# Patient Record
Sex: MALE | Race: WHITE | NOT HISPANIC OR LATINO | Employment: FULL TIME | URBAN - METROPOLITAN AREA
[De-identification: names, ages, dates, MRNs, and addresses within clinical notes are randomized per-mention and may not be internally consistent; named-entity substitution may affect disease eponyms.]

---

## 2017-09-01 ENCOUNTER — OFFICE VISIT (OUTPATIENT)
Dept: PODIATRY | Facility: CLINIC | Age: 52
End: 2017-09-01
Payer: COMMERCIAL

## 2017-09-01 VITALS — WEIGHT: 311.5 LBS | HEIGHT: 69 IN | BODY MASS INDEX: 46.14 KG/M2

## 2017-09-01 DIAGNOSIS — L03.031 PARONYCHIA OF TOE OF RIGHT FOOT: ICD-10-CM

## 2017-09-01 DIAGNOSIS — M79.674 TOE PAIN, RIGHT: Primary | ICD-10-CM

## 2017-09-01 DIAGNOSIS — L60.0 INGROWN NAIL: ICD-10-CM

## 2017-09-01 PROCEDURE — 99204 OFFICE O/P NEW MOD 45 MIN: CPT | Mod: 25,S$GLB,, | Performed by: PODIATRIST

## 2017-09-01 PROCEDURE — 87186 SC STD MICRODIL/AGAR DIL: CPT

## 2017-09-01 PROCEDURE — 99999 PR PBB SHADOW E&M-NEW PATIENT-LVL III: CPT | Mod: PBBFAC,,, | Performed by: PODIATRIST

## 2017-09-01 PROCEDURE — 3008F BODY MASS INDEX DOCD: CPT | Mod: S$GLB,,, | Performed by: PODIATRIST

## 2017-09-01 PROCEDURE — 87077 CULTURE AEROBIC IDENTIFY: CPT

## 2017-09-01 PROCEDURE — 11730 AVULSION NAIL PLATE SIMPLE 1: CPT | Mod: T5,S$GLB,, | Performed by: PODIATRIST

## 2017-09-01 PROCEDURE — 87070 CULTURE OTHR SPECIMN AEROBIC: CPT

## 2017-09-01 PROCEDURE — 87075 CULTR BACTERIA EXCEPT BLOOD: CPT

## 2017-09-01 RX ORDER — CLINDAMYCIN HYDROCHLORIDE 300 MG/1
300 CAPSULE ORAL EVERY 8 HOURS
Qty: 21 CAPSULE | Refills: 0 | Status: SHIPPED | OUTPATIENT
Start: 2017-09-01 | End: 2018-12-03

## 2017-09-01 NOTE — PATIENT INSTRUCTIONS
"POST NAIL PROCEDURE INSTRUCTIONS    1. Leave bandage intact for 12-24 hours. If the bandage sticks as you try to remove it, soak it in warm water until it lifts off.    2. Wash the toe with antibacterial soap and water separate from the body.    3. Dry foot completely after soaking, and apply a small amount of triple antibiotic ointment (neosporin works fine!) and a fabric or cloth bandaid ("plastic" bandaids tend to lift off with ointment use).  Wear open-toed shoes as needed for comfort.     4. Take Advil or Tylenol as needed for pain.     5. Your toe may drain for the next few days. Normal drainage is yellow-to-pink, and clear, much like the fluid in a blister. Watch for redness spreading up your toe into your foot, white thick drainage (pus), pain unrelieved by medication, or nausea/vomiting/fever/chills. These are signs of infection. Please call the clinic or visit your doctor.      "

## 2017-09-01 NOTE — PROGRESS NOTES
Subjective:      Patient ID: Carroll Roe is a 52 y.o. male.    Chief Complaint: Ingrown Toenail (Right big toe)  Patient presents to clinic with the chief complaint of Rt. Great toe pain for the past several weeks.  Relates having an ingrown toenail that he has been unable to treat on his own.  States that the nail is tender with wearing closed toe shoes.  Symptoms are alleviated with shoe removal.  Describes pain as throbbing and rates as an 8/10.  Relates having mild drainage from the toe.  Denies having N/V/F/C/D.  Denies any additional pedal complaints.       Past Medical History:   Diagnosis Date    Chicken pox     Hepatomegaly     Snoring        Past Surgical History:   Procedure Laterality Date    SHOULDER SURGERY      right    WRIST SURGERY      left x 3       Family History   Problem Relation Age of Onset    Cataracts Mother     Hyperlipidemia Mother     No Known Problems Father        Social History     Social History    Marital status:      Spouse name: N/A    Number of children: N/A    Years of education: N/A     Social History Main Topics    Smoking status: Former Smoker     Types: Cigarettes    Smokeless tobacco: Former User    Alcohol use Yes      Comment: rare occasions    Drug use: Unknown    Sexual activity: Not Asked     Other Topics Concern    None     Social History Narrative    None       Current Outpatient Prescriptions   Medication Sig Dispense Refill    allopurinol (ZYLOPRIM) 300 MG tablet Take 1 tablet (300 mg total) by mouth once daily. 90 tablet 2    atorvastatin (LIPITOR) 40 MG tablet ATORVASTATIN CALCIUM 40 MG TABS      carvedilol (COREG) 12.5 MG tablet Take 12.5 mg by mouth 2 (two) times daily.       hydrochlorothiazide (HYDRODIURIL) 25 MG tablet TAKE 1 TABLET EVERY MORNING 90 tablet 2    hydrochlorothiazide (HYDRODIURIL) 25 MG tablet HYDROCHLOROTHIAZIDE 25 MG TABS      ramipril (ALTACE) 5 MG capsule RAMIPRIL 5 MG CAPS      clindamycin (CLEOCIN) 300  MG capsule Take 1 capsule (300 mg total) by mouth every 8 (eight) hours. 21 capsule 0     No current facility-administered medications for this visit.        Review of patient's allergies indicates:   Allergen Reactions    Hydromorphone      Other reaction(s): nausea    Rosuvastatin      Other reaction(s): insomnia, insomniaPT TOOK LIPITOR IN PAST AND STATES CAN TAKE.         Review of Systems   Constitution: Negative for chills and fever.   Cardiovascular: Negative for claudication and leg swelling.   Skin: Positive for color change and nail changes.   Musculoskeletal: Negative for joint pain and joint swelling.   Neurological: Negative for numbness and paresthesias.   Psychiatric/Behavioral: Negative for altered mental status.           Objective:      Physical Exam   Constitutional: He is oriented to person, place, and time. He appears well-developed and well-nourished. No distress.   Cardiovascular:   Pulses:       Dorsalis pedis pulses are 2+ on the right side, and 2+ on the left side.        Posterior tibial pulses are 2+ on the right side, and 2+ on the left side.   CFT <3 seconds bilateral.  Pedal hair growth present bilateral.   No varicosities noted bilateral.  No bilateral lower extremity edema.   Musculoskeletal: He exhibits edema and tenderness.   Muscle strength 5/5 in all muscle groups bilateral.  No tenderness nor crepitation with ROM of foot/ankle joints bilateral.  Pain with palpation of the medial nail fold of the Rt. Hallux.  Bilateral rectus foot type.     Neurological: He is alert and oriented to person, place, and time. He has normal strength. No sensory deficit.   Light touch intact bilateral.     Skin: Skin is warm and dry. Capillary refill takes less than 2 seconds. No abrasion, no bruising, no burn, no ecchymosis, no laceration, no lesion, no petechiae and no rash noted. He is not diaphoretic. There is erythema. No pallor.   Pedal skin has normal turgor, temperature, and texture  bilateral.  Incurvation noted to the medial border of the Rt. Hallux nail plate.  Localized edema and erythema noted to the adjacent nail fold.  Remaining toenails x 9 appear normotrophic. Examination of the skin reveals no evidence of significant maceration, rashes, open lesions, suspicious appearing nevi or other concerning lesions.              Assessment:       Encounter Diagnoses   Name Primary?    Toe pain, right Yes    Ingrown nail     Paronychia of toe of right foot          Plan:       Carroll was seen today for ingrown toenail.    Diagnoses and all orders for this visit:    Toe pain, right    Ingrown nail  -     Aerobic culture (Specify Source)  -     CULTURE, ANAEROBE  -     clindamycin (CLEOCIN) 300 MG capsule; Take 1 capsule (300 mg total) by mouth every 8 (eight) hours.    Paronychia of toe of right foot  -     Aerobic culture (Specify Source)  -     CULTURE, ANAEROBE  -     clindamycin (CLEOCIN) 300 MG capsule; Take 1 capsule (300 mg total) by mouth every 8 (eight) hours.      I counseled the patient on his conditions, their implications and medical management.      Discussed, in depth, the risks, benefits, procedure, and follow up care associated with a partial avulsion of the medial border of the Rt. Great toenail. All questions were thoroughly answered. Consent was read, signed, and witnessed. See attached procedure note.      Wound cultures obtained.    Prescription written for clindamycin.    Given verbal and written wound care/dressing change instructions.      Rest, ice, and elevate the surgical extremity.       Fitted and dispensed a postoperative shoe to facilitate healing and to offload the surgical site.      Instructed to take tylenol or ibuprofen for postoperative pain.    RTC in 1 week for follow up.    Yaw Remy DPM

## 2017-09-02 NOTE — PROCEDURES
Nail Removal  Date/Time: 9/1/2017 6:09 PM  Performed by: SADIQ ZUNIGA  Authorized by: SADIQ ZUNIGA       Location:  Right foot  Location detail:  Right big toe  Anesthesia:  Digital block  Local anesthetic: lidocaine 1% without epinephrine  Anesthetic total (ml):  6  Preparation:  Skin prepped with alcohol and skin prepped with Betadine    Amount removed:  Partial  Side:  Ulnar  Wedge excision of skin of nail fold: No    Nail bed sutured?: No    Nail matrix removed:  None  Removed nail replaced and anchored: No    Dressing applied:  4x4, antibiotic ointment and dressing applied  Patient tolerance:  Patient tolerated the procedure well with no immediate complications

## 2017-09-05 LAB — BACTERIA SPEC AEROBE CULT: NORMAL

## 2017-09-07 ENCOUNTER — OFFICE VISIT (OUTPATIENT)
Dept: PODIATRY | Facility: CLINIC | Age: 52
End: 2017-09-07
Payer: COMMERCIAL

## 2017-09-07 VITALS — HEIGHT: 69 IN | WEIGHT: 311.5 LBS | BODY MASS INDEX: 46.14 KG/M2

## 2017-09-07 DIAGNOSIS — Z98.890 STATUS POST NAIL SURGERY: Primary | ICD-10-CM

## 2017-09-07 LAB — BACTERIA SPEC ANAEROBE CULT: NORMAL

## 2017-09-07 PROCEDURE — 99999 PR PBB SHADOW E&M-EST. PATIENT-LVL II: CPT | Mod: PBBFAC,,, | Performed by: PODIATRIST

## 2017-09-07 PROCEDURE — 99024 POSTOP FOLLOW-UP VISIT: CPT | Mod: S$GLB,,, | Performed by: PODIATRIST

## 2017-09-08 NOTE — PROGRESS NOTES
Subjective:      Patient ID: Carroll Roe is a 52 y.o. male.    Chief Complaint: Toe Pain (rt great toe )  Patient presents to clinic 1 week S/P partial avulsion of the medial border of the Rt. Hallux toenail.  Denies pain to the affected digit with today's exam.  Relates keeping the site covered with neosporin and a bandage daily.  Is able to ambulate pain free in casual shoe gear.  Notes having an adverse reaction with taking Clindamycin.  States that he began having spells of dizziness and malaise over the weekend.  States that symptoms were quite pronounced on Monday prompting him to discontinue taking the antibiotic.  States that symptoms resolved thereafter.  Denies having N/V/F/C/D.  Denies any additional pedal complaints.       Past Medical History:   Diagnosis Date    Chicken pox     Hepatomegaly     Snoring        Past Surgical History:   Procedure Laterality Date    SHOULDER SURGERY      right    WRIST SURGERY      left x 3       Family History   Problem Relation Age of Onset    Cataracts Mother     Hyperlipidemia Mother     No Known Problems Father        Social History     Social History    Marital status:      Spouse name: N/A    Number of children: N/A    Years of education: N/A     Social History Main Topics    Smoking status: Former Smoker     Types: Cigarettes    Smokeless tobacco: Former User    Alcohol use Yes      Comment: rare occasions    Drug use: Unknown    Sexual activity: Not Asked     Other Topics Concern    None     Social History Narrative    None       Current Outpatient Prescriptions   Medication Sig Dispense Refill    allopurinol (ZYLOPRIM) 300 MG tablet Take 1 tablet (300 mg total) by mouth once daily. 90 tablet 2    atorvastatin (LIPITOR) 40 MG tablet ATORVASTATIN CALCIUM 40 MG TABS      carvedilol (COREG) 12.5 MG tablet Take 12.5 mg by mouth 2 (two) times daily.       clindamycin (CLEOCIN) 300 MG capsule Take 1 capsule (300 mg total) by mouth every  8 (eight) hours. 21 capsule 0    hydrochlorothiazide (HYDRODIURIL) 25 MG tablet TAKE 1 TABLET EVERY MORNING 90 tablet 2    hydrochlorothiazide (HYDRODIURIL) 25 MG tablet HYDROCHLOROTHIAZIDE 25 MG TABS      ramipril (ALTACE) 5 MG capsule RAMIPRIL 5 MG CAPS       No current facility-administered medications for this visit.        Review of patient's allergies indicates:   Allergen Reactions    Hydromorphone      Other reaction(s): nausea    Rosuvastatin      Other reaction(s): insomnia, insomniaPT TOOK LIPITOR IN PAST AND STATES CAN TAKE.         Review of Systems   Constitution: Negative for chills and fever.   Cardiovascular: Negative for claudication and leg swelling.   Skin: Negative for color change and nail changes.   Musculoskeletal: Negative for joint pain and joint swelling.   Neurological: Negative for numbness and paresthesias.   Psychiatric/Behavioral: Negative for altered mental status.           Objective:      Physical Exam   Constitutional: He is oriented to person, place, and time. He appears well-developed and well-nourished. No distress.   Cardiovascular:   Pulses:       Dorsalis pedis pulses are 2+ on the right side, and 2+ on the left side.        Posterior tibial pulses are 2+ on the right side, and 2+ on the left side.   CFT <3 seconds bilateral.  Pedal hair growth present bilateral.   No varicosities noted bilateral.  No bilateral lower extremity edema.   Musculoskeletal: He exhibits no edema or tenderness.   Muscle strength 5/5 in all muscle groups bilateral.  No tenderness nor crepitation with ROM of foot/ankle joints bilateral.  No pain with palpation of the medial nail fold of the Rt. Hallux.  Bilateral rectus foot type.     Neurological: He is alert and oriented to person, place, and time. He has normal strength. No sensory deficit.   Light touch intact bilateral.     Skin: Skin is warm and dry. Capillary refill takes less than 2 seconds. No abrasion, no bruising, no burn, no  ecchymosis, no laceration, no lesion, no petechiae and no rash noted. He is not diaphoretic. No erythema. No pallor.   Pedal skin has normal turgor, temperature, and texture bilateral.  Straight edge noted to the medial border of the Rt. Hallux nail plate.  Resolution of previous edema and erythema of the adjacent nail fold.  Complete epithelialization of the exposed medial nail fold.  Remaining toenails x 9 appear normotrophic. Examination of the skin reveals no evidence of significant maceration, rashes, open lesions, suspicious appearing nevi or other concerning lesions.              Assessment:       Encounter Diagnosis   Name Primary?    Status post nail surgery Yes         Plan:       Carroll was seen today for toe pain.    Diagnoses and all orders for this visit:    Status post nail surgery      I counseled the patient on his conditions, their implications and medical management.      Overall, satisfactory postoperative course noted.    Advised to discontinue application of neosporin and a bandage, as the toe is completely healed on exam.    May resume normal bathing routine.      May resume normal physical activity and is able to wear casual shoe gear to tolerance.    Updated patient's allergy list, as he had an adverse reaction to Clindamycin.    RTC prn.    Yaw Remy DPM

## 2019-02-22 ENCOUNTER — OFFICE VISIT (OUTPATIENT)
Dept: OPHTHALMOLOGY | Facility: CLINIC | Age: 54
End: 2019-02-22
Payer: COMMERCIAL

## 2019-02-22 DIAGNOSIS — H43.11 VITREOUS HEMORRHAGE OF RIGHT EYE: Primary | ICD-10-CM

## 2019-02-22 DIAGNOSIS — Z98.890 HX OF LASIK: ICD-10-CM

## 2019-02-22 PROCEDURE — 76512 B-SCAN ULTRASOUND - OD - RIGHT EYE: ICD-10-PCS | Mod: RT,S$GLB,, | Performed by: OPHTHALMOLOGY

## 2019-02-22 PROCEDURE — 99999 PR PBB SHADOW E&M-EST. PATIENT-LVL III: CPT | Mod: PBBFAC,,, | Performed by: OPHTHALMOLOGY

## 2019-02-22 PROCEDURE — 92004 COMPRE OPH EXAM NEW PT 1/>: CPT | Mod: S$GLB,,, | Performed by: OPHTHALMOLOGY

## 2019-02-22 PROCEDURE — 76512 OPH US DX B-SCAN: CPT | Mod: RT,S$GLB,, | Performed by: OPHTHALMOLOGY

## 2019-02-22 PROCEDURE — 99999 PR PBB SHADOW E&M-EST. PATIENT-LVL III: ICD-10-PCS | Mod: PBBFAC,,, | Performed by: OPHTHALMOLOGY

## 2019-02-22 PROCEDURE — 92004 PR EYE EXAM, NEW PATIENT,COMPREHESV: ICD-10-PCS | Mod: S$GLB,,, | Performed by: OPHTHALMOLOGY

## 2019-02-22 NOTE — PROGRESS NOTES
HPI     [place] [place] 8am a black line then it   swirled like a musical note states now it looks like he is looking through   a lava lamp no flashes. He thinks he saw some flashes and floaters a few   days ago. When he moves his head from side to side he states its like   color moving on water. Denies eye pain or head trauma. States grand mother   with glaucoma. Using Zaditor OU PRN     S/P LASIK 10 years ago.    Agree with above. Saw a black line off to right side along with a flash   the other day. This am it flipped over and started swirling, then he   couldn't see.     Last edited by Vee Pineda MD on 2/22/2019  2:10 PM.   (History)        ROS     Positive for: Cardiovascular (HTN - controlled with meds per pt), Eyes   (LASIK OU)    Negative for: Endocrine (denies DM), Heme/Lymph (denies ASA)    Last edited by Vee Pineda MD on 2/22/2019  2:00 PM.   (History)        Assessment /Plan     For exam results, see Encounter Report.    Vitreous hemorrhage of right eye  -     B-Scan Ultrasound - OD - Right Eye; Future      Difficult view, I was unable to identify a tear or detachment. Not on blood thinners. Discussed with retina fellow. Will have him maintain elevated HOB, f/u with retina Monday am. If worsens before then, pt instructed to go straight to [facility] ED.

## 2019-02-25 ENCOUNTER — INITIAL CONSULT (OUTPATIENT)
Dept: OPHTHALMOLOGY | Facility: CLINIC | Age: 54
End: 2019-02-25
Payer: COMMERCIAL

## 2019-02-25 VITALS — SYSTOLIC BLOOD PRESSURE: 135 MMHG | DIASTOLIC BLOOD PRESSURE: 84 MMHG | HEART RATE: 64 BPM

## 2019-02-25 DIAGNOSIS — H33.311 RETINAL TEAR, RIGHT: ICD-10-CM

## 2019-02-25 DIAGNOSIS — H43.11 VITREOUS HEMORRHAGE OF RIGHT EYE: Primary | ICD-10-CM

## 2019-02-25 DIAGNOSIS — H25.13 NS (NUCLEAR SCLEROSIS), BILATERAL: ICD-10-CM

## 2019-02-25 PROCEDURE — 92014 PR EYE EXAM, EST PATIENT,COMPREHESV: ICD-10-PCS | Mod: 57,S$GLB,, | Performed by: OPHTHALMOLOGY

## 2019-02-25 PROCEDURE — 67145 PROPH RTA DTCHMNT PC: CPT | Mod: RT,S$GLB,, | Performed by: OPHTHALMOLOGY

## 2019-02-25 PROCEDURE — 99999 PR PBB SHADOW E&M-EST. PATIENT-LVL III: CPT | Mod: PBBFAC,,, | Performed by: OPHTHALMOLOGY

## 2019-02-25 PROCEDURE — 67145 PR PROPHYLAXIS, RETINAL DETACH, PHOTOCOAGULATION: ICD-10-PCS | Mod: RT,S$GLB,, | Performed by: OPHTHALMOLOGY

## 2019-02-25 PROCEDURE — 92014 COMPRE OPH EXAM EST PT 1/>: CPT | Mod: 57,S$GLB,, | Performed by: OPHTHALMOLOGY

## 2019-02-25 PROCEDURE — 99999 PR PBB SHADOW E&M-EST. PATIENT-LVL III: ICD-10-PCS | Mod: PBBFAC,,, | Performed by: OPHTHALMOLOGY

## 2019-02-25 NOTE — PROGRESS NOTES
HPI     Vit heme consult per Dr. Lua   DLS-02/22/2019 Dr. Pineda     [place] [address] [place] 8am a black line then it swirled like a musical & looks like he is looking through a lava lamp no flashes. He thinks he saw some flashes and floaters a few days before. When he moves his head from side to side he states its like color moving on water. Denies eye pain or head trauma.     Black swirl is still in OD today looks like silt in dirty water   Headaches frequently since started     Zaditor OU PRN     S/P LASIK 10 years ago.    Last edited by Paulina Granados on 2/25/2019  8:45 AM. (History)      OCT - OD - poor view  OS - No ME      A/P    1. Hemorrhagic PVD OD  2. Small Flap tear at 1:30 OD    Laser retinopexy OD today    HOB elevated at night    RD precautions    3. Early NS OU      1 month NS      Risks, benefits, and alternatives to treatment discussed in detail with the patient.  The patient voiced understanding and wished to proceed with the procedure    Laser Procedure Note  Dx: Retinal Tear OD  Laser: Retinopexy OD  Argon  Spot: 200  Power: 150-180  Dur: 0.1s  #:   222  Complications: None  F/U as above

## 2019-02-25 NOTE — LETTER
February 25, 2019      Vee Pineda MD  [address] [place] [address]   [address]  [facility] [address]           [facility]  [address]  [place] [address]  Phone: [phone]  Fax: [phone]          Patient: Carroll Roe   MR Number: [id number]   YOB: 1965   Date of Visit: 2/25/2019       Dear Dr. Vee Pineda:    Thank you for referring Carroll Roe to me for evaluation. Attached you will find relevant portions of my assessment and plan of care.    If you have questions, please do not hesitate to call me. I look forward to following Carroll Roe along with you.    Sincerely,    LESLYE Pickard MD    Enclosure  CC:  No Recipients    If you would like to receive this communication electronically, please contact [email or web address] or [phone] to request more information on [employer] Link access.    For providers and/or their staff who would like to refer a patient to [facility], please contact us through our one-stop-shop provider referral line, [facility] [place], at [phone].    If you feel you have received this communication in error or would no longer like to receive these types of communications, please e-mail [email or web address]

## 2019-02-27 ENCOUNTER — CLINICAL SUPPORT (OUTPATIENT)
Dept: OTHER | Facility: CLINIC | Age: 54
End: 2019-02-27
Payer: COMMERCIAL

## 2019-02-27 DIAGNOSIS — Z00.8 ENCOUNTER FOR OTHER GENERAL EXAMINATION: ICD-10-CM

## 2019-02-27 PROCEDURE — 80061 PR  LIPID PANEL: ICD-10-PCS | Mod: QW,S$GLB,, | Performed by: INTERNAL MEDICINE

## 2019-02-27 PROCEDURE — 82947 ASSAY GLUCOSE BLOOD QUANT: CPT | Mod: QW,S$GLB,, | Performed by: INTERNAL MEDICINE

## 2019-02-27 PROCEDURE — 80061 LIPID PANEL: CPT | Mod: QW,S$GLB,, | Performed by: INTERNAL MEDICINE

## 2019-02-27 PROCEDURE — 99401 PR PREVENT COUNSEL,INDIV,15 MIN: ICD-10-PCS | Mod: S$GLB,,, | Performed by: INTERNAL MEDICINE

## 2019-02-27 PROCEDURE — 82947 PR  ASSAY QUANTITATIVE,BLOOD GLUCOSE: ICD-10-PCS | Mod: QW,S$GLB,, | Performed by: INTERNAL MEDICINE

## 2019-02-27 PROCEDURE — 99401 PREV MED CNSL INDIV APPRX 15: CPT | Mod: S$GLB,,, | Performed by: INTERNAL MEDICINE

## 2019-02-28 VITALS — HEIGHT: 69 IN | BODY MASS INDEX: 47.94 KG/M2

## 2019-02-28 LAB
GLUCOSE SERPL-MCNC: 147 MG/DL (ref 70–110)
HDLC SERPL-MCNC: 30 MG/DL
POC CHOLESTEROL, LDL (DOCK): 61 MG/DL
POC CHOLESTEROL, TOTAL: 138 MG/DL
TRIGL SERPL-MCNC: 231 MG/DL

## 2019-03-14 PROBLEM — E66.01 MORBID OBESITY WITH BMI OF 45.0-49.9, ADULT: Status: ACTIVE | Noted: 2019-03-14

## 2019-03-14 PROBLEM — I10 ESSENTIAL HYPERTENSION: Status: ACTIVE | Noted: 2019-03-14

## 2019-03-14 PROBLEM — E78.5 HYPERLIPIDEMIA: Status: ACTIVE | Noted: 2019-03-14

## 2019-03-14 PROBLEM — R07.9 CHEST PAIN: Status: ACTIVE | Noted: 2019-03-14

## 2019-04-01 ENCOUNTER — OFFICE VISIT (OUTPATIENT)
Dept: OPHTHALMOLOGY | Facility: CLINIC | Age: 54
End: 2019-04-01
Payer: COMMERCIAL

## 2019-04-01 DIAGNOSIS — H33.311 RETINAL TEAR, RIGHT: ICD-10-CM

## 2019-04-01 DIAGNOSIS — H43.11 VITREOUS HEMORRHAGE OF RIGHT EYE: Primary | ICD-10-CM

## 2019-04-01 PROCEDURE — 99999 PR PBB SHADOW E&M-EST. PATIENT-LVL III: ICD-10-PCS | Mod: PBBFAC,,, | Performed by: OPHTHALMOLOGY

## 2019-04-01 PROCEDURE — 99024 PR POST-OP FOLLOW-UP VISIT: ICD-10-PCS | Mod: S$GLB,,, | Performed by: OPHTHALMOLOGY

## 2019-04-01 PROCEDURE — 99999 PR PBB SHADOW E&M-EST. PATIENT-LVL III: CPT | Mod: PBBFAC,,, | Performed by: OPHTHALMOLOGY

## 2019-04-01 PROCEDURE — 99024 POSTOP FOLLOW-UP VISIT: CPT | Mod: S$GLB,,, | Performed by: OPHTHALMOLOGY

## 2019-04-01 NOTE — PROGRESS NOTES
HPI     Eye Problem      Additional comments: 5 week check              Comments     DLS 2/25/19- vision OD is back. He is still seeing some flashes and eye is tearing a lot       Prior OCT - OD - poor view  OS - No ME      A/P    1. Hemorrhagic PVD OD  2. Small Flap tear at 1:30 OD  S/p laser retinopexy 2/25/19    Doing well, still some central heme    Good laser, flat      RD precautions    3. Early NS OU      6-7 weeks  NS Dilate OD

## 2019-04-23 ENCOUNTER — TELEPHONE (OUTPATIENT)
Dept: OPHTHALMOLOGY | Facility: CLINIC | Age: 54
End: 2019-04-23

## 2019-04-23 ENCOUNTER — OFFICE VISIT (OUTPATIENT)
Dept: OPHTHALMOLOGY | Facility: CLINIC | Age: 54
End: 2019-04-23
Payer: COMMERCIAL

## 2019-04-23 VITALS — HEART RATE: 59 BPM | SYSTOLIC BLOOD PRESSURE: 116 MMHG | DIASTOLIC BLOOD PRESSURE: 77 MMHG

## 2019-04-23 DIAGNOSIS — H43.11 VITREOUS HEMORRHAGE OF RIGHT EYE: Primary | ICD-10-CM

## 2019-04-23 DIAGNOSIS — H33.011 RETINAL DETACHMENT OF RIGHT EYE WITH SINGLE BREAK: Primary | ICD-10-CM

## 2019-04-23 DIAGNOSIS — H33.011 RETINAL DETACHMENT OF RIGHT EYE WITH SINGLE BREAK: ICD-10-CM

## 2019-04-23 PROCEDURE — 92226 PR SPECIAL EYE EXAM, SUBSEQUENT: ICD-10-PCS | Mod: RT,S$GLB,, | Performed by: OPHTHALMOLOGY

## 2019-04-23 PROCEDURE — 99999 PR PBB SHADOW E&M-EST. PATIENT-LVL III: CPT | Mod: PBBFAC,,, | Performed by: OPHTHALMOLOGY

## 2019-04-23 PROCEDURE — 92014 COMPRE OPH EXAM EST PT 1/>: CPT | Mod: S$GLB,,, | Performed by: OPHTHALMOLOGY

## 2019-04-23 PROCEDURE — 92014 PR EYE EXAM, EST PATIENT,COMPREHESV: ICD-10-PCS | Mod: S$GLB,,, | Performed by: OPHTHALMOLOGY

## 2019-04-23 PROCEDURE — 99999 PR PBB SHADOW E&M-EST. PATIENT-LVL III: ICD-10-PCS | Mod: PBBFAC,,, | Performed by: OPHTHALMOLOGY

## 2019-04-23 PROCEDURE — 92226 PR SPECIAL EYE EXAM, SUBSEQUENT: CPT | Mod: RT,S$GLB,, | Performed by: OPHTHALMOLOGY

## 2019-04-23 RX ORDER — ALLOPURINOL 300 MG/1
300 TABLET ORAL DAILY
COMMUNITY
End: 2021-04-22 | Stop reason: SDUPTHER

## 2019-04-23 NOTE — PROGRESS NOTES
HPI     DLS 04/01/19 by Dr. ALBAN Pickard MD    55 YO M here today with c/o seeing a black mass in the right eye temporal side x 1 wk and now it's a grey filter that has a rainbow colors and decrease in vision. The images appears as though something is melting. stj   sd    +blurred vision  +light flashes/floaters  -headaches      HPI     Eye Problem      Additional comments: 5 week check              Comments     DLS 2/25/19- vision OD is back. He is still seeing some flashes and eye is tearing a lot       Prior OCT - OD - poor view  OS - No ME      A/P    1. Hemorrhagic PVD OD  2. Small Flap tear at 1:30 OD  S/p laser retinopexy 2/25/19    3. RRD OD  Macula involving x 4-5 days    Plan 25g PPV/EL/AFx/SF6 OD for RRD OD    Local MAC  LOC 35 min    Risks, benefits, and alternatives to treatment discussed in detail with the patient.  The patient voiced understanding and wished to proceed with the procedure      4.  Early NS OU        To OR

## 2019-04-24 ENCOUNTER — HOSPITAL ENCOUNTER (OUTPATIENT)
Facility: HOSPITAL | Age: 54
Discharge: HOME | End: 2019-04-24
Attending: OPHTHALMOLOGY | Admitting: OPHTHALMOLOGY
Payer: COMMERCIAL

## 2019-04-24 ENCOUNTER — ANESTHESIA (OUTPATIENT)
Dept: SURGERY | Facility: HOSPITAL | Age: 54
End: 2019-04-24
Payer: COMMERCIAL

## 2019-04-24 ENCOUNTER — ANESTHESIA EVENT (OUTPATIENT)
Dept: SURGERY | Facility: HOSPITAL | Age: 54
End: 2019-04-24
Payer: COMMERCIAL

## 2019-04-24 VITALS
HEART RATE: 63 BPM | HEIGHT: 69 IN | SYSTOLIC BLOOD PRESSURE: 143 MMHG | WEIGHT: 310 LBS | TEMPERATURE: 98 F | DIASTOLIC BLOOD PRESSURE: 67 MMHG | BODY MASS INDEX: 45.91 KG/M2 | RESPIRATION RATE: 20 BRPM | OXYGEN SATURATION: 97 %

## 2019-04-24 DIAGNOSIS — H33.311 RETINAL TEAR, RIGHT: ICD-10-CM

## 2019-04-24 DIAGNOSIS — H33.001 MACULA-OFF RHEGMATOGENOUS RETINAL DETACHMENT, RIGHT: Primary | ICD-10-CM

## 2019-04-24 LAB — POCT GLUCOSE: 136 MG/DL (ref 70–110)

## 2019-04-24 PROCEDURE — 82962 GLUCOSE BLOOD TEST: CPT | Performed by: OPHTHALMOLOGY

## 2019-04-24 PROCEDURE — D9220A PRA ANESTHESIA: Mod: CRNA,,, | Performed by: NURSE ANESTHETIST, CERTIFIED REGISTERED

## 2019-04-24 PROCEDURE — 63600175 PHARM REV CODE 636 W HCPCS: Performed by: NURSE ANESTHETIST, CERTIFIED REGISTERED

## 2019-04-24 PROCEDURE — S0020 INJECTION, BUPIVICAINE HYDRO: HCPCS | Performed by: OPHTHALMOLOGY

## 2019-04-24 PROCEDURE — 36000707: Performed by: OPHTHALMOLOGY

## 2019-04-24 PROCEDURE — 71000015 HC POSTOP RECOV 1ST HR: Performed by: OPHTHALMOLOGY

## 2019-04-24 PROCEDURE — 37000008 HC ANESTHESIA 1ST 15 MINUTES: Performed by: OPHTHALMOLOGY

## 2019-04-24 PROCEDURE — 36000706: Performed by: OPHTHALMOLOGY

## 2019-04-24 PROCEDURE — 25000003 PHARM REV CODE 250: Performed by: OPHTHALMOLOGY

## 2019-04-24 PROCEDURE — 63600175 PHARM REV CODE 636 W HCPCS: Performed by: OPHTHALMOLOGY

## 2019-04-24 PROCEDURE — D9220A PRA ANESTHESIA: ICD-10-PCS | Mod: ANES,,, | Performed by: ANESTHESIOLOGY

## 2019-04-24 PROCEDURE — 37000009 HC ANESTHESIA EA ADD 15 MINS: Performed by: OPHTHALMOLOGY

## 2019-04-24 PROCEDURE — 67108 REPAIR DETACHED RETINA: CPT | Mod: 79,RT,, | Performed by: OPHTHALMOLOGY

## 2019-04-24 PROCEDURE — 71000044 HC DOSC ROUTINE RECOVERY FIRST HOUR: Performed by: OPHTHALMOLOGY

## 2019-04-24 PROCEDURE — 67108 PR REPAIR DETACH RETINA,W VITRECTOMY: ICD-10-PCS | Mod: 79,RT,, | Performed by: OPHTHALMOLOGY

## 2019-04-24 PROCEDURE — D9220A PRA ANESTHESIA: Mod: ANES,,, | Performed by: ANESTHESIOLOGY

## 2019-04-24 PROCEDURE — D9220A PRA ANESTHESIA: ICD-10-PCS | Mod: CRNA,,, | Performed by: NURSE ANESTHETIST, CERTIFIED REGISTERED

## 2019-04-24 RX ORDER — ONDANSETRON 8 MG/1
8 TABLET, ORALLY DISINTEGRATING ORAL EVERY 8 HOURS PRN
Status: DISCONTINUED | OUTPATIENT
Start: 2019-04-24 | End: 2019-04-24 | Stop reason: HOSPADM

## 2019-04-24 RX ORDER — EPINEPHRINE CONVENIENCE KIT 1 MG/ML(1)
KIT INTRAMUSCULAR; SUBCUTANEOUS
Status: DISCONTINUED | OUTPATIENT
Start: 2019-04-24 | End: 2019-04-24 | Stop reason: HOSPADM

## 2019-04-24 RX ORDER — TETRACAINE HYDROCHLORIDE 5 MG/ML
1 SOLUTION OPHTHALMIC
Status: DISCONTINUED | OUTPATIENT
Start: 2019-04-24 | End: 2019-04-24 | Stop reason: HOSPADM

## 2019-04-24 RX ORDER — PHENYLEPHRINE HYDROCHLORIDE 25 MG/ML
1 SOLUTION/ DROPS OPHTHALMIC
Status: DISCONTINUED | OUTPATIENT
Start: 2019-04-24 | End: 2019-04-24 | Stop reason: HOSPADM

## 2019-04-24 RX ORDER — BUPIVACAINE HYDROCHLORIDE 7.5 MG/ML
INJECTION, SOLUTION EPIDURAL; RETROBULBAR
Status: DISCONTINUED | OUTPATIENT
Start: 2019-04-24 | End: 2019-04-24 | Stop reason: HOSPADM

## 2019-04-24 RX ORDER — OXYCODONE AND ACETAMINOPHEN 5; 325 MG/1; MG/1
1 TABLET ORAL EVERY 6 HOURS PRN
Qty: 12 TABLET | Refills: 0 | Status: SHIPPED | OUTPATIENT
Start: 2019-04-24 | End: 2019-08-12

## 2019-04-24 RX ORDER — ACETAMINOPHEN 325 MG/1
650 TABLET ORAL EVERY 4 HOURS PRN
Status: DISCONTINUED | OUTPATIENT
Start: 2019-04-24 | End: 2019-04-24 | Stop reason: HOSPADM

## 2019-04-24 RX ORDER — LIDOCAINE HCL/PF 100 MG/5ML
SYRINGE (ML) INTRAVENOUS
Status: DISCONTINUED | OUTPATIENT
Start: 2019-04-24 | End: 2019-04-24

## 2019-04-24 RX ORDER — HYDROCODONE BITARTRATE AND ACETAMINOPHEN 5; 325 MG/1; MG/1
1 TABLET ORAL EVERY 4 HOURS PRN
Status: DISCONTINUED | OUTPATIENT
Start: 2019-04-24 | End: 2019-04-24 | Stop reason: HOSPADM

## 2019-04-24 RX ORDER — MIDAZOLAM HYDROCHLORIDE 1 MG/ML
INJECTION, SOLUTION INTRAMUSCULAR; INTRAVENOUS
Status: DISCONTINUED | OUTPATIENT
Start: 2019-04-24 | End: 2019-04-24

## 2019-04-24 RX ORDER — MOXIFLOXACIN 5 MG/ML
1 SOLUTION/ DROPS OPHTHALMIC
Status: DISCONTINUED | OUTPATIENT
Start: 2019-04-24 | End: 2019-04-24 | Stop reason: HOSPADM

## 2019-04-24 RX ORDER — NEOMYCIN SULFATE, POLYMYXIN B SULFATE, AND DEXAMETHASONE 3.5; 10000; 1 MG/G; [USP'U]/G; MG/G
OINTMENT OPHTHALMIC
Status: DISCONTINUED
Start: 2019-04-24 | End: 2019-04-24 | Stop reason: HOSPADM

## 2019-04-24 RX ORDER — PROPOFOL 10 MG/ML
VIAL (ML) INTRAVENOUS
Status: DISCONTINUED | OUTPATIENT
Start: 2019-04-24 | End: 2019-04-24

## 2019-04-24 RX ORDER — VANCOMYCIN HYDROCHLORIDE 500 MG/10ML
INJECTION, POWDER, LYOPHILIZED, FOR SOLUTION INTRAVENOUS
Status: DISCONTINUED | OUTPATIENT
Start: 2019-04-24 | End: 2019-04-24 | Stop reason: HOSPADM

## 2019-04-24 RX ORDER — NEOMYCIN SULFATE, POLYMYXIN B SULFATE, AND DEXAMETHASONE 3.5; 10000; 1 MG/G; [USP'U]/G; MG/G
OINTMENT OPHTHALMIC
Status: DISCONTINUED | OUTPATIENT
Start: 2019-04-24 | End: 2019-04-24 | Stop reason: HOSPADM

## 2019-04-24 RX ORDER — CYCLOPENTOLATE HYDROCHLORIDE 10 MG/ML
1 SOLUTION/ DROPS OPHTHALMIC
Status: DISCONTINUED | OUTPATIENT
Start: 2019-04-24 | End: 2019-04-24 | Stop reason: HOSPADM

## 2019-04-24 RX ORDER — LIDOCAINE HYDROCHLORIDE 10 MG/ML
1 INJECTION, SOLUTION EPIDURAL; INFILTRATION; INTRACAUDAL; PERINEURAL ONCE
Status: COMPLETED | OUTPATIENT
Start: 2019-04-24 | End: 2019-04-24

## 2019-04-24 RX ORDER — DEXAMETHASONE SODIUM PHOSPHATE 4 MG/ML
INJECTION, SOLUTION INTRA-ARTICULAR; INTRALESIONAL; INTRAMUSCULAR; INTRAVENOUS; SOFT TISSUE
Status: DISCONTINUED | OUTPATIENT
Start: 2019-04-24 | End: 2019-04-24 | Stop reason: HOSPADM

## 2019-04-24 RX ORDER — BUPIVACAINE HYDROCHLORIDE 7.5 MG/ML
INJECTION, SOLUTION EPIDURAL; RETROBULBAR
Status: DISCONTINUED
Start: 2019-04-24 | End: 2019-04-24 | Stop reason: HOSPADM

## 2019-04-24 RX ORDER — PREDNISOLONE ACETATE 10 MG/ML
1 SUSPENSION/ DROPS OPHTHALMIC
Status: DISCONTINUED | OUTPATIENT
Start: 2019-04-24 | End: 2019-04-24 | Stop reason: HOSPADM

## 2019-04-24 RX ORDER — ONDANSETRON 4 MG/1
4 TABLET, FILM COATED ORAL EVERY 8 HOURS PRN
Qty: 12 TABLET | Refills: 0 | Status: SHIPPED | OUTPATIENT
Start: 2019-04-24 | End: 2019-08-12

## 2019-04-24 RX ORDER — LIDOCAINE HYDROCHLORIDE 20 MG/ML
INJECTION, SOLUTION EPIDURAL; INFILTRATION; INTRACAUDAL; PERINEURAL
Status: DISCONTINUED
Start: 2019-04-24 | End: 2019-04-24 | Stop reason: HOSPADM

## 2019-04-24 RX ORDER — VANCOMYCIN HYDROCHLORIDE 500 MG/10ML
INJECTION, POWDER, LYOPHILIZED, FOR SOLUTION INTRAVENOUS
Status: DISCONTINUED
Start: 2019-04-24 | End: 2019-04-24 | Stop reason: HOSPADM

## 2019-04-24 RX ORDER — HYDROCODONE BITARTRATE AND ACETAMINOPHEN 5; 325 MG/1; MG/1
TABLET ORAL
Status: DISCONTINUED
Start: 2019-04-24 | End: 2019-04-24 | Stop reason: HOSPADM

## 2019-04-24 RX ORDER — TROPICAMIDE 10 MG/ML
1 SOLUTION/ DROPS OPHTHALMIC
Status: DISCONTINUED | OUTPATIENT
Start: 2019-04-24 | End: 2019-04-24 | Stop reason: HOSPADM

## 2019-04-24 RX ORDER — DEXAMETHASONE SODIUM PHOSPHATE 4 MG/ML
INJECTION, SOLUTION INTRA-ARTICULAR; INTRALESIONAL; INTRAMUSCULAR; INTRAVENOUS; SOFT TISSUE
Status: DISCONTINUED
Start: 2019-04-24 | End: 2019-04-24 | Stop reason: HOSPADM

## 2019-04-24 RX ORDER — FENTANYL CITRATE 50 UG/ML
INJECTION, SOLUTION INTRAMUSCULAR; INTRAVENOUS
Status: DISCONTINUED | OUTPATIENT
Start: 2019-04-24 | End: 2019-04-24

## 2019-04-24 RX ORDER — SODIUM CHLORIDE 0.9 % (FLUSH) 0.9 %
10 SYRINGE (ML) INJECTION
Status: DISCONTINUED | OUTPATIENT
Start: 2019-04-24 | End: 2019-04-24 | Stop reason: HOSPADM

## 2019-04-24 RX ORDER — EPINEPHRINE 1 MG/ML
INJECTION, SOLUTION INTRACARDIAC; INTRAMUSCULAR; INTRAVENOUS; SUBCUTANEOUS
Status: DISCONTINUED
Start: 2019-04-24 | End: 2019-04-24 | Stop reason: HOSPADM

## 2019-04-24 RX ORDER — SODIUM CHLORIDE 9 MG/ML
INJECTION, SOLUTION INTRAVENOUS CONTINUOUS
Status: DISCONTINUED | OUTPATIENT
Start: 2019-04-24 | End: 2019-04-24 | Stop reason: HOSPADM

## 2019-04-24 RX ORDER — LIDOCAINE HYDROCHLORIDE 20 MG/ML
INJECTION, SOLUTION EPIDURAL; INFILTRATION; INTRACAUDAL; PERINEURAL
Status: DISCONTINUED | OUTPATIENT
Start: 2019-04-24 | End: 2019-04-24 | Stop reason: HOSPADM

## 2019-04-24 RX ADMIN — PREDNISOLONE ACETATE 1 DROP: 10 SUSPENSION/ DROPS OPHTHALMIC at 09:04

## 2019-04-24 RX ADMIN — MOXIFLOXACIN 1 DROP: 5 SOLUTION/ DROPS OPHTHALMIC at 08:04

## 2019-04-24 RX ADMIN — TROPICAMIDE 1 DROP: 10 SOLUTION/ DROPS OPHTHALMIC at 08:04

## 2019-04-24 RX ADMIN — CYCLOPENTOLATE HYDROCHLORIDE 1 DROP: 10 SOLUTION/ DROPS OPHTHALMIC at 09:04

## 2019-04-24 RX ADMIN — TROPICAMIDE 1 DROP: 10 SOLUTION/ DROPS OPHTHALMIC at 09:04

## 2019-04-24 RX ADMIN — PREDNISOLONE ACETATE 1 DROP: 10 SUSPENSION/ DROPS OPHTHALMIC at 08:04

## 2019-04-24 RX ADMIN — HOMATROPINE HYDROBROMIDE 1 DROP: 50 SOLUTION OPHTHALMIC at 08:04

## 2019-04-24 RX ADMIN — PHENYLEPHRINE HYDROCHLORIDE 1 DROP: 25 SOLUTION/ DROPS OPHTHALMIC at 09:04

## 2019-04-24 RX ADMIN — PROPOFOL 60 MG: 10 INJECTION, EMULSION INTRAVENOUS at 10:04

## 2019-04-24 RX ADMIN — CYCLOPENTOLATE HYDROCHLORIDE 1 DROP: 10 SOLUTION/ DROPS OPHTHALMIC at 08:04

## 2019-04-24 RX ADMIN — TETRACAINE HYDROCHLORIDE 1 DROP: 5 SOLUTION OPHTHALMIC at 08:04

## 2019-04-24 RX ADMIN — HOMATROPINE HYDROBROMIDE 1 DROP: 50 SOLUTION OPHTHALMIC at 09:04

## 2019-04-24 RX ADMIN — PHENYLEPHRINE HYDROCHLORIDE 1 DROP: 25 SOLUTION/ DROPS OPHTHALMIC at 08:04

## 2019-04-24 RX ADMIN — LIDOCAINE HYDROCHLORIDE 50 MG: 20 INJECTION, SOLUTION INTRAVENOUS at 10:04

## 2019-04-24 RX ADMIN — MOXIFLOXACIN 1 DROP: 5 SOLUTION/ DROPS OPHTHALMIC at 09:04

## 2019-04-24 RX ADMIN — MIDAZOLAM HYDROCHLORIDE 1 MG: 1 INJECTION, SOLUTION INTRAMUSCULAR; INTRAVENOUS at 10:04

## 2019-04-24 RX ADMIN — FENTANYL CITRATE 50 MCG: 50 INJECTION, SOLUTION INTRAMUSCULAR; INTRAVENOUS at 10:04

## 2019-04-24 RX ADMIN — LIDOCAINE HYDROCHLORIDE 10 MG: 10 INJECTION, SOLUTION EPIDURAL; INFILTRATION; INTRACAUDAL; PERINEURAL at 09:04

## 2019-04-24 RX ADMIN — SODIUM CHLORIDE: 0.9 INJECTION, SOLUTION INTRAVENOUS at 09:04

## 2019-04-24 RX ADMIN — HYDROCODONE BITARTRATE AND ACETAMINOPHEN 1 TABLET: 5; 325 TABLET ORAL at 11:04

## 2019-04-24 NOTE — ANESTHESIA POSTPROCEDURE EVALUATION
Anesthesia Post Evaluation    Patient: Carroll Roe    Procedure(s) Performed: Procedure(s) (LRB):  VITRECTOMY, PARS PLANA APPROACH (Right)    Final Anesthesia Type: MAC  Patient location during evaluation: PACU  Patient participation: Yes- Able to Participate  Level of consciousness: awake and alert  Post-procedure vital signs: reviewed and stable  Pain management: adequate  Airway patency: patent  PONV status at discharge: No PONV  Anesthetic complications: no      Cardiovascular status: hemodynamically stable  Respiratory status: room air, spontaneous ventilation and unassisted  Hydration status: euvolemic  Follow-up not needed.          Vitals Value Taken Time   /60 4/24/2019 11:38 AM   Temp 36.9 °C (98.5 °F) 4/24/2019  9:35 AM   Pulse 57 4/24/2019 11:38 AM   Resp 20 4/24/2019 11:23 AM   SpO2 97 % 4/24/2019 11:38 AM   Vitals shown include unvalidated device data.      No case tracking events are documented in the log.      Pain/Jayden Score: Pain Rating Prior to Med Admin: 5 (4/24/2019 11:28 AM)  Jayden Score: 10 (4/24/2019 11:38 AM)

## 2019-04-24 NOTE — PROGRESS NOTES
Dr. Pickard at bedside explaining results of procedure to patient and spouse. All questions answered.

## 2019-04-24 NOTE — OP NOTE
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    PROCEDURE PERFORMED:  A 25-gauge pars plana vitrectomy with air-fluid exchange,   endolaser, injection of SF6 gas 20% to the right eye.    ENDOLASER PARAMETERS:  Number of spots 670, power 150 to 200 milliwatts,   duration 0.1 seconds.    ATTENDING SURGEON:  LESLYE Pickard M.D.    ASSISTANT SURGEON:  Fellow, Kobe Gimenez.    ANESTHESIA:  Local monitored anesthesia care with a retrobulbar injection of 4.0   mL mixture of 0.75% Marcaine and 2% Xylocaine.    ESTIMATED BLOOD LOSS:  Minimal.    COMPLICATIONS:  None.    DISPOSITION:  Stable to Recovery Room.    INDICATIONS FOR SURGERY:  This is a 54-year-old male who had laser retinopexy to   his right eye two months prior to surgery.  The patient did okay until about   three days prior to presentation where he had a progressive blurriness and   expanding blind spot in his vision.  The patient was found to have a near total   retinal detachment with the tear that was prior lasered had expanded and wrapped   through the prior laser retinopexy.  Decision was made to take the patient back   to surgery to repair the retinal detachment, prevent further vision loss and   hopefully improve some vision.  Risks, benefits, and alternatives of surgery   were discussed in details with risks including loss of vision, loss of eye,   recurrent retinal detachment, infection, hemorrhage, cataract formation, lens   dislocation, glaucoma, hypotony, ptosis and diplopia.  The patient voiced   understanding and wished to proceed with the procedure.    DESCRIPTION OF PROCEDURE:  After proper informed consent was obtained, the   patient was brought back to the operating suite at [facility] where   MAC anesthesia was induced.  Retrobulbar injection was provided as above without    complication.  The patient was prepped and draped in normal sterile fashion for   ophthalmic surgery.  Lid speculum was placed in the right eye.  Standard 3-port   25-gauge pars plana vitrectomy was set up with the infusion cannula inserted 4   mm posterior to the limbus.  The infusion cannula was turned on only and   observed to be free and clear of all underlying retinal tissue.  Supranasal and   supratemporal trocars were also placed 4 mm posterior to the limbus.  The   vitrector and light pipe were introduced in the vitreous cavity and a core   vitrectomy was performed.  Posterior hyaloid was already detachment, was    out to the level of vitreous base.  Scleral depression was used 360   degrees to help with removal of the cortical vitreous, especially around the   primary retinal break with horseshoe flap was trimmed to a new round hole.    Inferotemporally, there was a small patch of lattice with a few small holes.    The vitreous was removed from these areas as well.  The patch of lattice and the   superonasal break were both marked with diathermy and a posterior retinotomy   was created superonasally.  An air-fluid exchange was performed.  The retina   flattened nicely following this maneuver.  Endolaser was applied in a barrier   fashion around all retinal breaks and the lattice patch.  Supratemporal trocars   were removed and it was leaking after gentle massage, so a 7-0 Vicryl suture was   placed transconjunctivally, allowing the wound closed, a 20% SF6 gas mixture   was created, approximately 40 mL were cycled through the eye and the superonasal   and infusion trocars were removed and not leaking after gentle massage.  The   eye was normal pressure via palpation.  Subconjunctival injections of vancomycin   and Decadron were given to the patient.  The drapes were removed from the   patient.  He was washed free of Betadine prep solution.  Maxitrol ointment was   placed in the right eye.  The eye  was patch shielded.  MAC anesthesia was   reversed.  He was brought to the Recovery Room in stable condition, tolerating   the procedure well and Dr. Pickard was present for the entire case.      DAM/IN  dd: 04/24/2019 11:27:09 (CDT)  td: 04/24/2019 14:39:28 (CDT)  Doc ID   #[id number]  Job ID #[id number]    CC:

## 2019-04-24 NOTE — BRIEF OP NOTE
Pre-Op Dx: RRD OD    Post Op Dx: same    Procedure Performed: 25g PPV/AFx/EL/SF6 20% OD  EL #670, P 150-200mW, D 0.1s    Attending Surgeon: Melly    Assistant Surgeon: Ammy    Anesthesia: Local/Mac, retrobulbar injection of 4.0cc mixture 0.75%Marcaine, 2% Xylocaine    Estimated blood loss: Minimal    Complication: None    Specimen: None    Disposition: Stable to recovery    Findings/Outcome: near total RD with break SN and lattice patch inferotemporal    Date of Discharge: 4/24/19    Discharge Disposition: stable to recovery then home    F/U: tomorrow

## 2019-04-24 NOTE — H&P
Pre-Operative History & Physical  Ophthalmology      SUBJECTIVE:     History of Present Illness:  Patient is a 54 y.o. male presents with Retinal detachment of right eye with single break [H33.011].    MEDICATIONS:   No medications prior to admission.       ALLERGIES:   Review of patient's allergies indicates:   Allergen Reactions    Clindamycin Other (See Comments)    Hydromorphone      Other reaction(s): nausea    Rosuvastatin      Other reaction(s): insomnia, insomniaPT TOOK LIPITOR IN PAST AND STATES CAN TAKE.       PAST MEDICAL HISTORY:   Past Medical History:   Diagnosis Date    Arthritis     Cataract     Chicken pox     Gout     Hepatomegaly     Hyperlipidemia     Hypertension     Retinal detachment     Snoring      PAST SURGICAL HISTORY:   Past Surgical History:   Procedure Laterality Date    SHOULDER SURGERY      right    WRIST SURGERY      left x 3     PAST FAMILY HISTORY:   Family History   Problem Relation Age of Onset    Cataracts Mother     Hyperlipidemia Mother     No Known Problems Father      SOCIAL HISTORY:   Social History     Tobacco Use    Smoking status: Former Smoker     Types: Cigarettes    Smokeless tobacco: Former User   Substance Use Topics    Alcohol use: Yes     Comment: rare occasions    Drug use: Not on file        MENTAL STATUS: Alert    REVIEW OF SYSTEMS: Negative    OBJECTIVE:     Vital Signs (Most Recent)       Physical Exam:  General: NAD  HEENT: Atraumatic  Lungs: Adequate respirations, LCTAB  Heart: RRR, No murmur  Abdomen: Soft NT    ASSESSMENT/PLAN:     Patient is a 54 y.o. male with Retinal detachment of right eye with single break [H33.011].     - Plan for surgical correction Plan 25g PPV/EL/AFx/SF6 OD for RRD OD     Local MAC  LOC 35 min        - Risks/benefits/alternatives of the procedure including, but not limited to scarring, bleeding, infection, loss or decreased vision, and/or need for possible repeat surgery discussed with the patient and  family.   - Informed consent obtained prior to surgery and the patient/family voiced good understanding.    Kobe Gimenez  4/24/2019  5:45 AM

## 2019-04-24 NOTE — TRANSFER OF CARE
"Anesthesia Transfer of Care Note    Patient: Carroll Roe    Procedure(s) Performed: Procedure(s) (LRB):  VITRECTOMY, PARS PLANA APPROACH (Right)    Patient location: [facility]    Anesthesia Type: MAC    Transport from OR: Transported from OR on room air with adequate spontaneous ventilation    Post pain: adequate analgesia    Post assessment: no apparent anesthetic complications and tolerated procedure well    Post vital signs: stable    Level of consciousness: awake, alert and oriented    Nausea/Vomiting: no nausea/vomiting    Complications: none    Transfer of care protocol was followed      Last vitals:   Visit Vitals  /74   Pulse (!) 58   Temp 36.9 °C (98.5 °F) (Oral)   Resp 20   Ht 5' 9" (1.753 m)   Wt (!) 140.6 kg (310 lb)   SpO2 95%   BMI 45.78 kg/m²     "

## 2019-04-24 NOTE — ANESTHESIA PREPROCEDURE EVALUATION
04/24/2019  Carroll Roe is a 54 y.o., male     Pre-operative evaluation for Procedure(s) (LRB):  VITRECTOMY, PARS PLANA APPROACH (Right)    Carroll Roe is a 54 y.o. male     LDA:     Prev airway:     Drips:     Patient Active Problem List   Diagnosis    Vitreous hemorrhage of right eye    Retinal tear, right    NS (nuclear sclerosis), bilateral    Chest pain    Essential hypertension    Hyperlipidemia    Morbid obesity with BMI of 45.0-49.9, adult    Retinal detachment of right eye with single break       Review of patient's allergies indicates:   Allergen Reactions    Clindamycin Other (See Comments)    Hydromorphone      Other reaction(s): nausea    Rosuvastatin      Other reaction(s): insomnia, insomniaPT TOOK LIPITOR IN PAST AND STATES CAN TAKE.        No current facility-administered medications on file prior to encounter.      Current Outpatient Medications on File Prior to Encounter   Medication Sig Dispense Refill    allopurinol (ZYLOPRIM) 300 MG tablet Take 300 mg by mouth once daily.      aspirin 81 MG Chew Take 1 tablet (81 mg total) by mouth once daily.  0    atorvastatin (LIPITOR) 40 MG tablet ATORVASTATIN CALCIUM 40 MG TABS      augmented betamethasone (DIPROLENE) 0.05 % lotion APPLY 2 TO 3 DROPS TO AFFECTED EAR AS DIRECTED  0    carvedilol (COREG) 12.5 MG tablet Take 12.5 mg by mouth 2 (two) times daily.       hydrochlorothiazide (HYDRODIURIL) 25 MG tablet TAKE 1 TABLET EVERY MORNING 90 tablet 2    ramipril (ALTACE) 5 MG capsule RAMIPRIL 5 MG CAPS         Past Surgical History:   Procedure Laterality Date    SHOULDER SURGERY      right    WRIST SURGERY      left x 3       Social History     Socioeconomic History    Marital status:      Spouse name: Not on file    Number of children: Not on file    Years of education: Not on file    Highest education level:  Not on file   Occupational History    Not on file   Social Needs    Financial resource strain: Not on file    Food insecurity:     Worry: Not on file     Inability: Not on file    Transportation needs:     Medical: Not on file     Non-medical: Not on file   Tobacco Use    Smoking status: Former Smoker     Types: Cigarettes    Smokeless tobacco: Former User   Substance and Sexual Activity    Alcohol use: Yes     Comment: rare occasions    Drug use: Not on file    Sexual activity: Not on file   Lifestyle    Physical activity:     Days per week: Not on file     Minutes per session: Not on file    Stress: Not on file   Relationships    Social connections:     Talks on phone: Not on file     Gets together: Not on file     Attends [employer] service: Not on file     Active member of club or organization: Not on file     Attends meetings of clubs or organizations: Not on file     Relationship status: Not on file   Other Topics Concern    Not on file   Social History Narrative    Not on file         Vital Signs Range (Last 24H):  Pulse:  [59]   BP: (116)/(77)       CBC: No results for input(s): WBC, RBC, HGB, HCT, PLT, MCV, MCH, MCHC in the last 72 hours.    CMP: No results for input(s): NA, K, CL, CO2, BUN, CREATININE, GLU, MG, PHOS, CALCIUM, ALBUMIN, PROT, ALKPHOS, ALT, AST, BILITOT in the last 72 hours.    INR  No results for input(s): PT, INR, PROTIME, APTT in the last 72 hours.        Diagnostic Studies:      EKD Echo:      .    Anesthesia Evaluation         Review of Systems      Physical Exam  General:  Well nourished    Airway/Jaw/Neck:  Airway Findings: Mouth Opening: Normal Tongue: Normal  General Airway Assessment: Adult  Improves to II with phonation.  TM Distance: Normal, at least 6 cm            Mental Status:  Mental Status Findings:  Cooperative, Alert and Oriented         Anesthesia Plan  Type of Anesthesia, risks & benefits discussed:  Anesthesia Type:  general  Patient's Preference:    Intra-op Monitoring Plan: standard ASA monitors  Intra-op Monitoring Plan Comments:   Post Op Pain Control Plan: multimodal analgesia  Post Op Pain Control Plan Comments:   Induction:   IV  Beta Blocker:  Patient is not currently on a Beta-Blocker (No further documentation required).       Informed Consent: Patient understands risks and agrees with Anesthesia plan.  Questions answered. Anesthesia consent signed with patient.  ASA Score: 3     Day of Surgery Review of History & Physical:    H&P update referred to the surgeon.         Ready For Surgery From Anesthesia Perspective.

## 2019-04-24 NOTE — PLAN OF CARE
Problem: Adult Inpatient Plan of Care  Goal: Plan of Care Review  Outcome: Outcome(s) achieved Date Met: 04/24/19    Discharge instructions and prescription given to patient and spouse. Verbalized understanding. Patient stable, tolerating fluids. No complaints at this time. Patient adequate for discharge.

## 2019-04-25 ENCOUNTER — OFFICE VISIT (OUTPATIENT)
Dept: OPHTHALMOLOGY | Facility: CLINIC | Age: 54
End: 2019-04-25
Payer: COMMERCIAL

## 2019-04-25 VITALS — HEART RATE: 61 BPM | SYSTOLIC BLOOD PRESSURE: 110 MMHG | DIASTOLIC BLOOD PRESSURE: 63 MMHG

## 2019-04-25 DIAGNOSIS — H33.001 MACULA-OFF RHEGMATOGENOUS RETINAL DETACHMENT, RIGHT: Primary | ICD-10-CM

## 2019-04-25 LAB — POCT GLUCOSE: 106 MG/DL (ref 70–110)

## 2019-04-25 PROCEDURE — 99024 POSTOP FOLLOW-UP VISIT: CPT | Mod: S$GLB,,, | Performed by: OPHTHALMOLOGY

## 2019-04-25 PROCEDURE — 99024 PR POST-OP FOLLOW-UP VISIT: ICD-10-PCS | Mod: S$GLB,,, | Performed by: OPHTHALMOLOGY

## 2019-04-25 PROCEDURE — 99999 PR PBB SHADOW E&M-EST. PATIENT-LVL III: CPT | Mod: PBBFAC,,, | Performed by: OPHTHALMOLOGY

## 2019-04-25 PROCEDURE — 99999 PR PBB SHADOW E&M-EST. PATIENT-LVL III: ICD-10-PCS | Mod: PBBFAC,,, | Performed by: OPHTHALMOLOGY

## 2019-04-25 NOTE — PROGRESS NOTES
HPI     1 day post op check PPV OD    Last edited by Rita Gusman on 4/25/2019  8:23 AM. (History)          HPI     DLS 04/01/19 by Dr. ALBAN Pickard MD    55 YO M here today with c/o seeing a black mass in the right eye temporal side x 1 wk and now it's a grey filter that has a rainbow colors and decrease in vision. The images appears as though something is melting. stj   sd    +blurred vision  +light flashes/floaters  -headaches      HPI     Eye Problem      Additional comments: 5 week check              Comments     DLS 2/25/19- vision OD is back. He is still seeing some flashes and eye is tearing a lot       Prior OCT - OD - poor view  OS - No ME      A/P    1. Hemorrhagic PVD OD  2. Small Flap tear at 1:30 OD  S/p laser retinopexy 2/25/19    3. RRD OD  Macula involving x 4-5 days    s/p 25g PPV/EL/AFx/SF6 OD for RRD OD 4/24/19    Doing well, good IOP  Start gtts QID    Right side sleep      4.  Early NS OU

## 2019-04-29 ENCOUNTER — OFFICE VISIT (OUTPATIENT)
Dept: OPHTHALMOLOGY | Facility: CLINIC | Age: 54
End: 2019-04-29
Payer: COMMERCIAL

## 2019-04-29 DIAGNOSIS — H33.001 MACULA-OFF RHEGMATOGENOUS RETINAL DETACHMENT, RIGHT: Primary | ICD-10-CM

## 2019-04-29 DIAGNOSIS — H25.13 NS (NUCLEAR SCLEROSIS), BILATERAL: ICD-10-CM

## 2019-04-29 DIAGNOSIS — H33.011 RETINAL DETACHMENT OF RIGHT EYE WITH SINGLE BREAK: ICD-10-CM

## 2019-04-29 PROCEDURE — 99024 POSTOP FOLLOW-UP VISIT: CPT | Mod: S$GLB,,, | Performed by: OPHTHALMOLOGY

## 2019-04-29 PROCEDURE — 99999 PR PBB SHADOW E&M-EST. PATIENT-LVL III: ICD-10-PCS | Mod: PBBFAC,,, | Performed by: OPHTHALMOLOGY

## 2019-04-29 PROCEDURE — 99024 PR POST-OP FOLLOW-UP VISIT: ICD-10-PCS | Mod: S$GLB,,, | Performed by: OPHTHALMOLOGY

## 2019-04-29 PROCEDURE — 99999 PR PBB SHADOW E&M-EST. PATIENT-LVL III: CPT | Mod: PBBFAC,,, | Performed by: OPHTHALMOLOGY

## 2019-04-29 NOTE — PROGRESS NOTES
HPI     Post-op Evaluation      Additional comments: 4 day po chk              Comments     Vigamox QID OD  HA QID OD  PF QID OD  Maxitrol PAUL QID OD      Prior OCT - OD - poor view  OS - No ME      A/P    1. Hemorrhagic PVD OD  2. Small Flap tear at 1:30 OD  S/p laser retinopexy 2/25/19    3. RRD OD  Macula involving x 4-5 days    s/p 25g PPV/EL/AFx/SF6 OD for RRD OD 4/24/19    Doing well, Mild IOP elevation , will back off on steroids    HA BID until bubble gone  Vig QID x 3 days  PF 3/2/1/0    Right side sleep      4.  Early NS OU      3 weeks

## 2019-04-30 ENCOUNTER — PATIENT MESSAGE (OUTPATIENT)
Dept: OPHTHALMOLOGY | Facility: CLINIC | Age: 54
End: 2019-04-30

## 2019-05-01 ENCOUNTER — PATIENT MESSAGE (OUTPATIENT)
Dept: OPHTHALMOLOGY | Facility: CLINIC | Age: 54
End: 2019-05-01

## 2019-05-06 ENCOUNTER — TELEPHONE (OUTPATIENT)
Dept: OPHTHALMOLOGY | Facility: CLINIC | Age: 54
End: 2019-05-06

## 2019-05-06 ENCOUNTER — PATIENT MESSAGE (OUTPATIENT)
Dept: OPHTHALMOLOGY | Facility: CLINIC | Age: 54
End: 2019-05-06

## 2019-06-10 ENCOUNTER — TELEPHONE (OUTPATIENT)
Dept: OPHTHALMOLOGY | Facility: CLINIC | Age: 54
End: 2019-06-10

## 2019-06-10 ENCOUNTER — OFFICE VISIT (OUTPATIENT)
Dept: OPHTHALMOLOGY | Facility: CLINIC | Age: 54
End: 2019-06-10
Payer: COMMERCIAL

## 2019-06-10 DIAGNOSIS — H33.001 MACULA-OFF RHEGMATOGENOUS RETINAL DETACHMENT, RIGHT: Primary | ICD-10-CM

## 2019-06-10 DIAGNOSIS — H33.001 RHEGMATOGENOUS RETINAL DETACHMENT OF RIGHT EYE: Primary | ICD-10-CM

## 2019-06-10 DIAGNOSIS — H35.21 PROLIFERATIVE VITREORETINOPATHY, RIGHT: ICD-10-CM

## 2019-06-10 PROCEDURE — 99999 PR PBB SHADOW E&M-EST. PATIENT-LVL III: ICD-10-PCS | Mod: PBBFAC,,, | Performed by: OPHTHALMOLOGY

## 2019-06-10 PROCEDURE — 99024 POSTOP FOLLOW-UP VISIT: CPT | Mod: S$GLB,,, | Performed by: OPHTHALMOLOGY

## 2019-06-10 PROCEDURE — 99999 PR PBB SHADOW E&M-EST. PATIENT-LVL III: CPT | Mod: PBBFAC,,, | Performed by: OPHTHALMOLOGY

## 2019-06-10 PROCEDURE — 99024 PR POST-OP FOLLOW-UP VISIT: ICD-10-PCS | Mod: S$GLB,,, | Performed by: OPHTHALMOLOGY

## 2019-06-10 NOTE — PROGRESS NOTES
HPI     Follow-up      Additional comments: mac off rd od              Comments     dls 4-29-19 dr ba        Prior OCT - OD - poor view  OS - No ME      A/P    1. Hemorrhagic PVD OD  2. Small Flap tear at 1:30 OD  S/p laser retinopexy 2/25/19    3. RRD OD  Macula involving x 4-5 days    s/p 25g PPV/EL/AFx/SF6 OD for RRD OD 4/24/19    IOP improved after steroid taper    6/10/19 - recurrent inferior retinal detachment with low grade PVR     Plan repeat 25g repeat PPV/membrane stripping/EL/C3F8 OD (possible oil) for RRD with PVR OD    Local MAC  LOC 50 min    Risks, benefits, and alternatives to treatment discussed in detail with the patient.  The patient voiced understanding and wished to proceed with the procedure      4.  Early NS OU      To OR wednesday

## 2019-06-11 ENCOUNTER — TELEPHONE (OUTPATIENT)
Dept: OPHTHALMOLOGY | Facility: CLINIC | Age: 54
End: 2019-06-11

## 2019-06-11 ENCOUNTER — PATIENT MESSAGE (OUTPATIENT)
Dept: SURGERY | Facility: HOSPITAL | Age: 54
End: 2019-06-11

## 2019-06-11 NOTE — H&P
Pre-Operative History & Physical  Ophthalmology      SUBJECTIVE:     History of Present Illness:  Patient is a 54 y.o. 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    MEDICATIONS:   No medications prior to admission.       ALLERGIES:   Review of patient's allergies indicates:   Allergen Reactions    Clindamycin Other (See Comments)     Dizziness      Hydromorphone      Other reaction(s): nausea    Rosuvastatin      Other reaction(s): insomnia, insomniaPT TOOK LIPITOR IN PAST AND STATES CAN TAKE.       PAST MEDICAL HISTORY:   Past Medical History:   Diagnosis Date    Arthritis     Cataract     Chicken pox     Gout     Hepatomegaly     Hyperlipidemia     Hypertension     Retinal detachment     Snoring      PAST SURGICAL HISTORY:   Past Surgical History:   Procedure Laterality Date    EYE SURGERY      SHOULDER SURGERY      right    VITRECTOMY, PARS PLANA APPROACH Right 4/24/2019    [facility]    WRIST SURGERY      left x 3     PAST FAMILY HISTORY:   Family History   Problem Relation Age of Onset    Cataracts Mother     Hyperlipidemia Mother     No Known Problems Father      SOCIAL HISTORY:   Social History     Tobacco Use    Smoking status: Former Smoker     Types: Cigarettes    Smokeless tobacco: Former User   Substance Use Topics    Alcohol use: Yes     Comment: rare occasions    Drug use: Never        MENTAL STATUS: Alert    REVIEW OF SYSTEMS: Negative    OBJECTIVE:     Vital Signs (Most Recent)       Physical Exam:  General: NAD  HEENT: Atraumatic  Lungs: Adequate respirations, LCTAB  Heart: RRR, No murmur  Abdomen: Soft NT    ASSESSMENT/PLAN:     Patient is a 54 y.o. 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     - Plan for surgical correction Plan repeat 25g repeat PPV/membrane stripping/EL/C3F8 OD (possible oil) for RRD with PVR OD     Local MAC  LOC 50 min   - Risks/benefits/alternatives  of the procedure including, but not limited to scarring, bleeding, infection, loss or decreased vision, and/or need for possible repeat surgery discussed with the patient and family.   - Informed consent obtained prior to surgery and the patient/family voiced good understanding.    Kobe Gimenez  6/11/2019  5:40 PM

## 2019-06-12 ENCOUNTER — HOSPITAL ENCOUNTER (OUTPATIENT)
Facility: HOSPITAL | Age: 54
Discharge: HOME | End: 2019-06-12
Attending: OPHTHALMOLOGY | Admitting: OPHTHALMOLOGY
Payer: COMMERCIAL

## 2019-06-12 ENCOUNTER — ANESTHESIA (OUTPATIENT)
Dept: SURGERY | Facility: HOSPITAL | Age: 54
End: 2019-06-12
Payer: COMMERCIAL

## 2019-06-12 ENCOUNTER — ANESTHESIA EVENT (OUTPATIENT)
Dept: SURGERY | Facility: HOSPITAL | Age: 54
End: 2019-06-12
Payer: COMMERCIAL

## 2019-06-12 VITALS
HEART RATE: 57 BPM | TEMPERATURE: 98 F | BODY MASS INDEX: 45.18 KG/M2 | OXYGEN SATURATION: 95 % | SYSTOLIC BLOOD PRESSURE: 164 MMHG | DIASTOLIC BLOOD PRESSURE: 72 MMHG | RESPIRATION RATE: 18 BRPM | HEIGHT: 69 IN | WEIGHT: 305 LBS

## 2019-06-12 DIAGNOSIS — H35.21 PROLIFERATIVE VITREORETINOPATHY, RIGHT: ICD-10-CM

## 2019-06-12 DIAGNOSIS — H33.011 RETINAL DETACHMENT OF RIGHT EYE WITH SINGLE BREAK: ICD-10-CM

## 2019-06-12 DIAGNOSIS — H33.001 MACULA-OFF RHEGMATOGENOUS RETINAL DETACHMENT, RIGHT: Primary | ICD-10-CM

## 2019-06-12 DIAGNOSIS — H33.21 RETINAL DETACHMENT, RIGHT: ICD-10-CM

## 2019-06-12 PROCEDURE — D9220A PRA ANESTHESIA: Mod: CRNA,,, | Performed by: NURSE ANESTHETIST, CERTIFIED REGISTERED

## 2019-06-12 PROCEDURE — 36000706: Performed by: OPHTHALMOLOGY

## 2019-06-12 PROCEDURE — 37000009 HC ANESTHESIA EA ADD 15 MINS: Performed by: OPHTHALMOLOGY

## 2019-06-12 PROCEDURE — 27201423 OPTIME MED/SURG SUP & DEVICES STERILE SUPPLY: Performed by: OPHTHALMOLOGY

## 2019-06-12 PROCEDURE — 63600175 PHARM REV CODE 636 W HCPCS: Performed by: OPHTHALMOLOGY

## 2019-06-12 PROCEDURE — 37000008 HC ANESTHESIA 1ST 15 MINUTES: Performed by: OPHTHALMOLOGY

## 2019-06-12 PROCEDURE — D9220A PRA ANESTHESIA: Mod: ANES,,, | Performed by: ANESTHESIOLOGY

## 2019-06-12 PROCEDURE — D9220A PRA ANESTHESIA: ICD-10-PCS | Mod: CRNA,,, | Performed by: NURSE ANESTHETIST, CERTIFIED REGISTERED

## 2019-06-12 PROCEDURE — 25000003 PHARM REV CODE 250: Performed by: OPHTHALMOLOGY

## 2019-06-12 PROCEDURE — D9220A PRA ANESTHESIA: ICD-10-PCS | Mod: ANES,,, | Performed by: ANESTHESIOLOGY

## 2019-06-12 PROCEDURE — 71000015 HC POSTOP RECOV 1ST HR: Performed by: OPHTHALMOLOGY

## 2019-06-12 PROCEDURE — 36000707: Performed by: OPHTHALMOLOGY

## 2019-06-12 PROCEDURE — 67113 REPAIR RETINAL DETACH CPLX: CPT | Mod: 79,RT,, | Performed by: OPHTHALMOLOGY

## 2019-06-12 PROCEDURE — 63600175 PHARM REV CODE 636 W HCPCS: Performed by: NURSE ANESTHETIST, CERTIFIED REGISTERED

## 2019-06-12 PROCEDURE — S0020 INJECTION, BUPIVICAINE HYDRO: HCPCS | Performed by: OPHTHALMOLOGY

## 2019-06-12 PROCEDURE — 67113 PR REPAIR COMPLEX RETINA DETACH VITRECTOMY & MEMB PEEL: ICD-10-PCS | Mod: 79,RT,, | Performed by: OPHTHALMOLOGY

## 2019-06-12 RX ORDER — DEXAMETHASONE SODIUM PHOSPHATE 4 MG/ML
INJECTION, SOLUTION INTRA-ARTICULAR; INTRALESIONAL; INTRAMUSCULAR; INTRAVENOUS; SOFT TISSUE
Status: DISCONTINUED
Start: 2019-06-12 | End: 2019-06-12 | Stop reason: HOSPADM

## 2019-06-12 RX ORDER — SODIUM CHLORIDE 0.9 % (FLUSH) 0.9 %
3 SYRINGE (ML) INJECTION
Status: DISCONTINUED | OUTPATIENT
Start: 2019-06-12 | End: 2019-06-12 | Stop reason: HOSPADM

## 2019-06-12 RX ORDER — ONDANSETRON 4 MG/1
4 TABLET, FILM COATED ORAL EVERY 8 HOURS PRN
Qty: 12 TABLET | Refills: 0 | Status: SHIPPED | OUTPATIENT
Start: 2019-06-12 | End: 2019-08-12

## 2019-06-12 RX ORDER — FENTANYL CITRATE 50 UG/ML
50 INJECTION, SOLUTION INTRAMUSCULAR; INTRAVENOUS EVERY 5 MIN PRN
Status: DISCONTINUED | OUTPATIENT
Start: 2019-06-12 | End: 2019-06-12 | Stop reason: HOSPADM

## 2019-06-12 RX ORDER — VANCOMYCIN HYDROCHLORIDE 500 MG/10ML
INJECTION, POWDER, LYOPHILIZED, FOR SOLUTION INTRAVENOUS
Status: DISCONTINUED
Start: 2019-06-12 | End: 2019-06-12 | Stop reason: HOSPADM

## 2019-06-12 RX ORDER — TETRACAINE HYDROCHLORIDE 5 MG/ML
1 SOLUTION OPHTHALMIC
Status: DISCONTINUED | OUTPATIENT
Start: 2019-06-12 | End: 2019-06-12 | Stop reason: HOSPADM

## 2019-06-12 RX ORDER — NEOMYCIN SULFATE, POLYMYXIN B SULFATE, AND DEXAMETHASONE 3.5; 10000; 1 MG/G; [USP'U]/G; MG/G
OINTMENT OPHTHALMIC
Status: DISCONTINUED | OUTPATIENT
Start: 2019-06-12 | End: 2019-06-12 | Stop reason: HOSPADM

## 2019-06-12 RX ORDER — OXYCODONE AND ACETAMINOPHEN 5; 325 MG/1; MG/1
1 TABLET ORAL EVERY 6 HOURS PRN
Qty: 12 TABLET | Refills: 0 | Status: SHIPPED | OUTPATIENT
Start: 2019-06-12 | End: 2019-08-12

## 2019-06-12 RX ORDER — TROPICAMIDE 10 MG/ML
1 SOLUTION/ DROPS OPHTHALMIC
Status: DISCONTINUED | OUTPATIENT
Start: 2019-06-12 | End: 2019-06-12 | Stop reason: HOSPADM

## 2019-06-12 RX ORDER — PREDNISOLONE ACETATE 10 MG/ML
1 SUSPENSION/ DROPS OPHTHALMIC
Status: DISCONTINUED | OUTPATIENT
Start: 2019-06-12 | End: 2019-06-12 | Stop reason: HOSPADM

## 2019-06-12 RX ORDER — DEXAMETHASONE SODIUM PHOSPHATE 4 MG/ML
INJECTION, SOLUTION INTRA-ARTICULAR; INTRALESIONAL; INTRAMUSCULAR; INTRAVENOUS; SOFT TISSUE
Status: DISCONTINUED | OUTPATIENT
Start: 2019-06-12 | End: 2019-06-12 | Stop reason: HOSPADM

## 2019-06-12 RX ORDER — MOXIFLOXACIN 5 MG/ML
1 SOLUTION/ DROPS OPHTHALMIC
Status: DISCONTINUED | OUTPATIENT
Start: 2019-06-12 | End: 2019-06-12 | Stop reason: HOSPADM

## 2019-06-12 RX ORDER — INDOCYANINE GREEN AND WATER 25 MG
KIT INJECTION
Status: DISCONTINUED
Start: 2019-06-12 | End: 2019-06-12 | Stop reason: HOSPADM

## 2019-06-12 RX ORDER — ACETAMINOPHEN 325 MG/1
650 TABLET ORAL EVERY 4 HOURS PRN
Status: DISCONTINUED | OUTPATIENT
Start: 2019-06-12 | End: 2019-06-12 | Stop reason: HOSPADM

## 2019-06-12 RX ORDER — LIDOCAINE HYDROCHLORIDE 20 MG/ML
INJECTION, SOLUTION EPIDURAL; INFILTRATION; INTRACAUDAL; PERINEURAL
Status: DISCONTINUED | OUTPATIENT
Start: 2019-06-12 | End: 2019-06-12 | Stop reason: HOSPADM

## 2019-06-12 RX ORDER — ONDANSETRON 8 MG/1
8 TABLET, ORALLY DISINTEGRATING ORAL EVERY 8 HOURS PRN
Status: DISCONTINUED | OUTPATIENT
Start: 2019-06-12 | End: 2019-06-12 | Stop reason: HOSPADM

## 2019-06-12 RX ORDER — LIDOCAINE HYDROCHLORIDE 20 MG/ML
INJECTION, SOLUTION EPIDURAL; INFILTRATION; INTRACAUDAL; PERINEURAL
Status: DISCONTINUED
Start: 2019-06-12 | End: 2019-06-12 | Stop reason: HOSPADM

## 2019-06-12 RX ORDER — VANCOMYCIN HYDROCHLORIDE 500 MG/10ML
INJECTION, POWDER, LYOPHILIZED, FOR SOLUTION INTRAVENOUS
Status: DISCONTINUED | OUTPATIENT
Start: 2019-06-12 | End: 2019-06-12 | Stop reason: HOSPADM

## 2019-06-12 RX ORDER — CYCLOPENTOLATE HYDROCHLORIDE 10 MG/ML
1 SOLUTION/ DROPS OPHTHALMIC
Status: DISCONTINUED | OUTPATIENT
Start: 2019-06-12 | End: 2019-06-12 | Stop reason: HOSPADM

## 2019-06-12 RX ORDER — HYDROCODONE BITARTRATE AND ACETAMINOPHEN 5; 325 MG/1; MG/1
1 TABLET ORAL EVERY 4 HOURS PRN
Status: DISCONTINUED | OUTPATIENT
Start: 2019-06-12 | End: 2019-06-12 | Stop reason: HOSPADM

## 2019-06-12 RX ORDER — PHENYLEPHRINE HYDROCHLORIDE 25 MG/ML
1 SOLUTION/ DROPS OPHTHALMIC
Status: DISCONTINUED | OUTPATIENT
Start: 2019-06-12 | End: 2019-06-12 | Stop reason: HOSPADM

## 2019-06-12 RX ORDER — FENTANYL CITRATE 50 UG/ML
INJECTION, SOLUTION INTRAMUSCULAR; INTRAVENOUS
Status: DISCONTINUED | OUTPATIENT
Start: 2019-06-12 | End: 2019-06-12

## 2019-06-12 RX ORDER — NEOMYCIN SULFATE, POLYMYXIN B SULFATE, AND DEXAMETHASONE 3.5; 10000; 1 MG/G; [USP'U]/G; MG/G
OINTMENT OPHTHALMIC
Status: DISCONTINUED
Start: 2019-06-12 | End: 2019-06-12 | Stop reason: HOSPADM

## 2019-06-12 RX ORDER — BUPIVACAINE HYDROCHLORIDE 7.5 MG/ML
INJECTION, SOLUTION EPIDURAL; RETROBULBAR
Status: DISCONTINUED
Start: 2019-06-12 | End: 2019-06-12 | Stop reason: HOSPADM

## 2019-06-12 RX ORDER — PROPOFOL 10 MG/ML
VIAL (ML) INTRAVENOUS
Status: DISCONTINUED | OUTPATIENT
Start: 2019-06-12 | End: 2019-06-12

## 2019-06-12 RX ORDER — EPINEPHRINE 1 MG/ML
INJECTION, SOLUTION INTRACARDIAC; INTRAMUSCULAR; INTRAVENOUS; SUBCUTANEOUS
Status: DISCONTINUED
Start: 2019-06-12 | End: 2019-06-12 | Stop reason: HOSPADM

## 2019-06-12 RX ORDER — SODIUM CHLORIDE 9 MG/ML
INJECTION, SOLUTION INTRAVENOUS CONTINUOUS
Status: DISCONTINUED | OUTPATIENT
Start: 2019-06-12 | End: 2019-06-12 | Stop reason: HOSPADM

## 2019-06-12 RX ORDER — BUPIVACAINE HYDROCHLORIDE 7.5 MG/ML
INJECTION, SOLUTION EPIDURAL; RETROBULBAR
Status: DISCONTINUED | OUTPATIENT
Start: 2019-06-12 | End: 2019-06-12 | Stop reason: HOSPADM

## 2019-06-12 RX ORDER — LIDOCAINE HCL/PF 100 MG/5ML
SYRINGE (ML) INTRAVENOUS
Status: DISCONTINUED | OUTPATIENT
Start: 2019-06-12 | End: 2019-06-12

## 2019-06-12 RX ADMIN — LIDOCAINE HYDROCHLORIDE 100 MG: 20 INJECTION, SOLUTION INTRAVENOUS at 10:06

## 2019-06-12 RX ADMIN — FENTANYL CITRATE 50 MCG: 50 INJECTION, SOLUTION INTRAMUSCULAR; INTRAVENOUS at 10:06

## 2019-06-12 RX ADMIN — HOMATROPINE HYDROBROMIDE 1 DROP: 50 SOLUTION OPHTHALMIC at 09:06

## 2019-06-12 RX ADMIN — TROPICAMIDE 1 DROP: 10 SOLUTION/ DROPS OPHTHALMIC at 09:06

## 2019-06-12 RX ADMIN — TETRACAINE HYDROCHLORIDE 1 DROP: 5 SOLUTION OPHTHALMIC at 09:06

## 2019-06-12 RX ADMIN — CYCLOPENTOLATE HYDROCHLORIDE 1 DROP: 10 SOLUTION/ DROPS OPHTHALMIC at 09:06

## 2019-06-12 RX ADMIN — SODIUM CHLORIDE 1000 ML: 0.9 INJECTION, SOLUTION INTRAVENOUS at 09:06

## 2019-06-12 RX ADMIN — PREDNISOLONE ACETATE 1 DROP: 10 SUSPENSION OPHTHALMIC at 09:06

## 2019-06-12 RX ADMIN — HYDROCODONE BITARTRATE AND ACETAMINOPHEN 1 TABLET: 5; 325 TABLET ORAL at 10:06

## 2019-06-12 RX ADMIN — PHENYLEPHRINE HYDROCHLORIDE 1 DROP: 2.5 SOLUTION/ DROPS OPHTHALMIC at 09:06

## 2019-06-12 RX ADMIN — MOXIFLOXACIN HYDROCHLORIDE 1 DROP: 5 SOLUTION/ DROPS OPHTHALMIC at 09:06

## 2019-06-12 RX ADMIN — PROPOFOL 65 MG: 10 INJECTION, EMULSION INTRAVENOUS at 10:06

## 2019-06-12 NOTE — BRIEF OP NOTE
Pre-Op Dx: Recurrent RRD with PVR OD    Post Op Dx: same     Procedure Performed: 25g PPV/membrane stripping/AFx/EL/C3F8 14% OD  EL #943, P 150-180mW, D 0.1s    Attending Surgeon: Melly    Assistant Surgeon: Ammy    Anesthesia: Local/Mac, retrobulbar injection of 4.0cc mixture 0.75%Marcaine, 2% Xylocaine    Estimated blood loss: Minimal    Complication: None    Specimen: None    Disposition: Stable to recovery    Findings/Outcome: Inferior RRD with PVR adjacent to lattice with small break, PVR strands easy removed.  Flattened nicely under air.      Date of Discharge: 6/12/19    Discharge Disposition: stable to recovery then home    F/U: tomorrow

## 2019-06-12 NOTE — OP NOTE
DATE OF PROCEDURE:  06/12/2019.    PREOPERATIVE DIAGNOSIS:  Recurrent rhegmatogenous retinal detachment to the   right eye.    POSTOPERATIVE DIAGNOSIS:  Recurrent rhegmatogenous retinal detachment to the   right eye.    PROCEDURE PERFORMED:  A 25-gauge pars plana vitrectomy with membrane stripping,   air-fluid exchange, and endolaser injection of C3F8 gas 14% to the right eye.    ENDOLASER PARAMETERS:  Number of spots 943, power 150-180 milliwatts, duration   0.1 seconds.    ATTENDING SURGEON:  LESLYE Pickard M.D.    ASSISTANT SURGEON:  Fellow, Saul Gimenez.    ANESTHESIA:  Local monitored anesthesia care with a retrobulbar injection of 4.0   mL mixture of 0.75% Marcaine and 2% Xylocaine.    ESTIMATED BLOOD LOSS:  Minimal.    COMPLICATIONS:  None.    DISPOSITION:  Stable to recovery.    INDICATIONS FOR SURGERY:  This is a 54-year-old male who had prior retinal   detachment repair for a superotemporal macula involving detachment.  The patient   did okay until his followup two days prior to surgery, was found to have a   recurrent inferior detachment with some small PVR stranding and adjacent lattice   with a small break at the 6 o'clock position.  Decision was made to take the   patient to surgery to repair the retinal detachment, prevent further vision loss   and hopefully restore some of the peripheral vision he had lost.  Risks,   benefits, and alternate of surgery were discussed in detail.  The risks   including loss of vision, loss of eye, recurrent retinal detachment, infection,   hemorrhage, cataract formation, lens dislocation, glaucoma, hypotony, ptosis and   diplopia.  The patient voiced understanding and wished to proceed with the   procedure.    DESCRIPTION OF PROCEDURE:  After proper informed consent was obtained, the   patient was brought back to the Operative Suite at [facility] where   MAC anesthesia was induced.  Retrobulbar injection was provided as above without   complication.   The patient was prepped and draped in sterile fashion for   ophthalmic surgery and lid speculum was placed in the right eye.  A standard   3-port 25-gauge pars plana vitrectomy was set up with the infusion cannula   inserted 4 mm posterior to the limbus.  The infusion cannula was turned on only   after observed to be free and clear of all underlying retinal tissue.    Supranasal and supratemporal trocars were also placed 4 mm posterior to the   limbus.  The vitrector and light pipe were introduced in the vitreous cavity and   inspection revealed inferior detachment up to the inferotemporal arcade with   some PVR stranding inferiorly.  This was aspirated off this lattice patch at 6   o'clock position with the vitrector and the retina became a little bit more   mobile following this maneuver, but there was a clear vitreoretinal tuft with a   small break at the nasal aspect of the lattice.  No other identifiable retinal   breaks were found.  The edges of the lattice and the retinal break were marked   with diathermy.  An inferonasal posterior retinotomy was created.  An air-fluid   exchange was performed.  The retina flattened nicely following this maneuver.    Endolaser was applied in a barrier fashion around the posterior retinotomy, the   lattice with a small break as well as in a more thorough anterior barricade   fashion anterior to the vortex veins at the inferior hemisphere.  Supranasal   trocar was removed and not leaking after gentle massage.  A 14% C3F8 gas mixture   was created.  Approximately 40 mL were cycled through the eye.  The   supratemporal infusion trocars were removed and not leaking after gentle   massage.  The eye was normal pressure via palpation.  Subconjunctival injection   of vancomycin and Decadron were given to the patient.  The drapes were removed   from the patient.  He was washed free of Betadine prep solution.  Maxitrol   ointment was placed in the right eye that was patch shielded.   MAC anesthesia   was reversed.  He was brought to the Recovery Room in stable condition,   tolerating the procedure well.  Dr. Pickard was present for the entire case.      KATIE  dd: 06/12/2019 11:00:41 (CDT)  td: 06/12/2019 11:50:26 (CDT)  Doc ID   #[id number]  Job ID #[id number]    CC:

## 2019-06-12 NOTE — PLAN OF CARE
Patient and spouse state they are ready to be discharged. Instructions, prescription and eye bag given to patient and family. Both verbalize understanding. Patient tolerating po liquids with no difficulty. Patient states pain is at a tolerable level for them. Anesthesia consent and surgical consent in chart upon patient's discharge from [facility].

## 2019-06-12 NOTE — INTERVAL H&P NOTE
H&P completed on 6/11/19 has been reviewed, the patient has been examined and:  I concur with the findings and no changes have occurred since H&P was written.    Active Hospital Problems    Diagnosis  POA    Retinal detachment, right [H33.21]  Yes    Proliferative vitreoretinopathy, right [H35.21]  Yes    Macula-off rhegmatogenous retinal detachment, right [H33.001]  Yes      Resolved Hospital Problems   No resolved problems to display.

## 2019-06-12 NOTE — DISCHARGE INSTRUCTIONS
Post Op Instructions:  Patient should Maintain Eye shield & Dressing until seen tomorrow in eye clinic  Tylenol as needed for general discomfort  Use Prescription for pain medication if pain is severe  Use Prescription for Nausea (Zofran) if nausea or vomiting  No excessive exercise   No Bending, Lifting or Straining  Call MD if significant pain or nausea / vomiting uncontrolled by medications  Call MD if temperature in excess of 101' F  Alternate left and right side down postioning for 3-4 days  Return to eye clinic for Post Op Examination tomorrow Morning.  Bring Medicine bag to tomorrow's appointment.      Having a Vitrectomy    A vitrectomy is a type of eye surgery to treat problems with the retina and vitreous. The vitreous is a gel-like substance that fills the middle portion of your eye. During the surgery, your surgeon removes the vitreous and replaces it with another solution.  What to tell your health care provider  Before your surgery, tell your health care provider:  · What medicines you take. This includes over-the-counter medicines such as ibuprofen. It also includes vitamins, herbs, and other supplements. You may need to stop taking some medicines before the procedure, such as blood thinners and aspirin.  · If you smoke. You may need to stop before your surgery. Smoking can delay healing. Talk with your healthcare provider if you need help to stop smoking.  · If youve had recent changes in your health. This includes an infection or fever.  · If you are sensitive or allergic to anything. This includes medicines, latex, tape, and anesthetic medicines.  · If you are pregnant. Also tell your healthcare provider if you think you may be pregnant.  Getting ready for your surgery  Make sure to:  · Ask a family member or friend to take you home from the hospital  · Make plans for some help at home while you recover  · Follow all other instructions from your health care provider  · Read the consent form and  ask questions if something is not clear  · Not eat or drink after midnight before your surgery  Tests before your surgery  Before your surgery, your doctor may look at your retina. Special tools are used to shine a light in your eye and look at your retina. You may need to have your eyes dilated for this eye exam. You also may need to have an ultrasound of your eye. This helps your doctor view the retina. An ultrasound uses sound waves to create images on a computer screen.  On the day of your surgery  Talk with your doctor about what to expect during your surgery. The details of the surgery may differ somewhat. A doctor specially trained in eye surgery (ophthalmologist) will do your operation. In general, you can expect the following:  · You may have general anesthesia. This will cause you to sleep through the surgery. Or you may be awake during the surgery. You will receive a medicine to help you relax. You also may be given anesthetic eye drops and injections. This is to make sure you dont feel anything.  · Your surgeon will make an incision in the outer layer of your eye.  · He or she will make a small cut in the white part of the eye (sclera).  · Your doctor will remove the vitreous and any scar tissue or other material.  · Your doctor will do other repairs to your eye as needed. For example, he or she might use a laser to fix a tear in your retina. In some cases, your doctor may inject a gas bubble into your eye. This is to help keep the retina in place.  · Your doctor will replace the vitreous with another type of fluid. It may be replaced with silicone oil or saline.  · Your doctor will close your incisions with stitches.  · Your doctor will put an antibiotic ointment on your eye to help prevent infection.  · Your eye will be covered with a patch.  After your surgery  Youll likely be able to go home the same day. Have someone drive you home.  Recovering at home  Follow all of your doctors instructions  about eye care. Your eye may be a little sore after the procedure. You can take over-the-counter pain medicine as approved by your healthcare provider. You may need to use eye drops with antibiotics. This is to help prevent infection. You may need to wear an eye patch for a day or so.  If you had a gas bubble placed in your eye during your vitrectomy, you will need to follow specific instructions about positioning. You will also need to not travel on an airplane for a period of time after the surgery. Ask your doctor when it will be safe for you to fly again.  Follow-up care  You will need close follow-up with your doctor to see how well the surgery worked. You may have an appointment the day after the surgery. You may need follow-up surgery in the future to remove the replacement fluid from your eye.  Your vision may not be completely normal after your vitrectomy, especially if you had permanent damage to your retina. Ask your doctor how much improvement you can expect.     When to call your health care provider  Call your health care provider right away if you have any of the below:  · Vision that gets worse  · Pain or swelling around your eye that gets worse   Date Last Reviewed: 6/16/2015  © 0618-7683 [email or web address]. [address] [place] [address]. All rights reserved. This information is not intended as a substitute for professional medical care. Always follow your healthcare professional's instructions.      Recovery After Procedural Sedation (Adult)  You have been given medicine by vein to make you sleep during your surgery. This may have included both a pain medicine and sleeping medicine. Most of the effects have worn off. But you may still have some drowsiness for the next 6 to 8 hours.  Home care  Follow these guidelines when you get home:  · For the next 8 hours, you should be watched by a responsible adult. This person should make sure your condition is not getting worse.  · Don't  drink any alcohol for the next 24 hours.  · Don't drive, operate dangerous machinery, or make important business or personal decisions during the next 24 hours.  Note: Your healthcare provider may tell you not to take any medicine by mouth for pain or sleep in the next 4 hours. These medicines may react with the medicines you were given in the hospital. This could cause a much stronger response than usual.  Follow-up care  Follow up with your healthcare provider if you are not alert and back to your usual level of activity within 12 hours.  When to seek medical advice  Call your healthcare provider right away if any of these occur:  · Drowsiness gets worse  · Weakness or dizziness gets worse  · Repeated vomiting  · You can't be awakened   Date Last Reviewed: 10/18/2016  © 3288-5333 [employer]. [address] [place] [address], [place] [address]. All rights reserved. This information is not intended as a substitute for professional medical care. Always follow your healthcare professional's instructions.    PATIENT INSTRUCTIONS  POST-ANESTHESIA    IMMEDIATELY FOLLOWING SURGERY:  Do not drive or operate machinery for the first twenty four hours after surgery.  Do not make any important decisions for twenty four hours after surgery or while taking narcotic pain medications or sedatives.  If you develop intractable nausea and vomiting or a severe headache please notify your doctor immediately.    FOLLOW-UP:  Please make an appointment with your surgeon as instructed. You do not need to follow up with anesthesia unless specifically instructed to do so.    WOUND CARE INSTRUCTIONS (if applicable):  Keep a dry clean dressing on the anesthesia/puncture wound site if there is drainage.  Once the wound has quit draining you may leave it open to air.  Generally you should leave the bandage intact for twenty four hours unless there is drainage.  If the epidural site drains for more than 36-48 hours please call the anesthesia  department.    QUESTIONS?:  Please feel free to call your physician or the hospital  if you have any questions, and they will be happy to assist you.       [facility] Anesthesia Department  [address] [facility]  [phone]

## 2019-06-12 NOTE — ANESTHESIA PREPROCEDURE EVALUATION
06/12/2019  Carroll Roe is a 54 y.o., male.    Anesthesia Evaluation    I have reviewed the Patient Summary Reports.    I have reviewed the Nursing Notes.   I have reviewed the Medications.     Review of Systems  Anesthesia Hx:  No problems with previous Anesthesia  History of prior surgery of interest to airway management or planning: Denies Family Hx of Anesthesia complications.   Denies Personal Hx of Anesthesia complications.   Cardiovascular:   Hypertension Denies MI.   ECG has been reviewed.    Pulmonary:   Denies COPD. Sleep Apnea    Renal/:  Renal/ Normal     Hepatic/GI:  Hepatic/GI Normal    Musculoskeletal:  Musculoskeletal Normal    Neurological:  Neurology Normal  Denies CVA. Denies Seizures.        Physical Exam  General:  Morbid Obesity    Airway/Jaw/Neck:  Airway Findings: Mouth Opening: Normal Tongue: Normal  General Airway Assessment: Adult  Mallampati: II  Improves to II with phonation.  TM Distance: Normal, at least 6 cm  Jaw/Neck Findings:  Micrognathia: Negative Neck ROM: Normal ROM      Dental:  Dental Findings: In tact   Chest/Lungs:  Chest/Lungs Findings: Clear to auscultation, Normal Respiratory Rate     Heart/Vascular:  Heart Findings: Rate: Normal  Rhythm: Regular Rhythm  Sounds: Normal  Heart murmur: negative    Abdomen:  Abdomen Findings:  Normal, Nontender, Soft       Mental Status:  Mental Status Findings:  Cooperative, Alert and Oriented         Anesthesia Plan  Type of Anesthesia, risks & benefits discussed:  Anesthesia Type:  MAC, general  Patient's Preference:   Intra-op Monitoring Plan:   Intra-op Monitoring Plan Comments:   Post Op Pain Control Plan: multimodal analgesia, IV/PO Opioids PRN and per primary service following discharge from PACU  Post Op Pain Control Plan Comments:   Induction:   IV  Beta Blocker:  Patient is on a Beta-Blocker and has received one  dose within the past 24 hours (No further documentation required).       Informed Consent: Patient understands risks and agrees with Anesthesia plan.  Questions answered. Anesthesia consent signed with patient.  ASA Score: 3     Day of Surgery Review of History & Physical:    H&P update referred to the surgeon.         Ready For Surgery From Anesthesia Perspective.

## 2019-06-12 NOTE — TRANSFER OF CARE
"Anesthesia Transfer of Care Note    Patient: Carroll Roe    Procedure(s) Performed: Procedure(s) (LRB):  25 g PPV MEMBRANE STRIPPING / EL / C3f8 OD poss oil for RRD with PVR OD (Right)    Patient location: PACU    Anesthesia Type: MAC    Transport from OR: Transported from OR on room air with adequate spontaneous ventilation    Post pain: adequate analgesia    Post assessment: no apparent anesthetic complications and tolerated procedure well    Post vital signs: stable    Level of consciousness: awake, alert and oriented    Nausea/Vomiting: no nausea/vomiting    Complications: none    Transfer of care protocol was followed      Last vitals:   Visit Vitals  /85 (BP Location: Left arm, Patient Position: Lying)   Pulse 62   Temp 36.7 °C (98.1 °F) (Oral)   Resp 18   Ht 5' 9" (1.753 m)   Wt (!) 138.3 kg (305 lb)   SpO2 98%   BMI 45.04 kg/m²     "

## 2019-06-12 NOTE — ANESTHESIA RELEASE NOTE
"Anesthesia Release from PACU Note    Patient: Carroll Roe    Procedure(s) Performed: Procedure(s) (LRB):  25 g PPV MEMBRANE STRIPPING / EL / C3f8 OD poss oil for RRD with PVR OD (Right)    Anesthesia type: MAC    Post pain: Adequate analgesia    Post assessment: no apparent anesthetic complications, tolerated procedure well and no evidence of recall    Last Vitals:   Visit Vitals  BP (!) 164/72   Pulse (!) 57   Temp 36.5 °C (97.7 °F) (Skin)   Resp 18   Ht 5' 9" (1.753 m)   Wt (!) 138.3 kg (305 lb)   SpO2 95%   BMI 45.04 kg/m²       Post vital signs: stable    Level of consciousness: awake, alert  and oriented    Nausea/Vomiting: no nausea/no vomiting    Complications: none    Airway Patency: patent    Respiratory: unassisted    Cardiovascular: stable and blood pressure at baseline    Hydration: euvolemic  "

## 2019-06-12 NOTE — ANESTHESIA POSTPROCEDURE EVALUATION
Anesthesia Post Evaluation    Patient: Carroll Roe    Procedure(s) Performed: Procedure(s) (LRB):  25 g PPV MEMBRANE STRIPPING / EL / C3f8 OD poss oil for RRD with PVR OD (Right)    Final Anesthesia Type: MAC  Patient location during evaluation: PACU  Patient participation: Yes- Able to Participate  Level of consciousness: awake and alert  Post-procedure vital signs: reviewed and stable  Pain management: adequate  Airway patency: patent  PONV status at discharge: No PONV  Anesthetic complications: no      Cardiovascular status: stable  Respiratory status: unassisted and spontaneous ventilation  Hydration status: euvolemic  Follow-up not needed.          Vitals Value Taken Time   /72 6/12/2019 11:03 AM   Temp 36.5 °C (97.7 °F) 6/12/2019 10:51 AM   Pulse 60 6/12/2019 11:15 AM   Resp 18 6/12/2019 11:00 AM   SpO2 96 % 6/12/2019 11:15 AM   Vitals shown include unvalidated device data.      No case tracking events are documented in the log.      Pain/Jayden Score: Pain Rating Prior to Med Admin: 8 (6/12/2019 10:55 AM)  Jayden Score: 9 (6/12/2019 10:51 AM)

## 2019-06-13 ENCOUNTER — OFFICE VISIT (OUTPATIENT)
Dept: OPHTHALMOLOGY | Facility: CLINIC | Age: 54
End: 2019-06-13
Payer: COMMERCIAL

## 2019-06-13 VITALS — SYSTOLIC BLOOD PRESSURE: 120 MMHG | DIASTOLIC BLOOD PRESSURE: 75 MMHG | HEART RATE: 63 BPM

## 2019-06-13 DIAGNOSIS — H33.001 MACULA-OFF RHEGMATOGENOUS RETINAL DETACHMENT, RIGHT: Primary | ICD-10-CM

## 2019-06-13 DIAGNOSIS — H35.21 PROLIFERATIVE VITREORETINOPATHY, RIGHT: ICD-10-CM

## 2019-06-13 PROCEDURE — 99024 PR POST-OP FOLLOW-UP VISIT: ICD-10-PCS | Mod: S$GLB,,, | Performed by: OPHTHALMOLOGY

## 2019-06-13 PROCEDURE — 99999 PR PBB SHADOW E&M-EST. PATIENT-LVL III: ICD-10-PCS | Mod: PBBFAC,,, | Performed by: OPHTHALMOLOGY

## 2019-06-13 PROCEDURE — 99024 POSTOP FOLLOW-UP VISIT: CPT | Mod: S$GLB,,, | Performed by: OPHTHALMOLOGY

## 2019-06-13 PROCEDURE — 99999 PR PBB SHADOW E&M-EST. PATIENT-LVL III: CPT | Mod: PBBFAC,,, | Performed by: OPHTHALMOLOGY

## 2019-06-13 RX ORDER — METFORMIN HYDROCHLORIDE 500 MG/1
TABLET, EXTENDED RELEASE ORAL 2 TIMES DAILY WITH MEALS
Refills: 3 | COMMUNITY
Start: 2019-05-24 | End: 2019-12-18

## 2019-06-13 NOTE — ANESTHESIA POSTPROCEDURE EVALUATION
Anesthesia Post Evaluation    Patient: Carroll Roe    Procedure(s) Performed: Procedure(s) (LRB):  25 g PPV MEMBRANE STRIPPING / EL / C3f8 OD poss oil for RRD with PVR OD (Right)    Final Anesthesia Type: general  Patient location during evaluation: PACU  Patient participation: Yes- Able to Participate  Level of consciousness: awake and alert  Post-procedure vital signs: reviewed and stable  Pain management: adequate  Airway patency: patent  PONV status at discharge: No PONV  Anesthetic complications: no      Cardiovascular status: stable  Respiratory status: unassisted and spontaneous ventilation  Hydration status: euvolemic  Follow-up not needed.          Vitals Value Taken Time   /73 6/12/2019 11:18 AM   Temp 36.5 °C (97.7 °F) 6/12/2019 10:51 AM   Pulse 57 6/12/2019 11:21 AM   Resp 18 6/12/2019 11:00 AM   SpO2 96 % 6/12/2019 11:21 AM   Vitals shown include unvalidated device data.      No case tracking events are documented in the log.      Pain/Jayden Score: Pain Rating Prior to Med Admin: 8 (6/12/2019 10:55 AM)  Jayden Score: 9 (6/12/2019 10:51 AM)

## 2019-06-13 NOTE — PROGRESS NOTES
HPI     Follow-up      Additional comments: mac off rd od              Comments     dls 4-29-19 dr ba        Prior OCT - OD - poor view  OS - No ME      A/P    1. Hemorrhagic PVD OD  2. Small Flap tear at 1:30 OD  S/p laser retinopexy 2/25/19    3. RRD OD  Macula involving x 4-5 days    s/p 25g PPV/EL/AFx/SF6 OD for RRD OD 4/24/19    IOP improved after steroid taper    6/10/19 - recurrent inferior retinal detachment with low grade PVR     s/p repeat 25g repeat PPV/membrane stripping/EL/C3F8 OD (possible oil) for RRD with PVR OD 6/12/19    Doing well, good IOP  Start gtts QID, ointment/shield QHS    Side position x 3-4 days      4.  Early NS OU      1 week

## 2019-06-18 ENCOUNTER — OFFICE VISIT (OUTPATIENT)
Dept: OPHTHALMOLOGY | Facility: CLINIC | Age: 54
End: 2019-06-18
Payer: COMMERCIAL

## 2019-06-18 DIAGNOSIS — H33.001 MACULA-OFF RHEGMATOGENOUS RETINAL DETACHMENT, RIGHT: Primary | ICD-10-CM

## 2019-06-18 PROCEDURE — 99024 PR POST-OP FOLLOW-UP VISIT: ICD-10-PCS | Mod: S$GLB,,, | Performed by: OPHTHALMOLOGY

## 2019-06-18 PROCEDURE — 99999 PR PBB SHADOW E&M-EST. PATIENT-LVL III: ICD-10-PCS | Mod: PBBFAC,,, | Performed by: OPHTHALMOLOGY

## 2019-06-18 PROCEDURE — 99999 PR PBB SHADOW E&M-EST. PATIENT-LVL III: CPT | Mod: PBBFAC,,, | Performed by: OPHTHALMOLOGY

## 2019-06-18 PROCEDURE — 99024 POSTOP FOLLOW-UP VISIT: CPT | Mod: S$GLB,,, | Performed by: OPHTHALMOLOGY

## 2019-06-18 NOTE — PROGRESS NOTES
HPI     5 day post /op,,,, PPV//MEM STRIPPING//    Pt sts no improvement bubble still in eye can see a little at top of bubble.   Denies pain just headaches     EYE MEDS    PF qid OD  Vigamox   Qid OD  HA  Qid OD  Neomycin ojsseline qhs OD    Last edited by Paulina Granados on 6/18/2019  9:32 AM. (History)            Prior OCT - OD - poor view  OS - No ME      A/P    1. Hemorrhagic PVD OD  2. Small Flap tear at 1:30 OD  S/p laser retinopexy 2/25/19    3. RRD OD  Macula involving x 4-5 days    s/p 25g PPV/EL/AFx/SF6 OD for RRD OD 4/24/19    IOP improved after steroid taper    6/10/19 - recurrent inferior retinal detachment with low grade PVR     s/p repeat 25g repeat PPV/membrane stripping/EL/C3F8 ODfor RRD with PVR OD 6/12/19    Doing well, IOP elevated - Combigan administered today - no order available in epic - continue BID at home  Continue gtts QID until Thursday  Then HA/PF BID  Add Combigan BID      4.  Early NS OU      3-4 weeks

## 2019-06-19 ENCOUNTER — PATIENT MESSAGE (OUTPATIENT)
Dept: OPHTHALMOLOGY | Facility: CLINIC | Age: 54
End: 2019-06-19

## 2019-06-20 ENCOUNTER — PATIENT MESSAGE (OUTPATIENT)
Dept: OPHTHALMOLOGY | Facility: CLINIC | Age: 54
End: 2019-06-20

## 2019-07-18 ENCOUNTER — OFFICE VISIT (OUTPATIENT)
Dept: OPHTHALMOLOGY | Facility: CLINIC | Age: 54
End: 2019-07-18
Payer: COMMERCIAL

## 2019-07-18 DIAGNOSIS — H33.001 MACULA-OFF RHEGMATOGENOUS RETINAL DETACHMENT, RIGHT: Primary | ICD-10-CM

## 2019-07-18 DIAGNOSIS — H35.21 PROLIFERATIVE VITREORETINOPATHY, RIGHT: ICD-10-CM

## 2019-07-18 PROCEDURE — 99024 PR POST-OP FOLLOW-UP VISIT: ICD-10-PCS | Mod: S$GLB,,, | Performed by: OPHTHALMOLOGY

## 2019-07-18 PROCEDURE — 99999 PR PBB SHADOW E&M-EST. PATIENT-LVL III: ICD-10-PCS | Mod: PBBFAC,,, | Performed by: OPHTHALMOLOGY

## 2019-07-18 PROCEDURE — 99999 PR PBB SHADOW E&M-EST. PATIENT-LVL III: CPT | Mod: PBBFAC,,, | Performed by: OPHTHALMOLOGY

## 2019-07-18 PROCEDURE — 99024 POSTOP FOLLOW-UP VISIT: CPT | Mod: S$GLB,,, | Performed by: OPHTHALMOLOGY

## 2019-07-18 NOTE — PROGRESS NOTES
"HPI     Post-op Evaluation      Additional comments: 4 wk po chk              Comments     Patient states he still sees the "bubble" in his OD va.     PPV//MEM STRIPPING//    EYE MEDS    PF BID OD  HA  BID OD  Combigan BID OD           Prior OCT - OD - poor view  OS - No ME      A/P    1. Hemorrhagic PVD OD  2. Small Flap tear at 1:30 OD  S/p laser retinopexy 2/25/19    3. RRD OD  Macula involving x 4-5 days    s/p 25g PPV/EL/AFx/SF6 OD for RRD OD 4/24/19    IOP improved after steroid taper    6/10/19 - recurrent inferior retinal detachment with low grade PVR     s/p repeat 25g repeat PPV/membrane stripping/EL/C3F8 ODfor RRD with PVR OD 6/12/19    Doing well, IOP improved on combigan  Then HA/PF/combigan Q day  Until out    4.  Early NS OS  Posterior lens changes OD      4 weeks OCT  "

## 2019-08-12 ENCOUNTER — PROCEDURE VISIT (OUTPATIENT)
Dept: OPHTHALMOLOGY | Facility: CLINIC | Age: 54
End: 2019-08-12
Payer: COMMERCIAL

## 2019-08-12 DIAGNOSIS — H33.001 MACULA-OFF RHEGMATOGENOUS RETINAL DETACHMENT, RIGHT: Primary | ICD-10-CM

## 2019-08-12 PROCEDURE — 99024 POSTOP FOLLOW-UP VISIT: CPT | Mod: S$GLB,,, | Performed by: OPHTHALMOLOGY

## 2019-08-12 PROCEDURE — 92134 POSTERIOR SEGMENT OCT RETINA (OCULAR COHERENCE TOMOGRAPHY)-BOTH EYES: ICD-10-PCS | Mod: S$GLB,,, | Performed by: OPHTHALMOLOGY

## 2019-08-12 PROCEDURE — 99024 PR POST-OP FOLLOW-UP VISIT: ICD-10-PCS | Mod: S$GLB,,, | Performed by: OPHTHALMOLOGY

## 2019-08-12 PROCEDURE — 92134 CPTRZ OPH DX IMG PST SGM RTA: CPT | Mod: S$GLB,,, | Performed by: OPHTHALMOLOGY

## 2019-08-12 NOTE — PROGRESS NOTES
"HPI     4 wk OCT     Pt sts no problems other than tearing in OD Constantly. Still seeing bubble in OD not taking any gtts will be going on a plane to   travel in 5 weeks sept 18th Denies pain just red and tearing (Allergy eyes)     (-)Flashes (+)Floaters  (+)Photophobia OU   (+)Glare headlights look like stars       HPI     Post-op Evaluation      Additional comments: 4 wk po chk              Comments     Patient states he still sees the "bubble" in his OD va.     PPV//MEM STRIPPING//    EYE MEDS    PF BID OD  HA  BID OD  Combigan BID OD            OCT - OD - trace SRF  OS - No ME      A/P    1. Hemorrhagic PVD OD  2. Small Flap tear at 1:30 OD  S/p laser retinopexy 2/25/19    3. RRD OD  Macula involving x 4-5 days    s/p 25g PPV/EL/AFx/SF6 OD for RRD OD 4/24/19    IOP improved after steroid taper    6/10/19 - recurrent inferior retinal detachment with low grade PVR     s/p repeat 25g repeat PPV/membrane stripping/EL/C3F8 ODfor RRD with PVR OD 6/12/19    Doing well, IOP better now off gtts    4.  Early NS OS  Posterior lens changes OD  PH to 20/60      2 months OCT/MRx  "

## 2019-10-14 ENCOUNTER — OFFICE VISIT (OUTPATIENT)
Dept: OPHTHALMOLOGY | Facility: CLINIC | Age: 54
End: 2019-10-14
Payer: COMMERCIAL

## 2019-10-14 DIAGNOSIS — H33.001 MACULA-OFF RHEGMATOGENOUS RETINAL DETACHMENT, RIGHT: Primary | ICD-10-CM

## 2019-10-14 DIAGNOSIS — H35.21 PROLIFERATIVE VITREORETINOPATHY, RIGHT: ICD-10-CM

## 2019-10-14 PROCEDURE — 99999 PR PBB SHADOW E&M-EST. PATIENT-LVL III: ICD-10-PCS | Mod: PBBFAC,,, | Performed by: OPHTHALMOLOGY

## 2019-10-14 PROCEDURE — 99999 PR PBB SHADOW E&M-EST. PATIENT-LVL III: CPT | Mod: PBBFAC,,, | Performed by: OPHTHALMOLOGY

## 2019-10-14 PROCEDURE — 92134 CPTRZ OPH DX IMG PST SGM RTA: CPT | Mod: S$GLB,,, | Performed by: OPHTHALMOLOGY

## 2019-10-14 PROCEDURE — 92226 PR SPECIAL EYE EXAM, SUBSEQUENT: ICD-10-PCS | Mod: RT,S$GLB,, | Performed by: OPHTHALMOLOGY

## 2019-10-14 PROCEDURE — 92014 PR EYE EXAM, EST PATIENT,COMPREHESV: ICD-10-PCS | Mod: S$GLB,,, | Performed by: OPHTHALMOLOGY

## 2019-10-14 PROCEDURE — 92226 PR SPECIAL EYE EXAM, SUBSEQUENT: CPT | Mod: RT,S$GLB,, | Performed by: OPHTHALMOLOGY

## 2019-10-14 PROCEDURE — 92134 POSTERIOR SEGMENT OCT RETINA (OCULAR COHERENCE TOMOGRAPHY)-BOTH EYES: ICD-10-PCS | Mod: S$GLB,,, | Performed by: OPHTHALMOLOGY

## 2019-10-14 PROCEDURE — 92014 COMPRE OPH EXAM EST PT 1/>: CPT | Mod: S$GLB,,, | Performed by: OPHTHALMOLOGY

## 2019-10-14 NOTE — PROGRESS NOTES
HPI     DLS: 08/12/2019 Dr. Pickard         OCT - OD - trace SRF - loculated, improving  OS - No ME      A/P    1. Hemorrhagic PVD OD  2. Small Flap tear at 1:30 OD  S/p laser retinopexy 2/25/19    3. RRD OD  Macula involving x 4-5 days    s/p 25g PPV/EL/AFx/SF6 OD for RRD OD 4/24/19    IOP improved after steroid taper    6/10/19 - recurrent inferior retinal detachment with low grade PVR     s/p repeat 25g repeat PPV/membrane stripping/EL/C3F8 ODfor RRD with PVR OD 6/12/19    Doing well, IOP better now off gtts    4.  Early NS OS  Posterior lens changes OD increasing  Ok for CE      4 months OCT

## 2019-11-08 ENCOUNTER — OFFICE VISIT (OUTPATIENT)
Dept: OPHTHALMOLOGY | Facility: CLINIC | Age: 54
End: 2019-11-08
Payer: COMMERCIAL

## 2019-11-08 DIAGNOSIS — H35.21 PROLIFERATIVE VITREORETINOPATHY, RIGHT: ICD-10-CM

## 2019-11-08 DIAGNOSIS — H33.001 MACULA-OFF RHEGMATOGENOUS RETINAL DETACHMENT, RIGHT: ICD-10-CM

## 2019-11-08 DIAGNOSIS — H25.11 AGE-RELATED NUCLEAR CATARACT OF RIGHT EYE: Primary | ICD-10-CM

## 2019-11-08 DIAGNOSIS — Z98.890 HX OF LASIK: ICD-10-CM

## 2019-11-08 PROCEDURE — 92015 PR REFRACTION: ICD-10-PCS | Mod: S$GLB,,, | Performed by: OPHTHALMOLOGY

## 2019-11-08 PROCEDURE — 99999 PR PBB SHADOW E&M-EST. PATIENT-LVL III: CPT | Mod: PBBFAC,,, | Performed by: OPHTHALMOLOGY

## 2019-11-08 PROCEDURE — 92014 PR EYE EXAM, EST PATIENT,COMPREHESV: ICD-10-PCS | Mod: S$GLB,,, | Performed by: OPHTHALMOLOGY

## 2019-11-08 PROCEDURE — 92015 DETERMINE REFRACTIVE STATE: CPT | Mod: S$GLB,,, | Performed by: OPHTHALMOLOGY

## 2019-11-08 PROCEDURE — 99999 PR PBB SHADOW E&M-EST. PATIENT-LVL III: ICD-10-PCS | Mod: PBBFAC,,, | Performed by: OPHTHALMOLOGY

## 2019-11-08 PROCEDURE — 92014 COMPRE OPH EXAM EST PT 1/>: CPT | Mod: S$GLB,,, | Performed by: OPHTHALMOLOGY

## 2019-11-08 NOTE — PROGRESS NOTES
HPI     Blurred Vision      Additional comments: Dr Ana conroy for pt to be checked for Cat OD//              Comments     Dr Ana conroy for pt to be checked for Cat OD//  No flashes or floaters   noted since ret tear OD//          Last edited by Estela Alvarado on 11/8/2019  2:47 PM. (History)        ROS     Negative for: Constitutional, Gastrointestinal, Neurological, Skin,   Genitourinary, Musculoskeletal, HENT, Endocrine, Cardiovascular, Eyes,   Respiratory, Psychiatric, Allergic/Imm, Heme/Lymph    Last edited by Brett Lopez Jr., MD on 11/8/2019  3:54 PM. (History)        Assessment /Plan     For exam results, see Encounter Report.    Age-related nuclear cataract of right eye    Macula-off rhegmatogenous retinal detachment, right    Proliferative vitreoretinopathy, right    Hx of LASIK    -schedule cataract surgery Right  eye  -R&B benefits discussed with patient  -Risks of worse vision, loss of vision, infection, bleeding, re-surgery,and may need to have surgery done by a different physician was explained to the patient  -patient was also counseled on premium lens options, laser cataract, and astigmatic correction  -patient was also counseled that they may still need glasses after surgery to help improve their vision, especially the need for reading glasses for reading after surgery  -patient understood the risks involved with cataract surgery and wanted to move forward with surgery

## 2019-11-08 NOTE — PATIENT INSTRUCTIONS
Small-Incision Cataract Surgery: Implanting the New Lens    Once your old lens has been removed, your doctor slips the new lens (IOL or intraocular lens) in through the incision. The IOL is then placed in the capsule that held your old lens. With the new lens in place, your doctor is ready to close the incision. Some incisions may be closed with stitches. Others are self-sealing. That means they will stay closed on their own without stitches. The IOL does much the same thing as your old lens did before it became cloudy. It focuses light, letting you see sharp images and vivid colors. The IOL normally lasts a lifetime.  How small is an IOL?     Date Last Reviewed: 6/14/2015  © 2745-7451 [employer]. [address] [place] [address], [place] [address]. All rights reserved. This information is not intended as a substitute for professional medical care. Always follow your healthcare professional's instructions.

## 2019-11-22 ENCOUNTER — OFFICE VISIT (OUTPATIENT)
Dept: OPHTHALMOLOGY | Facility: CLINIC | Age: 54
End: 2019-11-22
Payer: COMMERCIAL

## 2019-11-22 DIAGNOSIS — H25.11 AGE-RELATED NUCLEAR CATARACT OF RIGHT EYE: Primary | ICD-10-CM

## 2019-11-22 PROCEDURE — 99999 PR PBB SHADOW E&M-EST. PATIENT-LVL III: ICD-10-PCS | Mod: PBBFAC,,, | Performed by: OPHTHALMOLOGY

## 2019-11-22 PROCEDURE — 92025 CPTRIZED CORNEAL TOPOGRAPHY: CPT | Mod: S$GLB,,, | Performed by: OPHTHALMOLOGY

## 2019-11-22 PROCEDURE — 92025 CORNEAL TOPOGRAPHY - OD - RIGHT EYE: ICD-10-PCS | Mod: S$GLB,,, | Performed by: OPHTHALMOLOGY

## 2019-11-22 PROCEDURE — 99499 UNLISTED E&M SERVICE: CPT | Mod: S$GLB,,, | Performed by: OPHTHALMOLOGY

## 2019-11-22 PROCEDURE — 92136 OPHTHALMIC BIOMETRY: CPT | Mod: RT,S$GLB,, | Performed by: OPHTHALMOLOGY

## 2019-11-22 PROCEDURE — 92136 IOL MASTER - OD - RIGHT EYE: ICD-10-PCS | Mod: RT,S$GLB,, | Performed by: OPHTHALMOLOGY

## 2019-11-22 PROCEDURE — 99499 NO LOS: ICD-10-PCS | Mod: S$GLB,,, | Performed by: OPHTHALMOLOGY

## 2019-11-22 PROCEDURE — 99999 PR PBB SHADOW E&M-EST. PATIENT-LVL III: CPT | Mod: PBBFAC,,, | Performed by: OPHTHALMOLOGY

## 2019-11-22 RX ORDER — DICLOFENAC SODIUM 1 MG/ML
1 SOLUTION/ DROPS OPHTHALMIC 4 TIMES DAILY
Qty: 5 ML | Refills: 3 | Status: SHIPPED | OUTPATIENT
Start: 2019-12-09 | End: 2020-01-08

## 2019-11-22 RX ORDER — MOXIFLOXACIN 5 MG/ML
1 SOLUTION/ DROPS OPHTHALMIC 4 TIMES DAILY
Qty: 3 ML | Refills: 1 | Status: SHIPPED | OUTPATIENT
Start: 2019-12-13 | End: 2019-12-20

## 2019-11-22 RX ORDER — PHENYLEPHRINE HYDROCHLORIDE 25 MG/ML
1 SOLUTION/ DROPS OPHTHALMIC
Status: CANCELLED | OUTPATIENT
Start: 2019-11-22 | End: 2019-11-22

## 2019-11-22 RX ORDER — PREDNISOLONE ACETATE 10 MG/ML
1 SUSPENSION/ DROPS OPHTHALMIC 4 TIMES DAILY
Qty: 5 ML | Refills: 3 | Status: SHIPPED | OUTPATIENT
Start: 2019-12-13 | End: 2020-01-12

## 2019-11-22 RX ORDER — TROPICAMIDE 10 MG/ML
1 SOLUTION/ DROPS OPHTHALMIC
Status: CANCELLED | OUTPATIENT
Start: 2019-11-22 | End: 2019-11-22

## 2019-11-22 RX ORDER — PHENYLEPHRINE HYDROCHLORIDE 100 MG/ML
1 SOLUTION/ DROPS OPHTHALMIC
Status: CANCELLED | OUTPATIENT
Start: 2019-11-22 | End: 2019-11-22

## 2019-11-22 RX ORDER — PROPARACAINE HYDROCHLORIDE 5 MG/ML
1 SOLUTION/ DROPS OPHTHALMIC
Status: CANCELLED | OUTPATIENT
Start: 2019-11-22 | End: 2019-11-22

## 2019-11-22 NOTE — PROGRESS NOTES
HPI     Pre-op Exam      Additional comments: Pre-op exam for cataract surgery              Comments     Pt is here for pre-op for cataract surgery OD.    LBSL: does not ck   LA1C: unknown              Last edited by Cheryle Quintana on 11/22/2019  3:54 PM. (History)            Assessment /Plan     For exam results, see Encounter Report.    Age-related nuclear cataract of right eye  -     Case Request Operating Room: PHACOEMULSIFICATION, CATARACT  -     moxifloxacin (VIGAMOX) 0.5 % ophthalmic solution; Place 1 drop into the right eye 4 (four) times daily. for 7 days  Dispense: 3 mL; Refill: 1  -     prednisoLONE acetate (PRED FORTE) 1 % DrpS; Place 1 drop into the right eye 4 (four) times daily.  Dispense: 5 mL; Refill: 3  -     diclofenac (VOLTAREN) 0.1 % ophthalmic solution; Place 1 drop into the right eye 4 (four) times daily. Start drops 3 days before surgery  Dispense: 5 mL; Refill: 3  -     Corneal Topography - OD - Right Eye  -     IOL Master - OD - Right Eye

## 2019-11-22 NOTE — H&P (VIEW-ONLY)
CC: H&P for cataract surgery  HPI: Patient has been diagnosed with cataracts, which are interfering with daily activities due to blurred vision and glare which cannot adequately be corrected with glasses.   PMH:  Past Medical History:   Diagnosis Date    Arthritis     Cataract     OU    Chicken pox     Gout     Hepatomegaly     Hyperlipidemia     Hypertension     Retinal detachment     Snoring        FH:  Family History   Problem Relation Age of Onset    Cataracts Mother     Hyperlipidemia Mother     Macular degeneration Mother     Diabetes Father     Cataracts Sister     Macular degeneration Sister     Cataracts Brother     Cataracts Paternal Grandmother     Glaucoma Paternal Grandmother     Amblyopia Neg Hx     Blindness Neg Hx     Cancer Neg Hx     Hypertension Neg Hx     Retinal detachment Neg Hx     Strabismus Neg Hx     Stroke Neg Hx     Thyroid disease Neg Hx        SH:  Social History     Socioeconomic History    Marital status:      Spouse name: Not on file    Number of children: Not on file    Years of education: Not on file    Highest education level: Not on file   Occupational History    Not on file   Social Needs    Financial resource strain: Not on file    Food insecurity:     Worry: Not on file     Inability: Not on file    Transportation needs:     Medical: Not on file     Non-medical: Not on file   Tobacco Use    Smoking status: Former Smoker     Types: Cigarettes     Last attempt to quit: 2011     Years since quittin.8    Smokeless tobacco: Former User   Substance and Sexual Activity    Alcohol use: Yes     Comment: rare occasions    Drug use: Never    Sexual activity: Not on file   Lifestyle    Physical activity:     Days per week: Not on file     Minutes per session: Not on file    Stress: Not on file   Relationships    Social connections:     Talks on phone: Not on file     Gets together: Not on file     Attends Sabianist service: Not on file      Active member of club or organization: Not on file     Attends meetings of clubs or organizations: Not on file     Relationship status: Not on file   Other Topics Concern    Not on file   Social History Narrative    Not on file       ROS:  HEENT: Blurred vision  CV: No chest pain  Pulm: No SOB  Please see my last exam note for additional ROS details    PE:  BP:  Pulse:  HEENT: Cataract  CV: N S1 S2 no murmurs  Pulm: CTAB no wheezes, rales, or ronchi  Abd: soft nabs NTND no masses  Extremeties: no edema    A/P  1. Cataract - Indications, risks and alternatives to the procedure were explained to the patient. The patient was given the opportunity to ask questions and consented to the procedure in writing.  Proceed with cataract surgery as scheduled.  2. Other health issues - patient has been cleared by their PCP. See last note for details.

## 2019-11-22 NOTE — H&P
CC: H&P for cataract surgery  HPI: Patient has been diagnosed with cataracts, which are interfering with daily activities due to blurred vision and glare which cannot adequately be corrected with glasses.   PMH:  Past Medical History:   Diagnosis Date    Arthritis     Cataract     OU    Chicken pox     Gout     Hepatomegaly     Hyperlipidemia     Hypertension     Retinal detachment     Snoring        FH:  Family History   Problem Relation Age of Onset    Cataracts Mother     Hyperlipidemia Mother     Macular degeneration Mother     Diabetes Father     Cataracts Sister     Macular degeneration Sister     Cataracts Brother     Cataracts Paternal Grandmother     Glaucoma Paternal Grandmother     Amblyopia Neg Hx     Blindness Neg Hx     Cancer Neg Hx     Hypertension Neg Hx     Retinal detachment Neg Hx     Strabismus Neg Hx     Stroke Neg Hx     Thyroid disease Neg Hx        SH:  Social History     Socioeconomic History    Marital status:      Spouse name: Not on file    Number of children: Not on file    Years of education: Not on file    Highest education level: Not on file   Occupational History    Not on file   Social Needs    Financial resource strain: Not on file    Food insecurity:     Worry: Not on file     Inability: Not on file    Transportation needs:     Medical: Not on file     Non-medical: Not on file   Tobacco Use    Smoking status: Former Smoker     Types: Cigarettes     Last attempt to quit: 2011     Years since quittin.8    Smokeless tobacco: Former User   Substance and Sexual Activity    Alcohol use: Yes     Comment: rare occasions    Drug use: Never    Sexual activity: Not on file   Lifestyle    Physical activity:     Days per week: Not on file     Minutes per session: Not on file    Stress: Not on file   Relationships    Social connections:     Talks on phone: Not on file     Gets together: Not on file     Attends Judaism service: Not on file      Active member of club or organization: Not on file     Attends meetings of clubs or organizations: Not on file     Relationship status: Not on file   Other Topics Concern    Not on file   Social History Narrative    Not on file       ROS:  HEENT: Blurred vision  CV: No chest pain  Pulm: No SOB  Please see my last exam note for additional ROS details    PE:  BP:  Pulse:  HEENT: Cataract  CV: N S1 S2 no murmurs  Pulm: CTAB no wheezes, rales, or ronchi  Abd: soft nabs NTND no masses  Extremeties: no edema    A/P  1. Cataract - Indications, risks and alternatives to the procedure were explained to the patient. The patient was given the opportunity to ask questions and consented to the procedure in writing.  Proceed with cataract surgery as scheduled.  2. Other health issues - patient has been cleared by their PCP. See last note for details.

## 2019-12-10 ENCOUNTER — ANESTHESIA EVENT (OUTPATIENT)
Dept: SURGERY | Facility: HOSPITAL | Age: 54
End: 2019-12-10
Payer: COMMERCIAL

## 2019-12-12 ENCOUNTER — ANESTHESIA (OUTPATIENT)
Dept: SURGERY | Facility: HOSPITAL | Age: 54
End: 2019-12-12
Payer: COMMERCIAL

## 2019-12-12 ENCOUNTER — HOSPITAL ENCOUNTER (OUTPATIENT)
Facility: HOSPITAL | Age: 54
Discharge: HOME | End: 2019-12-12
Attending: OPHTHALMOLOGY | Admitting: OPHTHALMOLOGY
Payer: COMMERCIAL

## 2019-12-12 VITALS
TEMPERATURE: 98 F | HEIGHT: 69 IN | HEART RATE: 70 BPM | OXYGEN SATURATION: 95 % | RESPIRATION RATE: 14 BRPM | BODY MASS INDEX: 45.32 KG/M2 | SYSTOLIC BLOOD PRESSURE: 115 MMHG | DIASTOLIC BLOOD PRESSURE: 69 MMHG | WEIGHT: 306 LBS

## 2019-12-12 DIAGNOSIS — H25.11 AGE-RELATED NUCLEAR CATARACT OF RIGHT EYE: ICD-10-CM

## 2019-12-12 PROCEDURE — D9220A PRA ANESTHESIA: Mod: ANES,,, | Performed by: ANESTHESIOLOGY

## 2019-12-12 PROCEDURE — V2632 POST CHMBR INTRAOCULAR LENS: HCPCS | Mod: PO | Performed by: OPHTHALMOLOGY

## 2019-12-12 PROCEDURE — D9220A PRA ANESTHESIA: Mod: CRNA,,, | Performed by: NURSE ANESTHETIST, CERTIFIED REGISTERED

## 2019-12-12 PROCEDURE — 37000008 HC ANESTHESIA 1ST 15 MINUTES: Mod: PO | Performed by: OPHTHALMOLOGY

## 2019-12-12 PROCEDURE — 66984 PR REMOVAL, CATARACT, W/INSRT INTRAOC LENS, W/O ENDO CYCLO: ICD-10-PCS | Mod: RT,,, | Performed by: OPHTHALMOLOGY

## 2019-12-12 PROCEDURE — 36000706: Mod: PO | Performed by: OPHTHALMOLOGY

## 2019-12-12 PROCEDURE — 71000033 HC RECOVERY, INTIAL HOUR: Mod: PO | Performed by: OPHTHALMOLOGY

## 2019-12-12 PROCEDURE — 25000003 PHARM REV CODE 250: Mod: PO | Performed by: NURSE ANESTHETIST, CERTIFIED REGISTERED

## 2019-12-12 PROCEDURE — D9220A PRA ANESTHESIA: ICD-10-PCS | Mod: ANES,,, | Performed by: ANESTHESIOLOGY

## 2019-12-12 PROCEDURE — 37000009 HC ANESTHESIA EA ADD 15 MINS: Mod: PO | Performed by: OPHTHALMOLOGY

## 2019-12-12 PROCEDURE — 63600175 PHARM REV CODE 636 W HCPCS: Mod: PO | Performed by: ANESTHESIOLOGY

## 2019-12-12 PROCEDURE — 63600175 PHARM REV CODE 636 W HCPCS: Mod: PO | Performed by: NURSE ANESTHETIST, CERTIFIED REGISTERED

## 2019-12-12 PROCEDURE — 36000707: Mod: PO | Performed by: OPHTHALMOLOGY

## 2019-12-12 PROCEDURE — 63600175 PHARM REV CODE 636 W HCPCS: Mod: PO | Performed by: OPHTHALMOLOGY

## 2019-12-12 PROCEDURE — 25000003 PHARM REV CODE 250: Mod: PO | Performed by: OPHTHALMOLOGY

## 2019-12-12 PROCEDURE — 25000003 PHARM REV CODE 250: Mod: PO

## 2019-12-12 PROCEDURE — 66984 XCAPSL CTRC RMVL W/O ECP: CPT | Mod: RT,,, | Performed by: OPHTHALMOLOGY

## 2019-12-12 PROCEDURE — D9220A PRA ANESTHESIA: ICD-10-PCS | Mod: CRNA,,, | Performed by: NURSE ANESTHETIST, CERTIFIED REGISTERED

## 2019-12-12 DEVICE — LENS 15.5 ACRYSOF: Type: IMPLANTABLE DEVICE | Site: LENS | Status: FUNCTIONAL

## 2019-12-12 RX ORDER — SODIUM CHLORIDE, SODIUM LACTATE, POTASSIUM CHLORIDE, CALCIUM CHLORIDE 600; 310; 30; 20 MG/100ML; MG/100ML; MG/100ML; MG/100ML
INJECTION, SOLUTION INTRAVENOUS CONTINUOUS
Status: DISCONTINUED | OUTPATIENT
Start: 2019-12-12 | End: 2019-12-12 | Stop reason: HOSPADM

## 2019-12-12 RX ORDER — ACETAMINOPHEN 325 MG/1
650 TABLET ORAL EVERY 4 HOURS PRN
Status: DISCONTINUED | OUTPATIENT
Start: 2019-12-12 | End: 2019-12-12 | Stop reason: HOSPADM

## 2019-12-12 RX ORDER — PHENYLEPHRINE HYDROCHLORIDE 25 MG/ML
1 SOLUTION/ DROPS OPHTHALMIC
Status: COMPLETED | OUTPATIENT
Start: 2019-12-12 | End: 2019-12-12

## 2019-12-12 RX ORDER — MIDAZOLAM HYDROCHLORIDE 1 MG/ML
INJECTION, SOLUTION INTRAMUSCULAR; INTRAVENOUS
Status: DISCONTINUED | OUTPATIENT
Start: 2019-12-12 | End: 2019-12-12

## 2019-12-12 RX ORDER — MOXIFLOXACIN 5 MG/ML
SOLUTION/ DROPS OPHTHALMIC
Status: DISCONTINUED | OUTPATIENT
Start: 2019-12-12 | End: 2019-12-12 | Stop reason: HOSPADM

## 2019-12-12 RX ORDER — TROPICAMIDE 10 MG/ML
1 SOLUTION/ DROPS OPHTHALMIC
Status: COMPLETED | OUTPATIENT
Start: 2019-12-12 | End: 2019-12-12

## 2019-12-12 RX ORDER — EPINEPHRINE 1 MG/ML
INJECTION INTRAMUSCULAR; INTRAVENOUS; SUBCUTANEOUS
Status: DISCONTINUED | OUTPATIENT
Start: 2019-12-12 | End: 2019-12-12 | Stop reason: HOSPADM

## 2019-12-12 RX ORDER — FENTANYL CITRATE 50 UG/ML
INJECTION, SOLUTION INTRAMUSCULAR; INTRAVENOUS
Status: DISCONTINUED | OUTPATIENT
Start: 2019-12-12 | End: 2019-12-12

## 2019-12-12 RX ORDER — ONDANSETRON 2 MG/ML
INJECTION INTRAMUSCULAR; INTRAVENOUS
Status: DISCONTINUED | OUTPATIENT
Start: 2019-12-12 | End: 2019-12-12

## 2019-12-12 RX ORDER — ACETAMINOPHEN 325 MG/1
650 TABLET ORAL EVERY 4 HOURS PRN
Qty: 8 TABLET | Refills: 0 | Status: SHIPPED | OUTPATIENT
Start: 2019-12-12 | End: 2019-12-16

## 2019-12-12 RX ORDER — PROPARACAINE HYDROCHLORIDE 5 MG/ML
1 SOLUTION/ DROPS OPHTHALMIC
Status: COMPLETED | OUTPATIENT
Start: 2019-12-12 | End: 2019-12-12

## 2019-12-12 RX ORDER — LIDOCAINE HYDROCHLORIDE 10 MG/ML
INJECTION INFILTRATION; PERINEURAL
Status: DISCONTINUED | OUTPATIENT
Start: 2019-12-12 | End: 2019-12-12 | Stop reason: HOSPADM

## 2019-12-12 RX ORDER — LIDOCAINE HYDROCHLORIDE 40 MG/ML
INJECTION, SOLUTION RETROBULBAR
Status: DISCONTINUED | OUTPATIENT
Start: 2019-12-12 | End: 2019-12-12

## 2019-12-12 RX ORDER — PHENYLEPHRINE HYDROCHLORIDE 100 MG/ML
1 SOLUTION/ DROPS OPHTHALMIC
Status: COMPLETED | OUTPATIENT
Start: 2019-12-12 | End: 2019-12-12

## 2019-12-12 RX ADMIN — FENTANYL CITRATE 25 MCG: 50 INJECTION, SOLUTION INTRAMUSCULAR; INTRAVENOUS at 07:12

## 2019-12-12 RX ADMIN — MIDAZOLAM HYDROCHLORIDE 1 MG: 1 INJECTION, SOLUTION INTRAMUSCULAR; INTRAVENOUS at 07:12

## 2019-12-12 RX ADMIN — PHENYLEPHRINE HYDROCHLORIDE 1 DROP: 25 SOLUTION/ DROPS OPHTHALMIC at 06:12

## 2019-12-12 RX ADMIN — SODIUM CHLORIDE, SODIUM LACTATE, POTASSIUM CHLORIDE, AND CALCIUM CHLORIDE: .6; .31; .03; .02 INJECTION, SOLUTION INTRAVENOUS at 06:12

## 2019-12-12 RX ADMIN — TROPICAMIDE 1 DROP: 10 SOLUTION/ DROPS OPHTHALMIC at 06:12

## 2019-12-12 RX ADMIN — PROPARACAINE HYDROCHLORIDE 1 DROP: 5 SOLUTION/ DROPS OPHTHALMIC at 06:12

## 2019-12-12 RX ADMIN — PHENYLEPHRINE HYDROCHLORIDE 1 DROP: 100 SOLUTION/ DROPS OPHTHALMIC at 06:12

## 2019-12-12 RX ADMIN — LIDOCAINE HYDROCHLORIDE 4 DROP: 40 SOLUTION RETROBULBAR; TOPICAL at 07:12

## 2019-12-12 RX ADMIN — ONDANSETRON 4 MG: 2 INJECTION, SOLUTION INTRAMUSCULAR; INTRAVENOUS at 07:12

## 2019-12-12 NOTE — ANESTHESIA PREPROCEDURE EVALUATION
12/12/2019  Carroll Roe is a 54 y.o., male.    Anesthesia Evaluation    I have reviewed the Patient Summary Reports.    I have reviewed the Nursing Notes.   I have reviewed the Medications.     Review of Systems  Anesthesia Hx:  No problems with previous Anesthesia    Social:  Non-Smoker    Cardiovascular:   Hypertension, well controlled hyperlipidemia    Pulmonary:  Pulmonary Normal    Renal/:  Renal/ Normal     Musculoskeletal:   Arthritis     Neurological:  Neurology Normal    Endocrine:  Endocrine Normal        Physical Exam  General:  Well nourished, Morbid Obesity    Airway/Jaw/Neck:  Airway Findings: Mouth Opening: Normal Tongue: Normal  General Airway Assessment: Adult  Oropharynx Findings:  Mallampati: II  Jaw/Neck Findings:  Neck ROM: Normal ROM     Eyes/Ears/Nose:  Eyes/Ears/Nose Findings:    Dental:  Dental Findings:   Chest/Lungs:  Chest/Lungs Findings: Normal Respiratory Rate     Heart/Vascular:  Heart Findings: Rate: Normal  Rhythm: Regular Rhythm        Mental Status:  Mental Status Findings:  Cooperative, Alert and Oriented         Anesthesia Plan  Type of Anesthesia, risks & benefits discussed:  Anesthesia Type:  MAC  Patient's Preference:   Intra-op Monitoring Plan: standard ASA monitors  Intra-op Monitoring Plan Comments:   Post Op Pain Control Plan: multimodal analgesia  Post Op Pain Control Plan Comments:   Induction:   IV  Beta Blocker:  Patient is on a Beta-Blocker and has received one dose within the past 24 hours (No further documentation required).       Informed Consent: Patient understands risks and agrees with Anesthesia plan.  Questions answered. Anesthesia consent signed with patient.  ASA Score: 2     Day of Surgery Review of History & Physical:  There are no significant changes.   H&P completed by Anesthesiologist.       Ready For Surgery From Anesthesia Perspective.

## 2019-12-12 NOTE — OR NURSING
"I observed the scrub tech tighten the cannula to the syringe and perform the initial "squirt" to ensure tightness.   "

## 2019-12-12 NOTE — TRANSFER OF CARE
"Anesthesia Transfer of Care Note    Patient: Carroll Roe    Procedure(s) Performed: Procedure(s) (LRB):  PHACOEMULSIFICATION, CATARACT (Right)    Patient location: PACU    Anesthesia Type: MAC    Transport from OR: Transported from OR on room air with adequate spontaneous ventilation    Post pain: adequate analgesia    Post assessment: no apparent anesthetic complications and tolerated procedure well    Post vital signs: stable    Level of consciousness: sedated, awake, alert and oriented    Nausea/Vomiting: no nausea/vomiting    Complications: none    Transfer of care protocol was followed      Last vitals:   Visit Vitals  BP (!) 152/79   Pulse 68   Temp 36.4 °C (97.5 °F) (Skin)   Resp 18   Ht 5' 9" (1.753 m)   Wt (!) 138.8 kg (306 lb)   SpO2 96%   BMI 45.19 kg/m²     "

## 2019-12-12 NOTE — ANESTHESIA POSTPROCEDURE EVALUATION
Anesthesia Post Evaluation    Patient: Carroll Roe    Procedure(s) Performed: Procedure(s) (LRB):  PHACOEMULSIFICATION, CATARACT (Right)    Final Anesthesia Type: MAC    Patient location during evaluation: PACU  Patient participation: Yes- Able to Participate  Level of consciousness: awake and alert and oriented  Post-procedure vital signs: reviewed and stable  Pain management: adequate  Airway patency: patent    PONV status at discharge: No PONV  Anesthetic complications: no      Cardiovascular status: blood pressure returned to baseline and stable  Respiratory status: unassisted and spontaneous ventilation  Hydration status: euvolemic  Follow-up not needed.          Vitals Value Taken Time   /69 12/12/2019  7:58 AM   Temp 36.4 °C (97.5 °F) 12/12/2019  7:48 AM   Pulse 70 12/12/2019  7:58 AM   Resp 14 12/12/2019  7:58 AM   SpO2 95 % 12/12/2019  7:58 AM         Event Time     Out of Recovery 08:10:00          Pain/Jayden Score: Jayden Score: 10 (12/12/2019  8:10 AM)

## 2019-12-12 NOTE — OP NOTE
Date of surgery: 12/12/2019    Operative Report    Preoperative Diagnosis: Nuclear sclerosis, right eye    Postoperative Diagnosis: Nuclear sclerosis, right eye    Procedure: Phacoemulsification with posterior chamber intraocular lens, right eye    Surgeon: Brett Lopez Jr. M.D.    Anesthesia: MAC with topical     Brief Preoperative Note:  The patient was seen in the office with cataract of the right eye.  Because of the impairment of the patient's reading, driving, ambulation, and other activities of daily living needing a good quality of vision, the patient elected to remove the cataract and place a acrylic lens implant, if possible    Procedure in detail:    Informed consent was obtained. Indications, risks and alternatives to the procedure were explained to the patient. The patient was given the opportunity to ask questions and consented to the procedure in writing. In the preoperative holding area, the patients identity and operative site were confirmed.  Topical anesthetic was administered, consisting of alternating 0.5% Proparacaine and 4% preservative-free Lidocaine Q 5 minutes times 3.  The patient was taken to the operative suite.  A time-out was performed.  The right eye was prepped with 5% Betadine and draped in sterile fashion.  A lid speculum was placed in the right eye.  The temporal clear corneal incision was developed using a LRI jose luis knife and crescent blade for a 3 plane incision.  A paracentesis was done with a 1mm jose luis blade.  Before instillation of the Viscoat, approximately 0.1 to 0.3cc of diluted preservative free intracameral lidocaine was instilled.  Viscoat was injected into the anterior chamber with a 27-gauge needle.  A 2.4mm jose luis blade was used to make a temporal clear corneal incision.  Afterwards, a cystotome was used to perform a continuous curvilinear capsulorrhexis with the assistance of utrata forceps.  Hydrodissection was performed using balanced salt  solution,injected under the anterior capsule using a orosco cannula and hydrodelineation was performed using a blunt-tipped cannula.  Next, phacoemulsification performed in a divide and conquer fashion with the use of a nucleus rotator to turn the lens and protect the posterior capsule.  Cortical material was then removed using irrigation and aspiration.  Healon was then injected into the anterior chamber and into capsular bag to inflate the bag for the placement of the lens implant  and then an Francis SN60WF 15.5 D lens was injected into the capsular bag and rotated in position with the assistance of a Sinsky hook.  Irrigation and aspiration were used to remove the remaining viscoelastic. Next, Miochol was injected into the anterior chamber and the pupil was allowed to constrict in a symmetrical fashion.  A collagen shield was placed into the eye and the eye was covered with a Badilol shield.  The patient tolerated the procedure well and was transferred to the recovery area in good condition.  Estimated blood loss:  none  Specimens:   none  Complications:  none  Disposition:   stable to PACU

## 2019-12-12 NOTE — BRIEF OP NOTE
Operative Note     SUMMARY     Surgery Date: 12/12/2019     Surgeon(s) and Role:     * Brett Lopez Jr., MD - Primary    Pre-op Diagnosis:  Age-related nuclear cataract of right eye [H25.11]    Post-op Diagnosis:  Age-related nuclear cataract of right eye [H25.11]    Procedure(s) (LRB):  PHACOEMULSIFICATION, CATARACT (Right)    Anesthesia: Monitor Anesthesia Care    Findings/Key Components:  Same as post op diagnosis    Estimated Blood Loss: * No values recorded between 12/12/2019  7:24 AM and 12/12/2019  7:54 AM *         Specimens (From admission, onward)    None            Discharge Note      SUMMARY     Admit Date: 12/12/2019    Attending Physician: Brett Lopez Jr., MD     Discharge Physician: Brett Lopez Jr., MD    Discharge Date: 12/12/2019     Final Diagnosis: Age-related nuclear cataract of right eye [H25.11]    Hospital Course: The patient was admitted for outpatient surgery and tolerated the procedure well. The patient was discharged home in stable condition the same day.    Diet: Advance as tolerated    Follow-Up: Follow up with Dr. Lopez, next day, as previously scheduled  Activity as tolerated.      Activity: Per Handout Instructions    Disposition: Home or self care    Patient Instructions:   Current Discharge Medication List      START taking these medications    Details   acetaminophen (TYLENOL) 325 MG tablet Take 2 tablets (650 mg total) by mouth every 4 (four) hours as needed for Pain.  Qty: 8 tablet, Refills: 0         CONTINUE these medications which have NOT CHANGED    Details   AFLURIA QD 2019-20,3YR UP,,PF, 60 mcg (15 mcg x 4)/0.5 mL Syrg ADM 0.5ML IM UTD  Refills: 0      allopurinol (ZYLOPRIM) 300 MG tablet Take 300 mg by mouth once daily.      atorvastatin (LIPITOR) 40 MG tablet ATORVASTATIN CALCIUM 40 MG TABS      augmented betamethasone (DIPROLENE) 0.05 % lotion APPLY 2 TO 3 DROPS TO AFFECTED EAR AS DIRECTED  Refills: 0      carvedilol (COREG) 12.5 MG tablet Take 12.5 mg by  mouth 2 (two) times daily.       diclofenac (VOLTAREN) 0.1 % ophthalmic solution Place 1 drop into the right eye 4 (four) times daily. Start drops 3 days before surgery  Qty: 5 mL, Refills: 3    Associated Diagnoses: Age-related nuclear cataract of right eye      hydrochlorothiazide (HYDRODIURIL) 25 MG tablet TAKE 1 TABLET EVERY MORNING  Qty: 90 tablet, Refills: 2      metFORMIN (GLUCOPHAGE-XR) 500 MG 24 hr tablet 2 (two) times daily with meals.   Refills: 3      moxifloxacin (VIGAMOX) 0.5 % ophthalmic solution Place 1 drop into the right eye 4 (four) times daily for 7 days  Qty: 3 mL, Refills: 1    Associated Diagnoses: Age-related nuclear cataract of right eye      prednisoLONE acetate (PRED FORTE) 1 % DrpS Place 1 drop into the right eye 4 (four) times daily.  Qty: 5 mL, Refills: 3    Associated Diagnoses: Age-related nuclear cataract of right eye      ramipril (ALTACE) 5 MG capsule RAMIPRIL 5 MG CAPS      aspirin 81 MG Chew Take 1 tablet (81 mg total) by mouth once daily.  Refills: 0             Discharge Procedure Orders (must include Diet, Follow-up, Activity)  No discharge procedures on file.

## 2019-12-12 NOTE — DISCHARGE INSTRUCTIONS
Eye Care     1. No bending or lifting.  2. Sit upright in bed or in recliner.  3. Do not remove shield.  4. Do not use drops in surgical eye.   5. If lens falls out of eye, do not attempt to replace it.  6. Follow up tomorrow in clinic.  7. Bring black bag with drops to clinic.            Discharge Instructions: After Your Surgery  Youve just had surgery. During surgery, you were given medicine called anesthesia to keep you relaxed and free of pain. After surgery, you may have some pain or nausea. This is common. Here are some tips for feeling better and getting well after surgery.     Stay on schedule with your medicine.   Going home  Your healthcare provider will show you how to take care of yourself when you go home. He or she will also answer your questions. Have an adult family member or friend drive you home. For the first 24 hours after your surgery:  · Do not drive or use heavy equipment.  · Do not make important decisions or sign legal papers.  · Do not drink alcohol.  · Have someone stay with you, if needed. He or she can watch for problems and help keep you safe.  Be sure to go to all follow-up visits with your healthcare provider. And rest after your surgery for as long as your healthcare provider tells you to.  Coping with pain  If you have pain after surgery, pain medicine will help you feel better. Take it as told, before pain becomes severe. Also, ask your healthcare provider or pharmacist about other ways to control pain. This might be with heat, ice, or relaxation. And follow any other instructions your surgeon or nurse gives you.  Tips for taking pain medicine  To get the best relief possible, remember these points:  · Pain medicines can upset your stomach. Taking them with a little food may help.  · Most pain relievers taken by mouth need at least 20 to 30 minutes to start to work.  · Taking medicine on a schedule can help you remember to take it. Try to time your medicine so that you can take  it before starting an activity. This might be before you get dressed, go for a walk, or sit down for dinner.  · Constipation is a common side effect of pain medicines. Call your healthcare provider before taking any medicines such as laxatives or stool softeners to help ease constipation. Also ask if you should skip any foods. Drinking lots of fluids and eating foods such as fruits and vegetables that are high in fiber can also help. Remember, do not take laxatives unless your surgeon has prescribed them.  · Drinking alcohol and taking pain medicine can cause dizziness and slow your breathing. It can even be deadly. Do not drink alcohol while taking pain medicine.  · Pain medicine can make you react more slowly to things. Do not drive or run machinery while taking pain medicine.  Your healthcare provider may tell you to take acetaminophen to help ease your pain. Ask him or her how much you are supposed to take each day. Acetaminophen or other pain relievers may interact with your prescription medicines or other over-the-counter (OTC) medicines. Some prescription medicines have acetaminophen and other ingredients. Using both prescription and OTC acetaminophen for pain can cause you to overdose. Read the labels on your OTC medicines with care. This will help you to clearly know the list of ingredients, how much to take, and any warnings. It may also help you not take too much acetaminophen. If you have questions or do not understand the information, ask your pharmacist or healthcare provider to explain it to you before you take the OTC medicine.  Managing nausea  Some people have an upset stomach after surgery. This is often because of anesthesia, pain, or pain medicine, or the stress of surgery. These tips will help you handle nausea and eat healthy foods as you get better. If you were on a special food plan before surgery, ask your healthcare provider if you should follow it while you get better. These tips may  help:  · Do not push yourself to eat. Your body will tell you when to eat and how much.  · Start off with clear liquids and soup. They are easier to digest.  · Next try semi-solid foods, such as mashed potatoes, applesauce, and gelatin, as you feel ready.  · Slowly move to solid foods. Dont eat fatty, rich, or spicy foods at first.  · Do not force yourself to have 3 large meals a day. Instead eat smaller amounts more often.  · Take pain medicines with a small amount of solid food, such as crackers or toast, to avoid nausea.     Call your surgeon if  · You still have pain an hour after taking medicine. The medicine may not be strong enough.  · You feel too sleepy, dizzy, or groggy. The medicine may be too strong.  · You have side effects like nausea, vomiting, or skin changes, such as rash, itching, or hives.       If you have obstructive sleep apnea  You were given anesthesia medicine during surgery to keep you comfortable and free of pain. After surgery, you may have more apnea spells because of this medicine and other medicines you were given. The spells may last longer than usual.   At home:  · Keep using the continuous positive airway pressure (CPAP) device when you sleep. Unless your healthcare provider tells you not to, use it when you sleep, day or night. CPAP is a common device used to treat obstructive sleep apnea.  · Talk with your provider before taking any pain medicine, muscle relaxants, or sedatives. Your provider will tell you about the possible dangers of taking these medicines.  Date Last Reviewed: 12/1/2016 © 2000-2017 [employer]. [address] [place] [address], [place] [address]. All rights reserved. This information is not intended as a substitute for professional medical care. Always follow your healthcare professional's instructions.

## 2019-12-13 ENCOUNTER — OFFICE VISIT (OUTPATIENT)
Dept: OPHTHALMOLOGY | Facility: CLINIC | Age: 54
End: 2019-12-13
Payer: COMMERCIAL

## 2019-12-13 DIAGNOSIS — Z98.41 STATUS POST CATARACT EXTRACTION AND INSERTION OF INTRAOCULAR LENS OF RIGHT EYE: Primary | ICD-10-CM

## 2019-12-13 DIAGNOSIS — Z96.1 STATUS POST CATARACT EXTRACTION AND INSERTION OF INTRAOCULAR LENS OF RIGHT EYE: Primary | ICD-10-CM

## 2019-12-13 PROCEDURE — 99999 PR PBB SHADOW E&M-EST. PATIENT-LVL II: CPT | Mod: PBBFAC,,, | Performed by: OPHTHALMOLOGY

## 2019-12-13 PROCEDURE — 99999 PR PBB SHADOW E&M-EST. PATIENT-LVL II: ICD-10-PCS | Mod: PBBFAC,,, | Performed by: OPHTHALMOLOGY

## 2019-12-13 PROCEDURE — 99024 PR POST-OP FOLLOW-UP VISIT: ICD-10-PCS | Mod: S$GLB,,, | Performed by: OPHTHALMOLOGY

## 2019-12-13 PROCEDURE — 99024 POSTOP FOLLOW-UP VISIT: CPT | Mod: S$GLB,,, | Performed by: OPHTHALMOLOGY

## 2019-12-13 NOTE — PROGRESS NOTES
HPI     Post-op Evaluation      Additional comments: 1 day s/p phaco iol OD 12/12              Comments     Pt states has headache and pain behind OD with some itching.     Gtts: Moxifloxacin, Prednisolone, Diclofenac QID OS - Did not bring drops   - told pt to starts drops when her gets home.           Last edited by Cheryle Quintana on 12/13/2019  9:51 AM. (History)        ROS     Negative for: Constitutional, Gastrointestinal, Neurological, Skin,   Genitourinary, Musculoskeletal, HENT, Endocrine, Cardiovascular, Eyes,   Respiratory, Psychiatric, Allergic/Imm, Heme/Lymph    Last edited by Brett Lopez Jr., MD on 12/13/2019 10:03 AM. (History)        Assessment /Plan     For exam results, see Encounter Report.    Status post cataract extraction and insertion of intraocular lens of right eye      Prednisolone acetate (pink or white top drop) 1 drop 4x daily Right eye; shake well before using drop  Vigamox 1 drop 4x daily right eye (tan top)  Diclofenac 1 drop 4x daily right eye (gray top)  No bending no lifting  Keep head elevated when laying down  Keep dry   F/u 1 week

## 2019-12-18 ENCOUNTER — OFFICE VISIT (OUTPATIENT)
Dept: OPHTHALMOLOGY | Facility: CLINIC | Age: 54
End: 2019-12-18
Payer: COMMERCIAL

## 2019-12-18 DIAGNOSIS — Z98.41 STATUS POST CATARACT EXTRACTION AND INSERTION OF INTRAOCULAR LENS OF RIGHT EYE: Primary | ICD-10-CM

## 2019-12-18 DIAGNOSIS — Z96.1 STATUS POST CATARACT EXTRACTION AND INSERTION OF INTRAOCULAR LENS OF RIGHT EYE: Primary | ICD-10-CM

## 2019-12-18 PROCEDURE — 99024 PR POST-OP FOLLOW-UP VISIT: ICD-10-PCS | Mod: S$GLB,,, | Performed by: OPHTHALMOLOGY

## 2019-12-18 PROCEDURE — 99999 PR PBB SHADOW E&M-EST. PATIENT-LVL II: ICD-10-PCS | Mod: PBBFAC,,, | Performed by: OPHTHALMOLOGY

## 2019-12-18 PROCEDURE — 99024 POSTOP FOLLOW-UP VISIT: CPT | Mod: S$GLB,,, | Performed by: OPHTHALMOLOGY

## 2019-12-18 PROCEDURE — 99999 PR PBB SHADOW E&M-EST. PATIENT-LVL II: CPT | Mod: PBBFAC,,, | Performed by: OPHTHALMOLOGY

## 2019-12-18 RX ORDER — METFORMIN HYDROCHLORIDE 1000 MG/1
1000 TABLET ORAL 2 TIMES DAILY WITH MEALS
Refills: 3 | COMMUNITY
Start: 2019-11-26 | End: 2021-02-18

## 2019-12-18 RX ORDER — DORZOLAMIDE HYDROCHLORIDE AND TIMOLOL MALEATE 20; 5 MG/ML; MG/ML
1 SOLUTION/ DROPS OPHTHALMIC 2 TIMES DAILY
Qty: 10 ML | Refills: 1 | Status: SHIPPED | OUTPATIENT
Start: 2019-12-18 | End: 2021-02-18

## 2019-12-18 NOTE — PROGRESS NOTES
HPI     Post-op Evaluation      Additional comments: 1 wk s/p phaco iol OD 12/12              Comments     Pt states doing well, vision better.      Gtts: Moxifloxacin, Prednisolone, Diclofenac QID OD            Last edited by Cheryle Quintana on 12/18/2019  3:31 PM. (History)        ROS     Negative for: Constitutional, Gastrointestinal, Neurological, Skin,   Genitourinary, Musculoskeletal, HENT, Endocrine, Cardiovascular, Eyes,   Respiratory, Psychiatric, Allergic/Imm, Heme/Lymph    Last edited by Brett Lopez Jr., MD on 12/18/2019  3:55 PM. (History)        Assessment /Plan     For exam results, see Encounter Report.    Status post cataract extraction and insertion of intraocular lens of right eye  -     dorzolamide-timolol 2-0.5% (COSOPT) 22.3-6.8 mg/mL ophthalmic solution; Place 1 drop into the right eye 2 (two) times daily.  Dispense: 10 mL; Refill: 1    Stop Moxifloxacin  Decrease PF and Diclofenac 1x daily right  Start cosopt 1 drop BID OU  Follow up in about 3 weeks (around 1/8/2020) for POW #1 visit cataract surgery.

## 2020-01-15 ENCOUNTER — OFFICE VISIT (OUTPATIENT)
Dept: OPHTHALMOLOGY | Facility: CLINIC | Age: 55
End: 2020-01-15
Payer: COMMERCIAL

## 2020-01-15 DIAGNOSIS — Z96.1 STATUS POST CATARACT EXTRACTION AND INSERTION OF INTRAOCULAR LENS OF RIGHT EYE: Primary | ICD-10-CM

## 2020-01-15 DIAGNOSIS — Z98.41 STATUS POST CATARACT EXTRACTION AND INSERTION OF INTRAOCULAR LENS OF RIGHT EYE: Primary | ICD-10-CM

## 2020-01-15 PROCEDURE — 99024 POSTOP FOLLOW-UP VISIT: CPT | Mod: S$GLB,,, | Performed by: OPHTHALMOLOGY

## 2020-01-15 PROCEDURE — 99999 PR PBB SHADOW E&M-EST. PATIENT-LVL II: CPT | Mod: PBBFAC,,, | Performed by: OPHTHALMOLOGY

## 2020-01-15 PROCEDURE — 99024 PR POST-OP FOLLOW-UP VISIT: ICD-10-PCS | Mod: S$GLB,,, | Performed by: OPHTHALMOLOGY

## 2020-01-15 PROCEDURE — 99999 PR PBB SHADOW E&M-EST. PATIENT-LVL II: ICD-10-PCS | Mod: PBBFAC,,, | Performed by: OPHTHALMOLOGY

## 2020-01-15 NOTE — PROGRESS NOTES
HPI     Post-op Evaluation      Additional comments: 1 month s/p phaco iol OD 12/12              Comments     Pt states seeing better since surgery. Still sees a little hump on lines   or straight surfaces.  Still using PF and diclofenac but told pt to   stopped using.    Gtts: Dorzolamide-timolol BID OD          Last edited by Cheryle Quintana on 1/15/2020  4:04 PM. (History)            Assessment /Plan     For exam results, see Encounter Report.    Status post cataract extraction and insertion of intraocular lens of right eye      DC drops  Satisfactory course, doing well  +PCO not visually significant at this point, will monitor  Rx for glasses given to patient  Follow up in about 6 months (around 7/15/2020) for f/u PSK OD.

## 2020-02-03 ENCOUNTER — OFFICE VISIT (OUTPATIENT)
Dept: OPHTHALMOLOGY | Facility: CLINIC | Age: 55
End: 2020-02-03
Payer: COMMERCIAL

## 2020-02-03 DIAGNOSIS — H33.21 RETINAL DETACHMENT, RIGHT: ICD-10-CM

## 2020-02-03 DIAGNOSIS — H33.001 MACULA-OFF RHEGMATOGENOUS RETINAL DETACHMENT, RIGHT: Primary | ICD-10-CM

## 2020-02-03 DIAGNOSIS — H43.11 VITREOUS HEMORRHAGE OF RIGHT EYE: ICD-10-CM

## 2020-02-03 PROCEDURE — 99999 PR PBB SHADOW E&M-EST. PATIENT-LVL III: CPT | Mod: PBBFAC,,, | Performed by: OPHTHALMOLOGY

## 2020-02-03 PROCEDURE — 92134 POSTERIOR SEGMENT OCT RETINA (OCULAR COHERENCE TOMOGRAPHY)-BOTH EYES: ICD-10-PCS | Mod: S$GLB,,, | Performed by: OPHTHALMOLOGY

## 2020-02-03 PROCEDURE — 99999 PR PBB SHADOW E&M-EST. PATIENT-LVL III: ICD-10-PCS | Mod: PBBFAC,,, | Performed by: OPHTHALMOLOGY

## 2020-02-03 PROCEDURE — 92014 COMPRE OPH EXAM EST PT 1/>: CPT | Mod: S$GLB,,, | Performed by: OPHTHALMOLOGY

## 2020-02-03 PROCEDURE — 92202 PR OPHTHALMOSCOPY, EXT, W/DRAW OPTIC NERVE/MACULA, I&R, UNI/BI: ICD-10-PCS | Mod: S$GLB,,, | Performed by: OPHTHALMOLOGY

## 2020-02-03 PROCEDURE — 92014 PR EYE EXAM, EST PATIENT,COMPREHESV: ICD-10-PCS | Mod: S$GLB,,, | Performed by: OPHTHALMOLOGY

## 2020-02-03 PROCEDURE — 92134 CPTRZ OPH DX IMG PST SGM RTA: CPT | Mod: S$GLB,,, | Performed by: OPHTHALMOLOGY

## 2020-02-03 PROCEDURE — 92202 OPSCPY EXTND ON/MAC DRAW: CPT | Mod: S$GLB,,, | Performed by: OPHTHALMOLOGY

## 2020-02-03 NOTE — PROGRESS NOTES
HPI     4 mo OCT  DLS: 10/14/2019 Dr. Pickard    Cataract sx w/ Dr. Lopez 12/12 OD doing well received new glasses rx   and seeing very well   Denies pain     (-)Flashes (-)Floaters  (-)Photophobia  (-)Glare    No gtts          OCT - OD - trace SRF inferior - much improved  OS - No ME      A/P    1. Hemorrhagic PVD OD  2. Small Flap tear at 1:30 OD  S/p laser retinopexy 2/25/19    3. RRD OD  Macula involving x 4-5 days    s/p 25g PPV/EL/AFx/SF6 OD for RRD OD 4/24/19    IOP improved after steroid taper    6/10/19 - recurrent inferior retinal detachment with low grade PVR     s/p repeat 25g repeat PPV/membrane stripping/EL/C3F8 ODfor RRD with PVR OD 6/12/19    Doing well, IOP better now off gtts    4.  PCIOL OU      12 months OCT

## 2021-01-15 ENCOUNTER — LAB VISIT (OUTPATIENT)
Dept: PRIMARY CARE CLINIC | Facility: OTHER | Age: 56
End: 2021-01-15
Attending: INTERNAL MEDICINE
Payer: COMMERCIAL

## 2021-01-15 DIAGNOSIS — Z20.822 ENCOUNTER FOR LABORATORY TESTING FOR COVID-19 VIRUS: ICD-10-CM

## 2021-01-15 PROCEDURE — U0003 INFECTIOUS AGENT DETECTION BY NUCLEIC ACID (DNA OR RNA); SEVERE ACUTE RESPIRATORY SYNDROME CORONAVIRUS 2 (SARS-COV-2) (CORONAVIRUS DISEASE [COVID-19]), AMPLIFIED PROBE TECHNIQUE, MAKING USE OF HIGH THROUGHPUT TECHNOLOGIES AS DESCRIBED BY CMS-2020-01-R: HCPCS

## 2021-01-16 LAB — SARS-COV-2 RNA RESP QL NAA+PROBE: NOT DETECTED

## 2021-01-28 ENCOUNTER — CLINICAL SUPPORT (OUTPATIENT)
Dept: OTHER | Facility: CLINIC | Age: 56
End: 2021-01-28
Payer: COMMERCIAL

## 2021-01-28 DIAGNOSIS — Z00.8 ENCOUNTER FOR OTHER GENERAL EXAMINATION: ICD-10-CM

## 2021-01-29 VITALS — BODY MASS INDEX: 44.6 KG/M2 | HEIGHT: 69 IN

## 2021-01-29 LAB
HDLC SERPL-MCNC: 20 MG/DL
POC CHOLESTEROL, LDL (DOCK): 61 MG/DL
POC CHOLESTEROL, TOTAL: 104 MG/DL
POC GLUCOSE, FASTING: 232 MG/DL (ref 60–110)
TRIGL SERPL-MCNC: 115 MG/DL

## 2021-02-05 ENCOUNTER — TELEPHONE (OUTPATIENT)
Dept: OTHER | Facility: CLINIC | Age: 56
End: 2021-02-05

## 2021-04-22 PROBLEM — E11.9 CONTROLLED TYPE 2 DIABETES MELLITUS WITHOUT COMPLICATION, WITHOUT LONG-TERM CURRENT USE OF INSULIN: Status: ACTIVE | Noted: 2021-04-22

## 2021-04-22 PROBLEM — E78.6 ABNORMALLY LOW HIGH DENSITY LIPOPROTEIN (HDL) CHOLESTEROL WITH HYPERTRIGLYCERIDEMIA: Status: ACTIVE | Noted: 2021-04-22

## 2021-04-22 PROBLEM — Z00.00 WELL ADULT EXAM: Status: ACTIVE | Noted: 2021-04-22

## 2021-04-22 PROBLEM — E78.1 ABNORMALLY LOW HIGH DENSITY LIPOPROTEIN (HDL) CHOLESTEROL WITH HYPERTRIGLYCERIDEMIA: Status: ACTIVE | Noted: 2021-04-22

## 2021-04-23 ENCOUNTER — LAB VISIT (OUTPATIENT)
Dept: FAMILY MEDICINE | Facility: CLINIC | Age: 56
End: 2021-04-23
Payer: COMMERCIAL

## 2021-04-23 ENCOUNTER — OFFICE VISIT (OUTPATIENT)
Dept: URGENT CARE | Facility: CLINIC | Age: 56
End: 2021-04-23
Payer: COMMERCIAL

## 2021-04-23 ENCOUNTER — TELEPHONE (OUTPATIENT)
Dept: FAMILY MEDICINE | Facility: CLINIC | Age: 56
End: 2021-04-23

## 2021-04-23 VITALS
OXYGEN SATURATION: 100 % | BODY MASS INDEX: 43.69 KG/M2 | WEIGHT: 295 LBS | RESPIRATION RATE: 17 BRPM | DIASTOLIC BLOOD PRESSURE: 96 MMHG | HEIGHT: 69 IN | SYSTOLIC BLOOD PRESSURE: 159 MMHG | TEMPERATURE: 99 F | HEART RATE: 83 BPM

## 2021-04-23 DIAGNOSIS — R53.81 MALAISE AND FATIGUE: ICD-10-CM

## 2021-04-23 DIAGNOSIS — J32.9 RHINOSINUSITIS: Primary | ICD-10-CM

## 2021-04-23 DIAGNOSIS — R50.9 FEVER: ICD-10-CM

## 2021-04-23 DIAGNOSIS — R05.9 COUGH: ICD-10-CM

## 2021-04-23 DIAGNOSIS — M79.10 MUSCLE PAIN: ICD-10-CM

## 2021-04-23 DIAGNOSIS — R53.83 MALAISE AND FATIGUE: ICD-10-CM

## 2021-04-23 DIAGNOSIS — R09.81 SINUS CONGESTION: ICD-10-CM

## 2021-04-23 DIAGNOSIS — J02.9 SORE THROAT: ICD-10-CM

## 2021-04-23 PROCEDURE — 3008F BODY MASS INDEX DOCD: CPT | Mod: CPTII,S$GLB,, | Performed by: PHYSICIAN ASSISTANT

## 2021-04-23 PROCEDURE — 99214 OFFICE O/P EST MOD 30 MIN: CPT | Mod: S$GLB,,, | Performed by: PHYSICIAN ASSISTANT

## 2021-04-23 PROCEDURE — U0003 INFECTIOUS AGENT DETECTION BY NUCLEIC ACID (DNA OR RNA); SEVERE ACUTE RESPIRATORY SYNDROME CORONAVIRUS 2 (SARS-COV-2) (CORONAVIRUS DISEASE [COVID-19]), AMPLIFIED PROBE TECHNIQUE, MAKING USE OF HIGH THROUGHPUT TECHNOLOGIES AS DESCRIBED BY CMS-2020-01-R: HCPCS | Performed by: FAMILY MEDICINE

## 2021-04-23 PROCEDURE — U0005 INFEC AGEN DETEC AMPLI PROBE: HCPCS | Performed by: FAMILY MEDICINE

## 2021-04-23 PROCEDURE — 99214 PR OFFICE/OUTPT VISIT, EST, LEVL IV, 30-39 MIN: ICD-10-PCS | Mod: S$GLB,,, | Performed by: PHYSICIAN ASSISTANT

## 2021-04-23 PROCEDURE — 3008F PR BODY MASS INDEX (BMI) DOCUMENTED: ICD-10-PCS | Mod: CPTII,S$GLB,, | Performed by: PHYSICIAN ASSISTANT

## 2021-04-23 RX ORDER — AMOXICILLIN AND CLAVULANATE POTASSIUM 875; 125 MG/1; MG/1
1 TABLET, FILM COATED ORAL 2 TIMES DAILY
Qty: 20 TABLET | Refills: 0 | Status: SHIPPED | OUTPATIENT
Start: 2021-04-23 | End: 2021-05-03

## 2021-04-24 LAB — SARS-COV-2 RNA RESP QL NAA+PROBE: NOT DETECTED

## 2021-04-29 DIAGNOSIS — M25.561 RIGHT KNEE PAIN, UNSPECIFIED CHRONICITY: Primary | ICD-10-CM

## 2021-04-30 ENCOUNTER — HOSPITAL ENCOUNTER (OUTPATIENT)
Dept: RADIOLOGY | Facility: HOSPITAL | Age: 56
Discharge: HOME | End: 2021-04-30
Attending: ORTHOPAEDIC SURGERY
Payer: COMMERCIAL

## 2021-04-30 ENCOUNTER — OFFICE VISIT (OUTPATIENT)
Dept: ORTHOPEDICS | Facility: CLINIC | Age: 56
End: 2021-04-30
Payer: COMMERCIAL

## 2021-04-30 VITALS — WEIGHT: 295 LBS | BODY MASS INDEX: 43.69 KG/M2 | HEIGHT: 69 IN

## 2021-04-30 DIAGNOSIS — M25.561 RIGHT KNEE PAIN, UNSPECIFIED CHRONICITY: ICD-10-CM

## 2021-04-30 DIAGNOSIS — M25.561 RIGHT KNEE PAIN, UNSPECIFIED CHRONICITY: Primary | ICD-10-CM

## 2021-04-30 PROCEDURE — 73560 XR KNEE ORTHO RIGHT: ICD-10-PCS | Mod: 26,LT,, | Performed by: RADIOLOGY

## 2021-04-30 PROCEDURE — 99999 PR PBB SHADOW E&M-EST. PATIENT-LVL III: ICD-10-PCS | Mod: PBBFAC,,, | Performed by: ORTHOPAEDIC SURGERY

## 2021-04-30 PROCEDURE — 99203 PR OFFICE/OUTPT VISIT, NEW, LEVL III, 30-44 MIN: ICD-10-PCS | Mod: S$GLB,,, | Performed by: ORTHOPAEDIC SURGERY

## 2021-04-30 PROCEDURE — 1125F AMNT PAIN NOTED PAIN PRSNT: CPT | Mod: S$GLB,,, | Performed by: ORTHOPAEDIC SURGERY

## 2021-04-30 PROCEDURE — 73560 X-RAY EXAM OF KNEE 1 OR 2: CPT | Mod: 26,LT,, | Performed by: RADIOLOGY

## 2021-04-30 PROCEDURE — 73562 X-RAY EXAM OF KNEE 3: CPT | Mod: 26,RT,, | Performed by: RADIOLOGY

## 2021-04-30 PROCEDURE — 73562 XR KNEE ORTHO RIGHT: ICD-10-PCS | Mod: 26,RT,, | Performed by: RADIOLOGY

## 2021-04-30 PROCEDURE — 3008F PR BODY MASS INDEX (BMI) DOCUMENTED: ICD-10-PCS | Mod: CPTII,S$GLB,, | Performed by: ORTHOPAEDIC SURGERY

## 2021-04-30 PROCEDURE — 1125F PR PAIN SEVERITY QUANTIFIED, PAIN PRESENT: ICD-10-PCS | Mod: S$GLB,,, | Performed by: ORTHOPAEDIC SURGERY

## 2021-04-30 PROCEDURE — 99203 OFFICE O/P NEW LOW 30 MIN: CPT | Mod: S$GLB,,, | Performed by: ORTHOPAEDIC SURGERY

## 2021-04-30 PROCEDURE — 99999 PR PBB SHADOW E&M-EST. PATIENT-LVL III: CPT | Mod: PBBFAC,,, | Performed by: ORTHOPAEDIC SURGERY

## 2021-04-30 PROCEDURE — 73560 X-RAY EXAM OF KNEE 1 OR 2: CPT | Mod: TC,PO,LT

## 2021-04-30 PROCEDURE — 3008F BODY MASS INDEX DOCD: CPT | Mod: CPTII,S$GLB,, | Performed by: ORTHOPAEDIC SURGERY

## 2021-07-26 PROBLEM — Z00.00 WELL ADULT EXAM: Status: RESOLVED | Noted: 2021-04-22 | Resolved: 2021-07-26

## 2022-01-14 ENCOUNTER — TELEPHONE (OUTPATIENT)
Dept: OPTOMETRY | Facility: CLINIC | Age: 57
End: 2022-01-14
Payer: COMMERCIAL

## 2022-01-19 ENCOUNTER — OFFICE VISIT (OUTPATIENT)
Dept: OPHTHALMOLOGY | Facility: CLINIC | Age: 57
End: 2022-01-19
Payer: COMMERCIAL

## 2022-01-19 DIAGNOSIS — H26.491 PCO (POSTERIOR CAPSULAR OPACIFICATION), RIGHT: Primary | ICD-10-CM

## 2022-01-19 PROCEDURE — 99999 PR PBB SHADOW E&M-EST. PATIENT-LVL III: ICD-10-PCS | Mod: PBBFAC,,, | Performed by: OPHTHALMOLOGY

## 2022-01-19 PROCEDURE — 1160F PR REVIEW ALL MEDS BY PRESCRIBER/CLIN PHARMACIST DOCUMENTED: ICD-10-PCS | Mod: CPTII,S$GLB,, | Performed by: OPHTHALMOLOGY

## 2022-01-19 PROCEDURE — 99214 OFFICE O/P EST MOD 30 MIN: CPT | Mod: S$GLB,,, | Performed by: OPHTHALMOLOGY

## 2022-01-19 PROCEDURE — 99999 PR PBB SHADOW E&M-EST. PATIENT-LVL III: CPT | Mod: PBBFAC,,, | Performed by: OPHTHALMOLOGY

## 2022-01-19 PROCEDURE — 99214 PR OFFICE/OUTPT VISIT, EST, LEVL IV, 30-39 MIN: ICD-10-PCS | Mod: S$GLB,,, | Performed by: OPHTHALMOLOGY

## 2022-01-19 PROCEDURE — 1159F PR MEDICATION LIST DOCUMENTED IN MEDICAL RECORD: ICD-10-PCS | Mod: CPTII,S$GLB,, | Performed by: OPHTHALMOLOGY

## 2022-01-19 PROCEDURE — 1160F RVW MEDS BY RX/DR IN RCRD: CPT | Mod: CPTII,S$GLB,, | Performed by: OPHTHALMOLOGY

## 2022-01-19 PROCEDURE — 1159F MED LIST DOCD IN RCRD: CPT | Mod: CPTII,S$GLB,, | Performed by: OPHTHALMOLOGY

## 2022-01-19 RX ORDER — VEDOLIZUMAB 300 MG/5ML
INJECTION, POWDER, LYOPHILIZED, FOR SOLUTION INTRAVENOUS
COMMUNITY
Start: 2021-12-07

## 2022-01-20 NOTE — PROGRESS NOTES
HPI     DLE- 01/15/20     Pt states he noticed OD va has decreased significantly over the past 2   weeks. Va is foggy and pain. +discharge out of OD no flashes or floaters.   Updated OCT mac today Hx of RD    Hemoglobin A1C       Date                     Value               Ref Range             Status                04/22/2021               7.1 (H)             0.0 - 5.6 %           Final                  Last edited by Fahad Saenz on 1/19/2022  2:50 PM. (History)        ROS     Negative for: Constitutional, Gastrointestinal, Neurological, Skin,   Genitourinary, Musculoskeletal, HENT, Endocrine, Cardiovascular, Eyes,   Respiratory, Psychiatric, Allergic/Imm, Heme/Lymph    Last edited by Brett Lopez Jr., MD on 1/19/2022  3:20 PM. (History)        Assessment /Plan     For exam results, see Encounter Report.    PCO (posterior capsular opacification), right     decrease vision OD  Schedule YAG OD  Follow up in about 1 month (around 2/19/2022) for followup YAG OD.

## 2022-01-31 ENCOUNTER — CLINICAL SUPPORT (OUTPATIENT)
Dept: OTHER | Facility: CLINIC | Age: 57
End: 2022-01-31

## 2022-01-31 DIAGNOSIS — Z00.8 ENCOUNTER FOR OTHER GENERAL EXAMINATION: ICD-10-CM

## 2022-02-01 VITALS — BODY MASS INDEX: 43.56 KG/M2 | HEIGHT: 69 IN

## 2022-02-01 LAB
HDLC SERPL-MCNC: 27 MG/DL
POC CHOLESTEROL, LDL (DOCK): 67 MG/DL
POC CHOLESTEROL, TOTAL: 124 MG/DL
POC GLUCOSE, FASTING: 173 MG/DL (ref 60–110)
TRIGL SERPL-MCNC: 147 MG/DL

## 2022-02-10 ENCOUNTER — PROCEDURE VISIT (OUTPATIENT)
Dept: OPHTHALMOLOGY | Facility: CLINIC | Age: 57
End: 2022-02-10
Payer: COMMERCIAL

## 2022-02-10 DIAGNOSIS — H26.491 PCO (POSTERIOR CAPSULAR OPACIFICATION), RIGHT: Primary | ICD-10-CM

## 2022-02-10 PROCEDURE — 99499 NO LOS: ICD-10-PCS | Mod: S$GLB,,, | Performed by: OPHTHALMOLOGY

## 2022-02-10 PROCEDURE — 99499 UNLISTED E&M SERVICE: CPT | Mod: S$GLB,,, | Performed by: OPHTHALMOLOGY

## 2022-02-10 PROCEDURE — 66821 YAG CAPSULOTOMY - OD - RIGHT EYE: ICD-10-PCS | Mod: RT,S$GLB,, | Performed by: OPHTHALMOLOGY

## 2022-02-10 PROCEDURE — 66821 AFTER CATARACT LASER SURGERY: CPT | Mod: RT,S$GLB,, | Performed by: OPHTHALMOLOGY

## 2022-02-10 RX ORDER — PREDNISOLONE ACETATE 10 MG/ML
1 SUSPENSION/ DROPS OPHTHALMIC 4 TIMES DAILY
Qty: 1 EACH | Refills: 1 | Status: SHIPPED | OUTPATIENT
Start: 2022-02-10 | End: 2022-02-15

## 2022-02-25 ENCOUNTER — OFFICE VISIT (OUTPATIENT)
Dept: OPHTHALMOLOGY | Facility: CLINIC | Age: 57
End: 2022-02-25
Payer: COMMERCIAL

## 2022-02-25 DIAGNOSIS — H26.491 PCO (POSTERIOR CAPSULAR OPACIFICATION), RIGHT: Primary | ICD-10-CM

## 2022-02-25 DIAGNOSIS — Z98.890 STATUS POST YAG CAPSULOTOMY OF RIGHT EYE: ICD-10-CM

## 2022-02-25 PROCEDURE — 99024 POSTOP FOLLOW-UP VISIT: CPT | Mod: S$GLB,,, | Performed by: OPHTHALMOLOGY

## 2022-02-25 PROCEDURE — 1160F RVW MEDS BY RX/DR IN RCRD: CPT | Mod: CPTII,S$GLB,, | Performed by: OPHTHALMOLOGY

## 2022-02-25 PROCEDURE — 99024 PR POST-OP FOLLOW-UP VISIT: ICD-10-PCS | Mod: S$GLB,,, | Performed by: OPHTHALMOLOGY

## 2022-02-25 PROCEDURE — 99999 PR PBB SHADOW E&M-EST. PATIENT-LVL III: CPT | Mod: PBBFAC,,, | Performed by: OPHTHALMOLOGY

## 2022-02-25 PROCEDURE — 99999 PR PBB SHADOW E&M-EST. PATIENT-LVL III: ICD-10-PCS | Mod: PBBFAC,,, | Performed by: OPHTHALMOLOGY

## 2022-02-25 PROCEDURE — 1160F PR REVIEW ALL MEDS BY PRESCRIBER/CLIN PHARMACIST DOCUMENTED: ICD-10-PCS | Mod: CPTII,S$GLB,, | Performed by: OPHTHALMOLOGY

## 2022-02-25 PROCEDURE — 1159F PR MEDICATION LIST DOCUMENTED IN MEDICAL RECORD: ICD-10-PCS | Mod: CPTII,S$GLB,, | Performed by: OPHTHALMOLOGY

## 2022-02-25 PROCEDURE — 1159F MED LIST DOCD IN RCRD: CPT | Mod: CPTII,S$GLB,, | Performed by: OPHTHALMOLOGY

## 2022-02-25 NOTE — PROGRESS NOTES
HPI     Post-op Evaluation      Additional comments: 2 wk s/p yag OD              Comments     Pt states no pain or irritation. No flashes or floaters. OD tearing some   today.     Gtts: Systane Complete BID OU          Last edited by Cheryle Quintana on 2/25/2022  2:24 PM. (History)        ROS     Negative for: Constitutional, Gastrointestinal, Neurological, Skin,   Genitourinary, Musculoskeletal, HENT, Endocrine, Cardiovascular, Eyes,   Respiratory, Psychiatric, Allergic/Imm, Heme/Lymph    Last edited by Brett Lopez Jr., MD on 2/25/2022  2:46 PM. (History)        Assessment /Plan     For exam results, see Encounter Report.    PCO (posterior capsular opacification), right    Status post YAG capsulotomy of right eye      Satisfactory course, doing well  OTC readers prn  Follow up in about 1 year (around 2/25/2023) for Annual.

## 2022-07-28 PROBLEM — K51.90 ULCERATIVE COLITIS WITHOUT COMPLICATIONS: Status: ACTIVE | Noted: 2022-07-28

## 2022-07-28 PROBLEM — Z00.00 ANNUAL PHYSICAL EXAM: Status: ACTIVE | Noted: 2022-07-28

## 2022-07-28 PROBLEM — Z79.899 ENCOUNTER FOR LONG-TERM (CURRENT) USE OF MEDICATIONS: Status: ACTIVE | Noted: 2022-07-28

## 2022-08-31 ENCOUNTER — TELEPHONE (OUTPATIENT)
Dept: SPINE | Facility: CLINIC | Age: 57
End: 2022-08-31
Payer: COMMERCIAL

## 2022-08-31 ENCOUNTER — TELEPHONE (OUTPATIENT)
Dept: PAIN MEDICINE | Facility: CLINIC | Age: 57
End: 2022-08-31
Payer: COMMERCIAL

## 2022-08-31 ENCOUNTER — OFFICE VISIT (OUTPATIENT)
Dept: PAIN MEDICINE | Facility: CLINIC | Age: 57
End: 2022-08-31
Payer: COMMERCIAL

## 2022-08-31 VITALS
DIASTOLIC BLOOD PRESSURE: 81 MMHG | WEIGHT: 300.69 LBS | HEIGHT: 69 IN | HEART RATE: 69 BPM | BODY MASS INDEX: 44.54 KG/M2 | SYSTOLIC BLOOD PRESSURE: 134 MMHG

## 2022-08-31 DIAGNOSIS — M54.50 ACUTE RIGHT-SIDED LOW BACK PAIN WITHOUT SCIATICA: Primary | ICD-10-CM

## 2022-08-31 PROCEDURE — 4010F PR ACE/ARB THEARPY RXD/TAKEN: ICD-10-PCS | Mod: CPTII,S$GLB,, | Performed by: PHYSICIAN ASSISTANT

## 2022-08-31 PROCEDURE — 99203 PR OFFICE/OUTPT VISIT, NEW, LEVL III, 30-44 MIN: ICD-10-PCS | Mod: S$GLB,,, | Performed by: PHYSICIAN ASSISTANT

## 2022-08-31 PROCEDURE — 1160F PR REVIEW ALL MEDS BY PRESCRIBER/CLIN PHARMACIST DOCUMENTED: ICD-10-PCS | Mod: CPTII,S$GLB,, | Performed by: PHYSICIAN ASSISTANT

## 2022-08-31 PROCEDURE — 99999 PR PBB SHADOW E&M-EST. PATIENT-LVL IV: CPT | Mod: PBBFAC,,, | Performed by: PHYSICIAN ASSISTANT

## 2022-08-31 PROCEDURE — 1160F RVW MEDS BY RX/DR IN RCRD: CPT | Mod: CPTII,S$GLB,, | Performed by: PHYSICIAN ASSISTANT

## 2022-08-31 PROCEDURE — 1159F PR MEDICATION LIST DOCUMENTED IN MEDICAL RECORD: ICD-10-PCS | Mod: CPTII,S$GLB,, | Performed by: PHYSICIAN ASSISTANT

## 2022-08-31 PROCEDURE — 3008F BODY MASS INDEX DOCD: CPT | Mod: CPTII,S$GLB,, | Performed by: PHYSICIAN ASSISTANT

## 2022-08-31 PROCEDURE — 1159F MED LIST DOCD IN RCRD: CPT | Mod: CPTII,S$GLB,, | Performed by: PHYSICIAN ASSISTANT

## 2022-08-31 PROCEDURE — 3075F SYST BP GE 130 - 139MM HG: CPT | Mod: CPTII,S$GLB,, | Performed by: PHYSICIAN ASSISTANT

## 2022-08-31 PROCEDURE — 3061F NEG MICROALBUMINURIA REV: CPT | Mod: CPTII,S$GLB,, | Performed by: PHYSICIAN ASSISTANT

## 2022-08-31 PROCEDURE — 3052F HG A1C>EQUAL 8.0%<EQUAL 9.0%: CPT | Mod: CPTII,S$GLB,, | Performed by: PHYSICIAN ASSISTANT

## 2022-08-31 PROCEDURE — 99203 OFFICE O/P NEW LOW 30 MIN: CPT | Mod: S$GLB,,, | Performed by: PHYSICIAN ASSISTANT

## 2022-08-31 PROCEDURE — 4010F ACE/ARB THERAPY RXD/TAKEN: CPT | Mod: CPTII,S$GLB,, | Performed by: PHYSICIAN ASSISTANT

## 2022-08-31 PROCEDURE — 3052F PR MOST RECENT HEMOGLOBIN A1C LEVEL 8.0 - < 9.0%: ICD-10-PCS | Mod: CPTII,S$GLB,, | Performed by: PHYSICIAN ASSISTANT

## 2022-08-31 PROCEDURE — 3008F PR BODY MASS INDEX (BMI) DOCUMENTED: ICD-10-PCS | Mod: CPTII,S$GLB,, | Performed by: PHYSICIAN ASSISTANT

## 2022-08-31 PROCEDURE — 3066F NEPHROPATHY DOC TX: CPT | Mod: CPTII,S$GLB,, | Performed by: PHYSICIAN ASSISTANT

## 2022-08-31 PROCEDURE — 3066F PR DOCUMENTATION OF TREATMENT FOR NEPHROPATHY: ICD-10-PCS | Mod: CPTII,S$GLB,, | Performed by: PHYSICIAN ASSISTANT

## 2022-08-31 PROCEDURE — 3075F PR MOST RECENT SYSTOLIC BLOOD PRESS GE 130-139MM HG: ICD-10-PCS | Mod: CPTII,S$GLB,, | Performed by: PHYSICIAN ASSISTANT

## 2022-08-31 PROCEDURE — 3079F DIAST BP 80-89 MM HG: CPT | Mod: CPTII,S$GLB,, | Performed by: PHYSICIAN ASSISTANT

## 2022-08-31 PROCEDURE — 99999 PR PBB SHADOW E&M-EST. PATIENT-LVL IV: ICD-10-PCS | Mod: PBBFAC,,, | Performed by: PHYSICIAN ASSISTANT

## 2022-08-31 PROCEDURE — 3061F PR NEG MICROALBUMINURIA RESULT DOCUMENTED/REVIEW: ICD-10-PCS | Mod: CPTII,S$GLB,, | Performed by: PHYSICIAN ASSISTANT

## 2022-08-31 PROCEDURE — 3079F PR MOST RECENT DIASTOLIC BLOOD PRESSURE 80-89 MM HG: ICD-10-PCS | Mod: CPTII,S$GLB,, | Performed by: PHYSICIAN ASSISTANT

## 2022-08-31 RX ORDER — MELOXICAM 15 MG/1
15 TABLET ORAL DAILY
Qty: 30 TABLET | Refills: 0 | Status: SHIPPED | OUTPATIENT
Start: 2022-08-31 | End: 2022-10-31

## 2022-08-31 RX ORDER — TIZANIDINE 4 MG/1
4 TABLET ORAL NIGHTLY PRN
Qty: 40 TABLET | Refills: 0 | Status: SHIPPED | OUTPATIENT
Start: 2022-08-31 | End: 2022-10-31

## 2022-08-31 NOTE — TELEPHONE ENCOUNTER
----- Message from Paulina Birch PA-C sent at 8/31/2022  3:35 PM CDT -----  Please let him know that Dr. Oglesby agreed to see him tomorrow at noon.  Her nurse will call him in the morning to confirm with him.  Thanks.

## 2022-08-31 NOTE — Clinical Note
Please let him know that Dr. Oglesby agreed to see him tomorrow at noon.  Her nurse will call him in the morning to confirm with him. Thanks.

## 2022-08-31 NOTE — TELEPHONE ENCOUNTER
I spoke with  Jyothi and let him know that Dr. Oglesby's nurse will call him in the morning to confirm his appointment with her for tomorrow, he indicated understanding.

## 2022-09-01 ENCOUNTER — OFFICE VISIT (OUTPATIENT)
Dept: PHYSICAL MEDICINE AND REHAB | Facility: CLINIC | Age: 57
End: 2022-09-01
Payer: COMMERCIAL

## 2022-09-01 VITALS
HEART RATE: 70 BPM | DIASTOLIC BLOOD PRESSURE: 86 MMHG | WEIGHT: 300.69 LBS | HEIGHT: 69 IN | BODY MASS INDEX: 44.54 KG/M2 | SYSTOLIC BLOOD PRESSURE: 140 MMHG

## 2022-09-01 DIAGNOSIS — M54.50 ACUTE RIGHT-SIDED LOW BACK PAIN WITHOUT SCIATICA: ICD-10-CM

## 2022-09-01 PROCEDURE — 20553 PR INJECT TRIGGER POINTS, > 3: ICD-10-PCS | Mod: S$GLB,,, | Performed by: PHYSICAL MEDICINE & REHABILITATION

## 2022-09-01 PROCEDURE — 3052F HG A1C>EQUAL 8.0%<EQUAL 9.0%: CPT | Mod: CPTII,S$GLB,, | Performed by: PHYSICAL MEDICINE & REHABILITATION

## 2022-09-01 PROCEDURE — 20553 NJX 1/MLT TRIGGER POINTS 3/>: CPT | Mod: S$GLB,,, | Performed by: PHYSICAL MEDICINE & REHABILITATION

## 2022-09-01 PROCEDURE — 3008F PR BODY MASS INDEX (BMI) DOCUMENTED: ICD-10-PCS | Mod: CPTII,S$GLB,, | Performed by: PHYSICAL MEDICINE & REHABILITATION

## 2022-09-01 PROCEDURE — 3077F PR MOST RECENT SYSTOLIC BLOOD PRESSURE >= 140 MM HG: ICD-10-PCS | Mod: CPTII,S$GLB,, | Performed by: PHYSICAL MEDICINE & REHABILITATION

## 2022-09-01 PROCEDURE — 3052F PR MOST RECENT HEMOGLOBIN A1C LEVEL 8.0 - < 9.0%: ICD-10-PCS | Mod: CPTII,S$GLB,, | Performed by: PHYSICAL MEDICINE & REHABILITATION

## 2022-09-01 PROCEDURE — 1159F MED LIST DOCD IN RCRD: CPT | Mod: CPTII,S$GLB,, | Performed by: PHYSICAL MEDICINE & REHABILITATION

## 2022-09-01 PROCEDURE — 3008F BODY MASS INDEX DOCD: CPT | Mod: CPTII,S$GLB,, | Performed by: PHYSICAL MEDICINE & REHABILITATION

## 2022-09-01 PROCEDURE — 3066F NEPHROPATHY DOC TX: CPT | Mod: CPTII,S$GLB,, | Performed by: PHYSICAL MEDICINE & REHABILITATION

## 2022-09-01 PROCEDURE — 3061F NEG MICROALBUMINURIA REV: CPT | Mod: CPTII,S$GLB,, | Performed by: PHYSICAL MEDICINE & REHABILITATION

## 2022-09-01 PROCEDURE — 3061F PR NEG MICROALBUMINURIA RESULT DOCUMENTED/REVIEW: ICD-10-PCS | Mod: CPTII,S$GLB,, | Performed by: PHYSICAL MEDICINE & REHABILITATION

## 2022-09-01 PROCEDURE — 99204 OFFICE O/P NEW MOD 45 MIN: CPT | Mod: 25,S$GLB,, | Performed by: PHYSICAL MEDICINE & REHABILITATION

## 2022-09-01 PROCEDURE — 3079F DIAST BP 80-89 MM HG: CPT | Mod: CPTII,S$GLB,, | Performed by: PHYSICAL MEDICINE & REHABILITATION

## 2022-09-01 PROCEDURE — 99999 PR PBB SHADOW E&M-EST. PATIENT-LVL III: ICD-10-PCS | Mod: PBBFAC,,, | Performed by: PHYSICAL MEDICINE & REHABILITATION

## 2022-09-01 PROCEDURE — 4010F ACE/ARB THERAPY RXD/TAKEN: CPT | Mod: CPTII,S$GLB,, | Performed by: PHYSICAL MEDICINE & REHABILITATION

## 2022-09-01 PROCEDURE — 3079F PR MOST RECENT DIASTOLIC BLOOD PRESSURE 80-89 MM HG: ICD-10-PCS | Mod: CPTII,S$GLB,, | Performed by: PHYSICAL MEDICINE & REHABILITATION

## 2022-09-01 PROCEDURE — 99204 PR OFFICE/OUTPT VISIT, NEW, LEVL IV, 45-59 MIN: ICD-10-PCS | Mod: 25,S$GLB,, | Performed by: PHYSICAL MEDICINE & REHABILITATION

## 2022-09-01 PROCEDURE — 3066F PR DOCUMENTATION OF TREATMENT FOR NEPHROPATHY: ICD-10-PCS | Mod: CPTII,S$GLB,, | Performed by: PHYSICAL MEDICINE & REHABILITATION

## 2022-09-01 PROCEDURE — 4010F PR ACE/ARB THEARPY RXD/TAKEN: ICD-10-PCS | Mod: CPTII,S$GLB,, | Performed by: PHYSICAL MEDICINE & REHABILITATION

## 2022-09-01 PROCEDURE — 3077F SYST BP >= 140 MM HG: CPT | Mod: CPTII,S$GLB,, | Performed by: PHYSICAL MEDICINE & REHABILITATION

## 2022-09-01 PROCEDURE — 99999 PR PBB SHADOW E&M-EST. PATIENT-LVL III: CPT | Mod: PBBFAC,,, | Performed by: PHYSICAL MEDICINE & REHABILITATION

## 2022-09-01 PROCEDURE — 1159F PR MEDICATION LIST DOCUMENTED IN MEDICAL RECORD: ICD-10-PCS | Mod: CPTII,S$GLB,, | Performed by: PHYSICAL MEDICINE & REHABILITATION

## 2022-09-01 NOTE — PROGRESS NOTES
HPI:  Patient is a 57 y.o. year old male w. Severe right low back pain. He states it occurs several times a year. When it happens he is unable to walk. The pain Is intense. He was evaluated by neurosurgery who diagnosed him w. A muscular etiology. He has had this for over 10 yrs. He denies an inciting event. It does not radiate. He does not have any neuro deficits. He denies trauma.  He was prescribed zanaflex but did not tolerate it  Imaging  None  Labs  EGFR cr lfts   gluc [id number] 8.5      Past Medical History:   Diagnosis Date    Cataract     OS    Chronic ulcerative enterocolitis with complication     Controlled type 2 diabetes mellitus without complication, without long-term current use of insulin 04/22/2021    Detached retina, right 02/2019    Gout     Hepatomegaly     Hyperlipidemia     Hypertension     Retinal detachment     Snoring     Ulcerative colitis      Past Surgical History:   Procedure Laterality Date    AIR FLUID EXCHANGE OF EYE  06/12/2019    Procedure: AIR FLUID EXCHANGE, EYE;  Surgeon: LESLYE Pickard MD;  Location: [facility] OR [address] [place] [address];  Service: Ophthalmology;;    CATARACT EXTRACTION W/  INTRAOCULAR LENS IMPLANT Right 12/12/2019    Dr Lopez    COLONOSCOPY  10/15/2020    Dr. Botello    COLONOSCOPY  08/19/2021    Dr. Botello    COLONOSCOPY  04/07/2022    Dr Botello    ENDOLASER PHOTOCOAGULATION  06/12/2019    Procedure: PHOTOCOAGULATION, ENDOLASER;  Surgeon: LESLYE Pickard MD;  Location: [facility] OR [address] [place] [address];  Service: Ophthalmology;;    EYE SURGERY      PHACOEMULSIFICATION OF CATARACT Right 12/12/2019    Procedure: PHACOEMULSIFICATION, CATARACT;  Surgeon: Brett Lopez Jr., MD;  Location: [facility] OR;  Service: Ophthalmology;  Laterality: Right;  Right    REMOVAL OF EPIRETINAL MEMBRANE  06/12/2019    Procedure: REMOVAL, EPIRETINAL MEMBRANE;  Surgeon: LESLYE Pickard MD;  Location: [facility] OR [address] [place] [address];  Service: Ophthalmology;;    RETINAL DETACHMENT SURGERY      SHOULDER SURGERY      right     VITRECTOMY BY PARS PLANA APPROACH Right 2019    Procedure: VITRECTOMY, PARS PLANA APPROACH;  Surgeon: LESLYE Pickard MD;  [facility];  Service: Ophthalmology;  Laterality: Right;  25g PPV/EL/Afx/SF6    VITRECTOMY BY PARS PLANA APPROACH Right 2019    Procedure: 25 g PPV MEMBRANE STRIPPING / EL / C3f8 OD poss oil for RRD with PVR OD;  Surgeon: LESLYE Pickard MD;  [facility];  Service: Ophthalmology;  Laterality: Right;  50 min    WRIST SURGERY      left x 3    Yag Capsulotomy Right 02/10/2022    Dr Lopez     Family History   Problem Relation Age of Onset    Cataracts Mother     Hyperlipidemia Mother     Macular degeneration Mother     Diabetes Father     Cataracts Sister     Macular degeneration Sister     Cataracts Brother     Cataracts Paternal Grandmother     Glaucoma Paternal Grandmother     Amblyopia Neg Hx     Blindness Neg Hx     Cancer Neg Hx     Hypertension Neg Hx     Retinal detachment Neg Hx     Strabismus Neg Hx     Stroke Neg Hx     Thyroid disease Neg Hx      Social History     Socioeconomic History    Marital status:    Tobacco Use    Smoking status: Former     Types: Cigarettes     Quit date:      Years since quittin.6    Smokeless tobacco: Former   Substance and Sexual Activity    Alcohol use: Yes     Comment: rare occasions    Drug use: Never       Review of patient's allergies indicates:   Allergen Reactions    Clindamycin Other (See Comments)     Dizziness      Hydromorphone      Other reaction(s): nausea    Rosuvastatin      Other reaction(s): insomnia, insomniaPT TOOK LIPITOR IN PAST AND STATES CAN TAKE.    Uloric [febuxostat]      Heart attack like symptoms       Current Outpatient Medications:     allopurinoL (ZYLOPRIM) 300 MG tablet, Take 1 tablet (300 mg total) by mouth once daily., Disp: 90 tablet, Rfl: 4    atorvastatin (LIPITOR) 40 MG tablet, Take 1 tablet (40 mg total) by mouth nightly., Disp: 90 tablet, Rfl:  4    carvediloL (COREG) 12.5 MG tablet, Take 1 tablet (12.5 mg total) by mouth 2 (two) times daily., Disp: 180 tablet, Rfl: 4    ENTYVIO 300 mg SolR injection, , Disp: , Rfl:     glimepiride (AMARYL) 4 MG tablet, Take 1 tablet (4 mg total) by mouth before breakfast., Disp: 90 tablet, Rfl: 4    hydroCHLOROthiazide (HYDRODIURIL) 25 MG tablet, Take 1 tablet (25 mg total) by mouth every morning., Disp: 90 tablet, Rfl: 4    JARDIANCE 25 mg tablet, Take 1 tablet (25 mg total) by mouth once daily., Disp: 90 tablet, Rfl: 4    meloxicam (MOBIC) 15 MG tablet, Take 1 tablet (15 mg total) by mouth once daily., Disp: 30 tablet, Rfl: 0    ramipriL (ALTACE) 10 MG capsule, Take 1 capsule (10 mg total) by mouth once daily., Disp: 90 capsule, Rfl: 4    tiZANidine (ZANAFLEX) 4 MG tablet, Take 1 tablet (4 mg total) by mouth nightly as needed (muscle spsams/ pain)., Disp: 40 tablet, Rfl: 0    aspirin 81 MG Chew, Take 1 tablet (81 mg total) by mouth once daily., Disp: , Rfl: 0    niacin 500 MG CpSR, Take 1 capsule (500 mg total) by mouth every evening. (Patient not taking: No sig reported), Disp: 90 capsule, Rfl: 0      Review of Systems:  No nausea, vomiting, fevers, chills , contipation, diarrhea or sweats,no weight change, back stiffness, no chest pain, no sob, no change of bowel or bladder habits,no coordination issues       Physical Exam:      Vitals:    09/01/22 1208   BP: (!) 140/86   Pulse: 70     alert and oriented ×4 follows commands answers all questions appropriately,affect wnl  Manual muscle test 5 out of 5 sensation to light touch grossly intact  +excruciate tenderness R QL and right thoracic paraspinals  Dec lumbar rom  Antalgic gait  -slR modified  babinsky down  No clonus  No C/C/E     Assessment:  QL syndrome  DM2- limit cortisone  Plan:  Tpi's to lumbar paraspinals today  Baclofen trial  RTC in 2 wks, he will likely need another set of tpi's. If he is not having response to this, will consider further imaging.      "  pROCEDURE NOTE:  Risk and benefit of trigger point injections given to pt. Injections performed w. A 1.5" 25G needle after sterile prep w. chlorohexedine, verbal consent obtained . NO complications. Right thoracic paraspinals, Quadratus Lumborum  AND ILIOCOSTALIS were inJected with a total of 3ML of 1% Lidocaine     Thank you for this interesting referral-note will be sent via Epic to referring provider (Paulina MOCTEZUMA   "

## 2022-09-08 NOTE — PROGRESS NOTES
Back and Spine New Patient    Patient ID: Carroll Roe is a 57 y.o. male.    Chief Complaint   Patient presents with    Back Pain     Has had back pain off & on for some time, this episode started 2 days ago, constant, low back that radiates up to under right shoulder blade.       Review of Systems   Constitutional:  Negative for activity change, chills, fatigue and unexpected weight change.   HENT:  Negative for hearing loss, tinnitus, trouble swallowing and voice change.    Eyes:  Negative for visual disturbance.   Respiratory:  Negative for apnea, chest tightness and shortness of breath.    Cardiovascular:  Negative for chest pain and palpitations.   Gastrointestinal:  Negative for abdominal pain, constipation, diarrhea, nausea and vomiting.   Genitourinary:  Negative for difficulty urinating, dysuria and frequency.   Musculoskeletal:  Positive for back pain and myalgias. Negative for gait problem, neck pain and neck stiffness.   Skin:  Negative for wound.   Neurological:  Negative for dizziness, tremors, seizures, facial asymmetry, speech difficulty, weakness, light-headedness, numbness and headaches.   Psychiatric/Behavioral:  Negative for confusion and decreased concentration.      Past Medical History:   Diagnosis Date    Cataract     OS    Chronic ulcerative enterocolitis with complication     Controlled type 2 diabetes mellitus without complication, without long-term current use of insulin 2021    Detached retina, right 2019    Gout     Hepatomegaly     Hyperlipidemia     Hypertension     Retinal detachment     Snoring     Ulcerative colitis      Social History     Socioeconomic History    Marital status:    Tobacco Use    Smoking status: Former     Types: Cigarettes     Quit date:      Years since quittin.6    Smokeless tobacco: Former   Substance and Sexual Activity    Alcohol use: Yes     Comment: rare occasions    Drug use: Never     Family History   Problem Relation Age of  Onset    Cataracts Mother     Hyperlipidemia Mother     Macular degeneration Mother     Diabetes Father     Cataracts Sister     Macular degeneration Sister     Cataracts Brother     Cataracts Paternal Grandmother     Glaucoma Paternal Grandmother     Amblyopia Neg Hx     Blindness Neg Hx     Cancer Neg Hx     Hypertension Neg Hx     Retinal detachment Neg Hx     Strabismus Neg Hx     Stroke Neg Hx     Thyroid disease Neg Hx      Review of patient's allergies indicates:   Allergen Reactions    Clindamycin Other (See Comments)     Dizziness      Hydromorphone      Other reaction(s): nausea    Rosuvastatin      Other reaction(s): insomnia, insomniaPT TOOK LIPITOR IN PAST AND STATES CAN TAKE.    Uloric [febuxostat]      Heart attack like symptoms       Current Outpatient Medications:     allopurinoL (ZYLOPRIM) 300 MG tablet, Take 1 tablet (300 mg total) by mouth once daily., Disp: 90 tablet, Rfl: 4    atorvastatin (LIPITOR) 40 MG tablet, Take 1 tablet (40 mg total) by mouth nightly., Disp: 90 tablet, Rfl: 4    carvediloL (COREG) 12.5 MG tablet, Take 1 tablet (12.5 mg total) by mouth 2 (two) times daily., Disp: 180 tablet, Rfl: 4    ENTYVIO 300 mg SolR injection, , Disp: , Rfl:     glimepiride (AMARYL) 4 MG tablet, Take 1 tablet (4 mg total) by mouth before breakfast., Disp: 90 tablet, Rfl: 4    hydroCHLOROthiazide (HYDRODIURIL) 25 MG tablet, Take 1 tablet (25 mg total) by mouth every morning., Disp: 90 tablet, Rfl: 4    JARDIANCE 25 mg tablet, Take 1 tablet (25 mg total) by mouth once daily., Disp: 90 tablet, Rfl: 4    ramipriL (ALTACE) 10 MG capsule, Take 1 capsule (10 mg total) by mouth once daily., Disp: 90 capsule, Rfl: 4    aspirin 81 MG Chew, Take 1 tablet (81 mg total) by mouth once daily., Disp: , Rfl: 0    meloxicam (MOBIC) 15 MG tablet, Take 1 tablet (15 mg total) by mouth once daily., Disp: 30 tablet, Rfl: 0    niacin 500 MG CpSR, Take 1 capsule (500 mg total) by mouth every evening. (Patient not taking: No  "sig reported), Disp: 90 capsule, Rfl: 0    tiZANidine (ZANAFLEX) 4 MG tablet, Take 1 tablet (4 mg total) by mouth nightly as needed (muscle spsams/ pain)., Disp: 40 tablet, Rfl: 0    Vitals:    08/31/22 1354   BP: 134/81   BP Location: Right arm   Patient Position: Sitting   BP Method: Large (Automatic)   Pulse: 69   Weight: (!) 136.4 kg (300 lb 11.3 oz)   Height: 5' 9" (1.753 m)       Physical Exam  Vitals and nursing note reviewed.   Constitutional:       Appearance: He is well-developed.   HENT:      Head: Normocephalic and atraumatic.   Eyes:      Pupils: Pupils are equal, round, and reactive to light.   Cardiovascular:      Rate and Rhythm: Normal rate.   Pulmonary:      Effort: Pulmonary effort is normal.   Abdominal:      General: There is no distension.   Musculoskeletal:         General: Normal range of motion.      Cervical back: Normal range of motion and neck supple.   Skin:     General: Skin is warm and dry.   Neurological:      Mental Status: He is alert and oriented to person, place, and time.      Coordination: Finger-Nose-Finger Test normal.      Gait: Gait is intact. Tandem walk normal.      Deep Tendon Reflexes:      Reflex Scores:       Tricep reflexes are 2+ on the right side and 2+ on the left side.       Bicep reflexes are 2+ on the right side and 2+ on the left side.       Brachioradialis reflexes are 2+ on the right side and 2+ on the left side.       Patellar reflexes are 2+ on the right side and 2+ on the left side.       Achilles reflexes are 2+ on the right side and 2+ on the left side.  Psychiatric:         Speech: Speech normal.         Behavior: Behavior normal.         Thought Content: Thought content normal.         Judgment: Judgment normal.       Neurologic Exam     Mental Status   Oriented to person, place, and time.   Oriented to person.   Oriented to place.   Oriented to time.   Attention: normal. Concentration: normal.   Speech: speech is normal   Level of consciousness: " alert  Knowledge: consistent with education.     Cranial Nerves     CN III, IV, VI   Pupils are equal, round, and reactive to light.    CN XI   Right sternocleidomastoid strength: normal  Left sternocleidomastoid strength: normal  Right trapezius strength: normal  Left trapezius strength: normal    Motor Exam   Muscle bulk: normal  Overall muscle tone: normal  Right arm tone: normal  Left arm tone: normal  Right arm pronator drift: absent  Left arm pronator drift: absent  Right leg tone: normal  Left leg tone: normal    Strength   Right neck flexion: 5/5  Left neck flexion: 5/5  Right neck extension: 5/5  Left neck extension: 5/5  Right deltoid: 5/5  Left deltoid: 5/5  Right biceps: 5/5  Left biceps: 5/5  Right triceps: 5/5  Left triceps: 5/5  Right wrist flexion: 5/5  Left wrist flexion: 5/5  Right wrist extension: 5/5  Left wrist extension: 5/5  Right interossei: 5/5  Left interossei: 5/5  Right abdominals: 5/5  Left abdominals: 5/5  Right iliopsoas: 5/5  Left iliopsoas: 5/5  Right quadriceps: 5/5  Left quadriceps: 5/5  Right hamstrin/5  Left hamstrin/5  Right glutei: 5/5  Left glutei: 5/5  Right anterior tibial: 5/5  Left anterior tibial: 5/5  Right posterior tibial: 5/5  Left posterior tibial: 5/5  Right peroneal: 5/5  Left peroneal: 5/5  Right gastroc: 5/5  Left gastroc: 5/5    Sensory Exam   Right arm light touch: normal  Left arm light touch: normal  Right leg light touch: normal  Left leg light touch: normal    Gait, Coordination, and Reflexes     Gait  Gait: normal    Coordination   Finger to nose coordination: normal  Tandem walking coordination: normal    Tremor   Resting tremor: absent  Intention tremor: absent  Action tremor: absent    Reflexes   Right brachioradialis: 2+  Left brachioradialis: 2+  Right biceps: 2+  Left biceps: 2+  Right triceps: 2+  Left triceps: 2+  Right patellar: 2+  Left patellar: 2+  Right achilles: 2+  Left achilles: 2+  Right Bedoya: absent  Left Bedoya: absent  Right  "ankle clonus: absent  Left ankle clonus: absent    Provider dictation:    57 year old male former smoker with diabetes, hyperlipidemia, hypertension presents to discuss back pain.  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   He describes intermittent lower back pain for about 10 years.  Current exacerbation started 2 days ago without triggering event.  Pain is felt from the right lower back into the right scapular region as a "nerve burning" pain and is very painful at times.  He denies any radicular leg pain, numbness, tingling.  Has tried ibuprofen and some chiropractic care about 2 years ago.  About 8 years ago he had injections, sounds like trigger point injections.      Current medications:  ibuprofen  Medications tried:  no others for pain  Physical therapy:  none  Chiropractic care:  about 2 years ago with no improvement  Interventional Procedures:  trigger point injections about 8 years ago     On exam, there is 5/5 strength with 2+ DTR and no sensory deficits.  Gait and station fluid.  Denies bowel/ bladder dysfunction.  Full range of motion of the upper and lower extremities. No pain with axial facet loading.  No SI joint pain on palpation.  No pain with lumbar flexion/ extension.    No imaging.    Mr. Hodges has chronic intermittent lower back pain with acute exacerbation right lumbar back pain to the right scapula.  Pain pattern and exam most consistent with myofascial/ mechanical pain.  We discussed medications, PT and further trigger point injections.  Will try mobic and zanaflex to help with pain/ spasms.  I am referring to PM&R for trigger point injections.  Follow up with me if no improvement.    Visit Diagnosis:  Acute right-sided low back pain without sciatica  -     meloxicam (MOBIC) 15 MG tablet; Take 1 tablet (15 mg total) by mouth once daily.  Dispense: 30 tablet; Refill: 0  -     tiZANidine (ZANAFLEX) 4 MG tablet; Take 1 tablet (4 mg total) by mouth nightly as " needed (muscle spsams/ pain).  Dispense: 40 tablet; Refill: 0  -     Ambulatory referral/consult to Physical Medicine Rehab; Future; Expected date: 09/07/2022      Total time spent counseling greater than fifty percent of total visit time.  Counseling included discussion regarding imaging findings, diagnosis possibilities, treatment options, risks and benefits.   The patient had many questions regarding the options and long-term effects.

## 2022-09-13 ENCOUNTER — OFFICE VISIT (OUTPATIENT)
Dept: PHYSICAL MEDICINE AND REHAB | Facility: CLINIC | Age: 57
End: 2022-09-13
Payer: COMMERCIAL

## 2022-09-13 VITALS
BODY MASS INDEX: 44.54 KG/M2 | HEART RATE: 67 BPM | DIASTOLIC BLOOD PRESSURE: 82 MMHG | WEIGHT: 300.69 LBS | HEIGHT: 69 IN | SYSTOLIC BLOOD PRESSURE: 158 MMHG

## 2022-09-13 DIAGNOSIS — G89.29 CHRONIC RIGHT-SIDED LOW BACK PAIN WITHOUT SCIATICA: Primary | ICD-10-CM

## 2022-09-13 DIAGNOSIS — M54.50 CHRONIC RIGHT-SIDED LOW BACK PAIN WITHOUT SCIATICA: Primary | ICD-10-CM

## 2022-09-13 DIAGNOSIS — M79.10 MYALGIA: ICD-10-CM

## 2022-09-13 PROCEDURE — 1159F PR MEDICATION LIST DOCUMENTED IN MEDICAL RECORD: ICD-10-PCS | Mod: CPTII,S$GLB,, | Performed by: PHYSICAL MEDICINE & REHABILITATION

## 2022-09-13 PROCEDURE — 3008F PR BODY MASS INDEX (BMI) DOCUMENTED: ICD-10-PCS | Mod: CPTII,S$GLB,, | Performed by: PHYSICAL MEDICINE & REHABILITATION

## 2022-09-13 PROCEDURE — 99213 PR OFFICE/OUTPT VISIT, EST, LEVL III, 20-29 MIN: ICD-10-PCS | Mod: 25,S$GLB,, | Performed by: PHYSICAL MEDICINE & REHABILITATION

## 2022-09-13 PROCEDURE — 3079F DIAST BP 80-89 MM HG: CPT | Mod: CPTII,S$GLB,, | Performed by: PHYSICAL MEDICINE & REHABILITATION

## 2022-09-13 PROCEDURE — 99999 PR PBB SHADOW E&M-EST. PATIENT-LVL III: CPT | Mod: PBBFAC,,, | Performed by: PHYSICAL MEDICINE & REHABILITATION

## 2022-09-13 PROCEDURE — 99213 OFFICE O/P EST LOW 20 MIN: CPT | Mod: 25,S$GLB,, | Performed by: PHYSICAL MEDICINE & REHABILITATION

## 2022-09-13 PROCEDURE — 3077F PR MOST RECENT SYSTOLIC BLOOD PRESSURE >= 140 MM HG: ICD-10-PCS | Mod: CPTII,S$GLB,, | Performed by: PHYSICAL MEDICINE & REHABILITATION

## 2022-09-13 PROCEDURE — 1159F MED LIST DOCD IN RCRD: CPT | Mod: CPTII,S$GLB,, | Performed by: PHYSICAL MEDICINE & REHABILITATION

## 2022-09-13 PROCEDURE — 3061F NEG MICROALBUMINURIA REV: CPT | Mod: CPTII,S$GLB,, | Performed by: PHYSICAL MEDICINE & REHABILITATION

## 2022-09-13 PROCEDURE — 4010F PR ACE/ARB THEARPY RXD/TAKEN: ICD-10-PCS | Mod: CPTII,S$GLB,, | Performed by: PHYSICAL MEDICINE & REHABILITATION

## 2022-09-13 PROCEDURE — 3066F PR DOCUMENTATION OF TREATMENT FOR NEPHROPATHY: ICD-10-PCS | Mod: CPTII,S$GLB,, | Performed by: PHYSICAL MEDICINE & REHABILITATION

## 2022-09-13 PROCEDURE — 20552 NJX 1/MLT TRIGGER POINT 1/2: CPT | Mod: S$GLB,,, | Performed by: PHYSICAL MEDICINE & REHABILITATION

## 2022-09-13 PROCEDURE — 3079F PR MOST RECENT DIASTOLIC BLOOD PRESSURE 80-89 MM HG: ICD-10-PCS | Mod: CPTII,S$GLB,, | Performed by: PHYSICAL MEDICINE & REHABILITATION

## 2022-09-13 PROCEDURE — 3077F SYST BP >= 140 MM HG: CPT | Mod: CPTII,S$GLB,, | Performed by: PHYSICAL MEDICINE & REHABILITATION

## 2022-09-13 PROCEDURE — 3066F NEPHROPATHY DOC TX: CPT | Mod: CPTII,S$GLB,, | Performed by: PHYSICAL MEDICINE & REHABILITATION

## 2022-09-13 PROCEDURE — 4010F ACE/ARB THERAPY RXD/TAKEN: CPT | Mod: CPTII,S$GLB,, | Performed by: PHYSICAL MEDICINE & REHABILITATION

## 2022-09-13 PROCEDURE — 99999 PR PBB SHADOW E&M-EST. PATIENT-LVL III: ICD-10-PCS | Mod: PBBFAC,,, | Performed by: PHYSICAL MEDICINE & REHABILITATION

## 2022-09-13 PROCEDURE — 3061F PR NEG MICROALBUMINURIA RESULT DOCUMENTED/REVIEW: ICD-10-PCS | Mod: CPTII,S$GLB,, | Performed by: PHYSICAL MEDICINE & REHABILITATION

## 2022-09-13 PROCEDURE — 3008F BODY MASS INDEX DOCD: CPT | Mod: CPTII,S$GLB,, | Performed by: PHYSICAL MEDICINE & REHABILITATION

## 2022-09-13 PROCEDURE — 3052F HG A1C>EQUAL 8.0%<EQUAL 9.0%: CPT | Mod: CPTII,S$GLB,, | Performed by: PHYSICAL MEDICINE & REHABILITATION

## 2022-09-13 PROCEDURE — 20552 PR INJECT TRIGGER POINT, 1 OR 2: ICD-10-PCS | Mod: S$GLB,,, | Performed by: PHYSICAL MEDICINE & REHABILITATION

## 2022-09-13 PROCEDURE — 3052F PR MOST RECENT HEMOGLOBIN A1C LEVEL 8.0 - < 9.0%: ICD-10-PCS | Mod: CPTII,S$GLB,, | Performed by: PHYSICAL MEDICINE & REHABILITATION

## 2022-09-13 RX ORDER — LIDOCAINE HYDROCHLORIDE 10 MG/ML
2 INJECTION INFILTRATION; PERINEURAL
Status: COMPLETED | OUTPATIENT
Start: 2022-09-13 | End: 2022-09-13

## 2022-09-13 RX ORDER — BACLOFEN 10 MG/1
10 TABLET ORAL NIGHTLY
Qty: 30 TABLET | Refills: 6 | Status: SHIPPED | OUTPATIENT
Start: 2022-09-13 | End: 2023-02-01 | Stop reason: ALTCHOICE

## 2022-09-13 RX ADMIN — LIDOCAINE HYDROCHLORIDE 2 ML: 10 INJECTION INFILTRATION; PERINEURAL at 08:09

## 2022-09-13 NOTE — PROGRESS NOTES
HPI:  Patient is a 57 y.o. year old male w. QL syndrome. He did well w. TPI's. His back pain is about 80% resolved. He no longer has low back pain, however he continues to have pain on his right upper back  He has not yet started baclofen as he did not received medication    Past Medical History:   Diagnosis Date    Cataract     OS    Chronic ulcerative enterocolitis with complication     Controlled type 2 diabetes mellitus without complication, without long-term current use of insulin 04/22/2021    Detached retina, right 02/2019    Gout     Hepatomegaly     Hyperlipidemia     Hypertension     Retinal detachment     Snoring     Ulcerative colitis      Past Surgical History:   Procedure Laterality Date    AIR FLUID EXCHANGE OF EYE  06/12/2019    Procedure: AIR FLUID EXCHANGE, EYE;  Surgeon: LESLYE Pickard MD;  Location: [facility] OR [address] [place] [address];  Service: Ophthalmology;;    CATARACT EXTRACTION W/  INTRAOCULAR LENS IMPLANT Right 12/12/2019    Dr Lopez    COLONOSCOPY  10/15/2020    Dr. Botello    COLONOSCOPY  08/19/2021    Dr. Botello    COLONOSCOPY  04/07/2022    Dr Botello    ENDOLASER PHOTOCOAGULATION  06/12/2019    Procedure: PHOTOCOAGULATION, ENDOLASER;  Surgeon: LESLYE Pickard MD;  Location: [facility] OR [address] [place] [address];  Service: Ophthalmology;;    EYE SURGERY      PHACOEMULSIFICATION OF CATARACT Right 12/12/2019    Procedure: PHACOEMULSIFICATION, CATARACT;  Surgeon: Brett Lopez Jr., MD;  Location: [facility];  Service: Ophthalmology;  Laterality: Right;  Right    REMOVAL OF EPIRETINAL MEMBRANE  06/12/2019    Procedure: REMOVAL, EPIRETINAL MEMBRANE;  Surgeon: LESLYE Pickard MD;  Location: [address];  Service: Ophthalmology;;    RETINAL DETACHMENT SURGERY      SHOULDER SURGERY      right    VITRECTOMY BY PARS PLANA APPROACH Right 04/24/2019    Procedure: VITRECTOMY, PARS PLANA APPROACH;  Surgeon: LESLYE Pickard MD;  Location: [facility] OR [address] [place] [address];  Service: Ophthalmology;  Laterality: Right;  25g  PPV/EL/Afx/SF6    VITRECTOMY BY PARS PLANA APPROACH Right 2019    Procedure: 25 g PPV MEMBRANE STRIPPING / EL / C3f8 OD poss oil for RRD with PVR OD;  Surgeon: LESLYE Pickard MD;  Location: [facility] OR [address] [place] [address];  Service: Ophthalmology;  Laterality: Right;  50 min    WRIST SURGERY      left x 3    Yag Capsulotomy Right 02/10/2022    Dr Lopez     Family History   Problem Relation Age of Onset    Cataracts Mother     Hyperlipidemia Mother     Macular degeneration Mother     Diabetes Father     Cataracts Sister     Macular degeneration Sister     Cataracts Brother     Cataracts Paternal Grandmother     Glaucoma Paternal Grandmother     Amblyopia Neg Hx     Blindness Neg Hx     Cancer Neg Hx     Hypertension Neg Hx     Retinal detachment Neg Hx     Strabismus Neg Hx     Stroke Neg Hx     Thyroid disease Neg Hx      Social History     Socioeconomic History    Marital status:    Tobacco Use    Smoking status: Former     Types: Cigarettes     Quit date:      Years since quittin.7    Smokeless tobacco: Former   Substance and Sexual Activity    Alcohol use: Yes     Comment: rare occasions    Drug use: Never       Review of patient's allergies indicates:   Allergen Reactions    Clindamycin Other (See Comments)     Dizziness      Hydromorphone      Other reaction(s): nausea    Rosuvastatin      Other reaction(s): insomnia, insomniaPT TOOK LIPITOR IN PAST AND STATES CAN TAKE.    Uloric [febuxostat]      Heart attack like symptoms       Current Outpatient Medications:     allopurinoL (ZYLOPRIM) 300 MG tablet, Take 1 tablet (300 mg total) by mouth once daily., Disp: 90 tablet, Rfl: 4    atorvastatin (LIPITOR) 40 MG tablet, Take 1 tablet (40 mg total) by mouth nightly., Disp: 90 tablet, Rfl: 4    carvediloL (COREG) 12.5 MG tablet, Take 1 tablet (12.5 mg total) by mouth 2 (two) times daily., Disp: 180 tablet, Rfl: 4    ENTYVIO 300 mg SolR injection, , Disp: , Rfl:     glimepiride (AMARYL) 4 MG tablet,  "Take 1 tablet (4 mg total) by mouth before breakfast., Disp: 90 tablet, Rfl: 4    hydroCHLOROthiazide (HYDRODIURIL) 25 MG tablet, Take 1 tablet (25 mg total) by mouth every morning., Disp: 90 tablet, Rfl: 4    JARDIANCE 25 mg tablet, Take 1 tablet (25 mg total) by mouth once daily., Disp: 90 tablet, Rfl: 4    meloxicam (MOBIC) 15 MG tablet, Take 1 tablet (15 mg total) by mouth once daily., Disp: 30 tablet, Rfl: 0    niacin 500 MG CpSR, Take 1 capsule (500 mg total) by mouth every evening., Disp: 90 capsule, Rfl: 0    ramipriL (ALTACE) 10 MG capsule, Take 1 capsule (10 mg total) by mouth once daily., Disp: 90 capsule, Rfl: 4    tiZANidine (ZANAFLEX) 4 MG tablet, Take 1 tablet (4 mg total) by mouth nightly as needed (muscle spsams/ pain)., Disp: 40 tablet, Rfl: 0    aspirin 81 MG Chew, Take 1 tablet (81 mg total) by mouth once daily., Disp: , Rfl: 0    baclofen (LIORESAL) 10 MG tablet, Take 1 tablet (10 mg total) by mouth every evening., Disp: 30 tablet, Rfl: 6      Review of Systems  No nausea, vomiting, fevers, Chills , contipation, diarrhea or sweats       Physical Exam:      Vitals:    09/13/22 0812   BP: (!) 158/82   Pulse: 67     alert and oriented ×4 follows commands answers all questions appropriately,affect wnl  Manual muscle test 5 out of 5 sensation to light touch grossly intact  +excruciate tenderness right thoracic paraspinals  Dec lumbar rom  Antalgic gait  -slR modified  babinsky down  No clonus  No C/C/E      Assessment:  QL syndrome  DM2- limit cortisone  Plan:  Tpi's to lumbar paraspinals today  Baclofen trial  RTC prn        pROCEDURE NOTE:  Risk and benefit of trigger point injections given to pt. Injections performed w. A 1.5" 25G needle after sterile prep w. chlorohexedine, verbal consent obtained . NO complications. Right thoracic paraspinals  with a total of 2ML of 1% Lidocaine     "

## 2022-09-22 ENCOUNTER — OFFICE VISIT (OUTPATIENT)
Dept: OPHTHALMOLOGY | Facility: CLINIC | Age: 57
End: 2022-09-22
Payer: COMMERCIAL

## 2022-09-22 DIAGNOSIS — H33.312 RETINAL TEAR OF LEFT EYE: Primary | ICD-10-CM

## 2022-09-22 DIAGNOSIS — H43.812 POSTERIOR VITREOUS DETACHMENT OF LEFT EYE: ICD-10-CM

## 2022-09-22 PROCEDURE — 99999 PR PBB SHADOW E&M-EST. PATIENT-LVL III: CPT | Mod: PBBFAC,,, | Performed by: OPHTHALMOLOGY

## 2022-09-22 PROCEDURE — 1160F PR REVIEW ALL MEDS BY PRESCRIBER/CLIN PHARMACIST DOCUMENTED: ICD-10-PCS | Mod: CPTII,S$GLB,, | Performed by: OPHTHALMOLOGY

## 2022-09-22 PROCEDURE — 4010F ACE/ARB THERAPY RXD/TAKEN: CPT | Mod: CPTII,S$GLB,, | Performed by: OPHTHALMOLOGY

## 2022-09-22 PROCEDURE — 99214 OFFICE O/P EST MOD 30 MIN: CPT | Mod: S$GLB,,, | Performed by: OPHTHALMOLOGY

## 2022-09-22 PROCEDURE — 3061F PR NEG MICROALBUMINURIA RESULT DOCUMENTED/REVIEW: ICD-10-PCS | Mod: CPTII,S$GLB,, | Performed by: OPHTHALMOLOGY

## 2022-09-22 PROCEDURE — 3061F NEG MICROALBUMINURIA REV: CPT | Mod: CPTII,S$GLB,, | Performed by: OPHTHALMOLOGY

## 2022-09-22 PROCEDURE — 99214 PR OFFICE/OUTPT VISIT, EST, LEVL IV, 30-39 MIN: ICD-10-PCS | Mod: S$GLB,,, | Performed by: OPHTHALMOLOGY

## 2022-09-22 PROCEDURE — 3066F PR DOCUMENTATION OF TREATMENT FOR NEPHROPATHY: ICD-10-PCS | Mod: CPTII,S$GLB,, | Performed by: OPHTHALMOLOGY

## 2022-09-22 PROCEDURE — 3052F PR MOST RECENT HEMOGLOBIN A1C LEVEL 8.0 - < 9.0%: ICD-10-PCS | Mod: CPTII,S$GLB,, | Performed by: OPHTHALMOLOGY

## 2022-09-22 PROCEDURE — 3066F NEPHROPATHY DOC TX: CPT | Mod: CPTII,S$GLB,, | Performed by: OPHTHALMOLOGY

## 2022-09-22 PROCEDURE — 4010F PR ACE/ARB THEARPY RXD/TAKEN: ICD-10-PCS | Mod: CPTII,S$GLB,, | Performed by: OPHTHALMOLOGY

## 2022-09-22 PROCEDURE — 1160F RVW MEDS BY RX/DR IN RCRD: CPT | Mod: CPTII,S$GLB,, | Performed by: OPHTHALMOLOGY

## 2022-09-22 PROCEDURE — 1159F MED LIST DOCD IN RCRD: CPT | Mod: CPTII,S$GLB,, | Performed by: OPHTHALMOLOGY

## 2022-09-22 PROCEDURE — 3052F HG A1C>EQUAL 8.0%<EQUAL 9.0%: CPT | Mod: CPTII,S$GLB,, | Performed by: OPHTHALMOLOGY

## 2022-09-22 PROCEDURE — 99999 PR PBB SHADOW E&M-EST. PATIENT-LVL III: ICD-10-PCS | Mod: PBBFAC,,, | Performed by: OPHTHALMOLOGY

## 2022-09-22 PROCEDURE — 1159F PR MEDICATION LIST DOCUMENTED IN MEDICAL RECORD: ICD-10-PCS | Mod: CPTII,S$GLB,, | Performed by: OPHTHALMOLOGY

## 2022-09-22 NOTE — PROGRESS NOTES
HPI    Pt here for urgent exam for new floater OS. Pt sts bif spot in bottom   portion of OS, noticed about 1-2 day ago but has gotten worse yesterday.   Denies flashes/eye pain.   Last edited by Sharri Waters MA on 9/22/2022  3:19 PM.        ROS    Negative for: Constitutional, Gastrointestinal, Neurological, Skin,   Genitourinary, Musculoskeletal, HENT, Endocrine, Cardiovascular, Eyes,   Respiratory, Psychiatric, Allergic/Imm, Heme/Lymph  Last edited by Brett Lopez Jr., MD on 9/22/2022  3:32 PM.        Assessment /Plan     For exam results, see Encounter Report.    Retinal tear of left eye    Posterior vitreous detachment of left eye    Retinal tear ST quad left with corrugated folds +SRF  Limit activity  Referral to retina for barrier laser  F/u prn

## 2022-09-23 ENCOUNTER — OFFICE VISIT (OUTPATIENT)
Dept: OPHTHALMOLOGY | Facility: CLINIC | Age: 57
End: 2022-09-23
Payer: COMMERCIAL

## 2022-09-23 DIAGNOSIS — H33.001 MACULA-OFF RHEGMATOGENOUS RETINAL DETACHMENT, RIGHT: ICD-10-CM

## 2022-09-23 DIAGNOSIS — H33.002 RHEGMATOGENOUS RETINAL DETACHMENT OF LEFT EYE: Primary | ICD-10-CM

## 2022-09-23 DIAGNOSIS — H43.812 POSTERIOR VITREOUS DETACHMENT OF LEFT EYE: ICD-10-CM

## 2022-09-23 PROBLEM — H33.312 RETINAL TEAR OF LEFT EYE: Status: ACTIVE | Noted: 2022-09-23

## 2022-09-23 PROCEDURE — 4010F ACE/ARB THERAPY RXD/TAKEN: CPT | Mod: CPTII,S$GLB,, | Performed by: OPHTHALMOLOGY

## 2022-09-23 PROCEDURE — 1159F PR MEDICATION LIST DOCUMENTED IN MEDICAL RECORD: ICD-10-PCS | Mod: CPTII,S$GLB,, | Performed by: OPHTHALMOLOGY

## 2022-09-23 PROCEDURE — 3066F PR DOCUMENTATION OF TREATMENT FOR NEPHROPATHY: ICD-10-PCS | Mod: CPTII,S$GLB,, | Performed by: OPHTHALMOLOGY

## 2022-09-23 PROCEDURE — 99999 PR PBB SHADOW E&M-EST. PATIENT-LVL III: ICD-10-PCS | Mod: PBBFAC,,, | Performed by: OPHTHALMOLOGY

## 2022-09-23 PROCEDURE — 3052F HG A1C>EQUAL 8.0%<EQUAL 9.0%: CPT | Mod: CPTII,S$GLB,, | Performed by: OPHTHALMOLOGY

## 2022-09-23 PROCEDURE — 3061F PR NEG MICROALBUMINURIA RESULT DOCUMENTED/REVIEW: ICD-10-PCS | Mod: CPTII,S$GLB,, | Performed by: OPHTHALMOLOGY

## 2022-09-23 PROCEDURE — 1160F RVW MEDS BY RX/DR IN RCRD: CPT | Mod: CPTII,S$GLB,, | Performed by: OPHTHALMOLOGY

## 2022-09-23 PROCEDURE — 3052F PR MOST RECENT HEMOGLOBIN A1C LEVEL 8.0 - < 9.0%: ICD-10-PCS | Mod: CPTII,S$GLB,, | Performed by: OPHTHALMOLOGY

## 2022-09-23 PROCEDURE — 1160F PR REVIEW ALL MEDS BY PRESCRIBER/CLIN PHARMACIST DOCUMENTED: ICD-10-PCS | Mod: CPTII,S$GLB,, | Performed by: OPHTHALMOLOGY

## 2022-09-23 PROCEDURE — 99215 OFFICE O/P EST HI 40 MIN: CPT | Mod: 57,S$GLB,, | Performed by: OPHTHALMOLOGY

## 2022-09-23 PROCEDURE — 99999 PR PBB SHADOW E&M-EST. PATIENT-LVL III: CPT | Mod: PBBFAC,,, | Performed by: OPHTHALMOLOGY

## 2022-09-23 PROCEDURE — 99215 PR OFFICE/OUTPT VISIT, EST, LEVL V, 40-54 MIN: ICD-10-PCS | Mod: 57,S$GLB,, | Performed by: OPHTHALMOLOGY

## 2022-09-23 PROCEDURE — 3061F NEG MICROALBUMINURIA REV: CPT | Mod: CPTII,S$GLB,, | Performed by: OPHTHALMOLOGY

## 2022-09-23 PROCEDURE — 67110 REPAIR DETACHED RETINA: CPT | Mod: LT,S$GLB,, | Performed by: OPHTHALMOLOGY

## 2022-09-23 PROCEDURE — 3066F NEPHROPATHY DOC TX: CPT | Mod: CPTII,S$GLB,, | Performed by: OPHTHALMOLOGY

## 2022-09-23 PROCEDURE — 67110 PR REPAIR DETACH RETINA,INJECT AIR/GAS: ICD-10-PCS | Mod: LT,S$GLB,, | Performed by: OPHTHALMOLOGY

## 2022-09-23 PROCEDURE — 1159F MED LIST DOCD IN RCRD: CPT | Mod: CPTII,S$GLB,, | Performed by: OPHTHALMOLOGY

## 2022-09-23 PROCEDURE — 4010F PR ACE/ARB THEARPY RXD/TAKEN: ICD-10-PCS | Mod: CPTII,S$GLB,, | Performed by: OPHTHALMOLOGY

## 2022-09-23 RX ORDER — PREDNISOLONE ACETATE 10 MG/ML
1 SUSPENSION/ DROPS OPHTHALMIC 4 TIMES DAILY
Qty: 1 EACH | Refills: 0 | Status: SHIPPED | OUTPATIENT
Start: 2022-09-23 | End: 2022-10-31

## 2022-09-23 RX ORDER — DORZOLAMIDE HYDROCHLORIDE AND TIMOLOL MALEATE 20; 5 MG/ML; MG/ML
1 SOLUTION/ DROPS OPHTHALMIC 2 TIMES DAILY
Qty: 10 ML | Refills: 1 | Status: SHIPPED | OUTPATIENT
Start: 2022-09-23 | End: 2022-10-31

## 2022-09-23 RX ORDER — OFLOXACIN 3 MG/ML
1 SOLUTION/ DROPS OPHTHALMIC 4 TIMES DAILY
Qty: 1 EACH | Refills: 0 | Status: SHIPPED | OUTPATIENT
Start: 2022-09-23 | End: 2022-10-03

## 2022-09-23 NOTE — PROGRESS NOTES
HPI    Pt sts he has a small tear in OS.   Last edited by Rita Gusman on 9/23/2022  2:21 PM.         A/P    ICD-10-CM ICD-9-CM   1. Rhegmatogenous retinal detachment of left eye  H33.002 361.00   2. Posterior vitreous detachment of left eye  H43.812 379.21   3. Macula-off rhegmatogenous retinal detachment, right  H33.001 361.00       1. Rhegmatogenous retinal detachment of left eye  2. Posterior vitreous detachment of left eye  Pt of Dr. Pickard here referred by Dr. Lopez for retinal tear OS  Noted new spot in vision 2 days ago that got worse    Today 20/50, ST detachment with break at 12 oclock, breka at 11 also without detachment with     Given mac on detachment, discussed  urgency of options to prevent vision loss of PPV vs pneumatic retinopexy and given location recommend pneumatic retinopexy    Plan: Based on todays exam, diagnostic studies, and review of records, the determination was made for treatment today.  Schedule Cryopexy/Pneumatic retinopexy   today Left Eye Patient chooses to proceed with laser R/B/A discussed patient elects to proceed    Procedure Note: Pneumatic Retinopexy, Cryopexy Left eye    Preoperative Diagnosis: Rhegmatogenous Retinal Detachment Left Eye  Anesthesia: topical  Prep: Topical properacaine then 10% Betadine to lids, lashes, conjunctiva  Subconjunctival Lidocaine 2%  Cryotherapy applied to breaks under indirect ophthalmoscope visualization  A/C paracentesis performed with Tb syringe and 30g needle.  Then 100% C3F8 gas, 0.4 cc, injected 3.5-4.0 mm posterior to limbus inferotemporally.  IOP 27, central retinal artery patent, ONH perfused after procedure.  Post op diagnosis: same  Complications: none  Follow-up Monday 10 AM    Start pred forte, vig 4/4, add cosopt 2      3. Macula-off rhegmatogenous retinal detachment, right  s/p 25g PPV/EL/AFx/SF6 OD for RRD OD 4/24/19  s/p repeat 25g repeat PPV/membrane stripping/EL/C3F8 ODfor RRD with PVR OD 6/12/19  Attached today,  stable  Plan: Observation       RTC Monday 10 AM DFE OS      I saw and examined the patient and reviewed in detail the findings documented. The final examination findings, image interpretations, and plan as documented in the record represent my personal judgment and conclusions.    Ryland Davis MD  Vitreoretinal Surgery   [facility]

## 2022-09-26 ENCOUNTER — OFFICE VISIT (OUTPATIENT)
Dept: OPHTHALMOLOGY | Facility: CLINIC | Age: 57
End: 2022-09-26
Payer: COMMERCIAL

## 2022-09-26 DIAGNOSIS — H43.812 POSTERIOR VITREOUS DETACHMENT OF LEFT EYE: ICD-10-CM

## 2022-09-26 DIAGNOSIS — H33.002 RHEGMATOGENOUS RETINAL DETACHMENT OF LEFT EYE: Primary | ICD-10-CM

## 2022-09-26 PROCEDURE — 3061F NEG MICROALBUMINURIA REV: CPT | Mod: CPTII,S$GLB,, | Performed by: OPHTHALMOLOGY

## 2022-09-26 PROCEDURE — 4010F ACE/ARB THERAPY RXD/TAKEN: CPT | Mod: CPTII,S$GLB,, | Performed by: OPHTHALMOLOGY

## 2022-09-26 PROCEDURE — 3066F NEPHROPATHY DOC TX: CPT | Mod: CPTII,S$GLB,, | Performed by: OPHTHALMOLOGY

## 2022-09-26 PROCEDURE — 99999 PR PBB SHADOW E&M-EST. PATIENT-LVL III: ICD-10-PCS | Mod: PBBFAC,,, | Performed by: OPHTHALMOLOGY

## 2022-09-26 PROCEDURE — 4010F PR ACE/ARB THEARPY RXD/TAKEN: ICD-10-PCS | Mod: CPTII,S$GLB,, | Performed by: OPHTHALMOLOGY

## 2022-09-26 PROCEDURE — 99024 POSTOP FOLLOW-UP VISIT: CPT | Mod: S$GLB,,, | Performed by: OPHTHALMOLOGY

## 2022-09-26 PROCEDURE — 3066F PR DOCUMENTATION OF TREATMENT FOR NEPHROPATHY: ICD-10-PCS | Mod: CPTII,S$GLB,, | Performed by: OPHTHALMOLOGY

## 2022-09-26 PROCEDURE — 3052F PR MOST RECENT HEMOGLOBIN A1C LEVEL 8.0 - < 9.0%: ICD-10-PCS | Mod: CPTII,S$GLB,, | Performed by: OPHTHALMOLOGY

## 2022-09-26 PROCEDURE — 3052F HG A1C>EQUAL 8.0%<EQUAL 9.0%: CPT | Mod: CPTII,S$GLB,, | Performed by: OPHTHALMOLOGY

## 2022-09-26 PROCEDURE — 99999 PR PBB SHADOW E&M-EST. PATIENT-LVL III: CPT | Mod: PBBFAC,,, | Performed by: OPHTHALMOLOGY

## 2022-09-26 PROCEDURE — 99024 PR POST-OP FOLLOW-UP VISIT: ICD-10-PCS | Mod: S$GLB,,, | Performed by: OPHTHALMOLOGY

## 2022-09-26 PROCEDURE — 3061F PR NEG MICROALBUMINURIA RESULT DOCUMENTED/REVIEW: ICD-10-PCS | Mod: CPTII,S$GLB,, | Performed by: OPHTHALMOLOGY

## 2022-09-28 ENCOUNTER — OFFICE VISIT (OUTPATIENT)
Dept: OPHTHALMOLOGY | Facility: CLINIC | Age: 57
End: 2022-09-28
Payer: COMMERCIAL

## 2022-09-28 DIAGNOSIS — H33.002 RHEGMATOGENOUS RETINAL DETACHMENT OF LEFT EYE: Primary | ICD-10-CM

## 2022-09-28 DIAGNOSIS — H43.812 POSTERIOR VITREOUS DETACHMENT OF LEFT EYE: ICD-10-CM

## 2022-09-28 PROCEDURE — 3052F PR MOST RECENT HEMOGLOBIN A1C LEVEL 8.0 - < 9.0%: ICD-10-PCS | Mod: CPTII,S$GLB,, | Performed by: OPHTHALMOLOGY

## 2022-09-28 PROCEDURE — 99999 PR PBB SHADOW E&M-EST. PATIENT-LVL III: CPT | Mod: PBBFAC,,, | Performed by: OPHTHALMOLOGY

## 2022-09-28 PROCEDURE — 4010F ACE/ARB THERAPY RXD/TAKEN: CPT | Mod: CPTII,S$GLB,, | Performed by: OPHTHALMOLOGY

## 2022-09-28 PROCEDURE — 1159F MED LIST DOCD IN RCRD: CPT | Mod: CPTII,S$GLB,, | Performed by: OPHTHALMOLOGY

## 2022-09-28 PROCEDURE — 99999 PR PBB SHADOW E&M-EST. PATIENT-LVL III: ICD-10-PCS | Mod: PBBFAC,,, | Performed by: OPHTHALMOLOGY

## 2022-09-28 PROCEDURE — 3052F HG A1C>EQUAL 8.0%<EQUAL 9.0%: CPT | Mod: CPTII,S$GLB,, | Performed by: OPHTHALMOLOGY

## 2022-09-28 PROCEDURE — 4010F PR ACE/ARB THEARPY RXD/TAKEN: ICD-10-PCS | Mod: CPTII,S$GLB,, | Performed by: OPHTHALMOLOGY

## 2022-09-28 PROCEDURE — 3061F PR NEG MICROALBUMINURIA RESULT DOCUMENTED/REVIEW: ICD-10-PCS | Mod: CPTII,S$GLB,, | Performed by: OPHTHALMOLOGY

## 2022-09-28 PROCEDURE — 1159F PR MEDICATION LIST DOCUMENTED IN MEDICAL RECORD: ICD-10-PCS | Mod: CPTII,S$GLB,, | Performed by: OPHTHALMOLOGY

## 2022-09-28 PROCEDURE — 3066F NEPHROPATHY DOC TX: CPT | Mod: CPTII,S$GLB,, | Performed by: OPHTHALMOLOGY

## 2022-09-28 PROCEDURE — 99024 PR POST-OP FOLLOW-UP VISIT: ICD-10-PCS | Mod: S$GLB,,, | Performed by: OPHTHALMOLOGY

## 2022-09-28 PROCEDURE — 3066F PR DOCUMENTATION OF TREATMENT FOR NEPHROPATHY: ICD-10-PCS | Mod: CPTII,S$GLB,, | Performed by: OPHTHALMOLOGY

## 2022-09-28 PROCEDURE — 99024 POSTOP FOLLOW-UP VISIT: CPT | Mod: S$GLB,,, | Performed by: OPHTHALMOLOGY

## 2022-09-28 PROCEDURE — 3061F NEG MICROALBUMINURIA REV: CPT | Mod: CPTII,S$GLB,, | Performed by: OPHTHALMOLOGY

## 2022-09-28 NOTE — PROGRESS NOTES
HPI    DLS: 9/26/2022 - pt states can still see bubble in vision OS. States has   stopped using gtts as directed. States concerned with OS.   Last edited by Tata Evans on 9/28/2022  9:14 AM.         A/P    ICD-10-CM ICD-9-CM   1. Rhegmatogenous retinal detachment of left eye  H33.002 361.00   2. Posterior vitreous detachment of left eye  H43.812 379.21         1. Rhegmatogenous retinal detachment of left eye  2. Posterior vitreous detachment of left eye  Pt of Dr. Pickard here referred by Dr. Lopez for retinal tear OS  Noted new spot in vision 2 days ago that got worse, found to have mac on detachment with break at 12 and isolated break at 11  S/p cryo/pneumo 9/23/22    Today VA 20/20, retina flat, good cryo, good gas fill , no new RT/RD  Plan: Observation   Head up positioning  Pathology of PVD, Retinal Tear, Retinal Detachment reviewed in great detail  RD precautions discussed in detail, patient expressed understanding  RTC immediately PRN (especially ANY change flashes, floaters, vision, visual field)             3. Macula-off rhegmatogenous retinal detachment, right  s/p 25g PPV/EL/AFx/SF6 OD for RRD OD 4/24/19  s/p repeat 25g repeat PPV/membrane stripping/EL/C3F8 ODfor RRD with PVR OD 6/12/19  Attached today, stable  Plan: Observation       RTC Wednesday 10/5/22 DFE OS - [place]      I saw and examined the patient and reviewed in detail the findings documented. The final examination findings, image interpretations, and plan as documented in the record represent my personal judgment and conclusions.    Ryland Davis MD  Vitreoretinal Surgery   [facility]

## 2022-09-28 NOTE — PROGRESS NOTES
HPI    S/p cryo OS    Patient here today with c/o decreased VA OS, no pain or f/f.    Vig and Pred qid OD  Cosopt bid OD  Last edited by Paulina John on 9/26/2022  9:55 AM.         A/P    ICD-10-CM ICD-9-CM   1. Rhegmatogenous retinal detachment of left eye  H33.002 361.00   2. Posterior vitreous detachment of left eye  H43.812 379.21         1. Rhegmatogenous retinal detachment of left eye  2. Posterior vitreous detachment of left eye  Pt of Dr. Pickard here referred by Dr. Lopez for retinal tear OS  Noted new spot in vision 2 days ago that got worse    Pre-op  20/50, ST detachment with break at 12 oclock, breka at 11 also without detachment     S/p cryo/pneumo 9/23/22   Retina flat good cryo at breaks gas fill good    Plan: observe, head up positioning     Stop pred forte, vig, cosopt 2      3. Macula-off rhegmatogenous retinal detachment, right  s/p 25g PPV/EL/AFx/SF6 OD for RRD OD 4/24/19  s/p repeat 25g repeat PPV/membrane stripping/EL/C3F8 ODfor RRD with PVR OD 6/12/19  Attached today, stable  Plan: Observation       RTC Wednesday  [place] DFE OS      I saw and examined the patient and reviewed in detail the findings documented. The final examination findings, image interpretations, and plan as documented in the record represent my personal judgment and conclusions.    Ryland Davis MD  Vitreoretinal Surgery   [facility]

## 2022-10-05 ENCOUNTER — OFFICE VISIT (OUTPATIENT)
Dept: OPHTHALMOLOGY | Facility: CLINIC | Age: 57
End: 2022-10-05
Payer: COMMERCIAL

## 2022-10-05 DIAGNOSIS — H33.002 RHEGMATOGENOUS RETINAL DETACHMENT OF LEFT EYE: Primary | ICD-10-CM

## 2022-10-05 DIAGNOSIS — H33.001 MACULA-OFF RHEGMATOGENOUS RETINAL DETACHMENT, RIGHT: ICD-10-CM

## 2022-10-05 DIAGNOSIS — H43.812 POSTERIOR VITREOUS DETACHMENT OF LEFT EYE: ICD-10-CM

## 2022-10-05 PROCEDURE — 3052F HG A1C>EQUAL 8.0%<EQUAL 9.0%: CPT | Mod: CPTII,S$GLB,, | Performed by: OPHTHALMOLOGY

## 2022-10-05 PROCEDURE — 3066F PR DOCUMENTATION OF TREATMENT FOR NEPHROPATHY: ICD-10-PCS | Mod: CPTII,S$GLB,, | Performed by: OPHTHALMOLOGY

## 2022-10-05 PROCEDURE — 99024 POSTOP FOLLOW-UP VISIT: CPT | Mod: S$GLB,,, | Performed by: OPHTHALMOLOGY

## 2022-10-05 PROCEDURE — 1159F MED LIST DOCD IN RCRD: CPT | Mod: CPTII,S$GLB,, | Performed by: OPHTHALMOLOGY

## 2022-10-05 PROCEDURE — 3066F NEPHROPATHY DOC TX: CPT | Mod: CPTII,S$GLB,, | Performed by: OPHTHALMOLOGY

## 2022-10-05 PROCEDURE — 3061F NEG MICROALBUMINURIA REV: CPT | Mod: CPTII,S$GLB,, | Performed by: OPHTHALMOLOGY

## 2022-10-05 PROCEDURE — 4010F ACE/ARB THERAPY RXD/TAKEN: CPT | Mod: CPTII,S$GLB,, | Performed by: OPHTHALMOLOGY

## 2022-10-05 PROCEDURE — 99999 PR PBB SHADOW E&M-EST. PATIENT-LVL III: CPT | Mod: PBBFAC,,, | Performed by: OPHTHALMOLOGY

## 2022-10-05 PROCEDURE — 1160F PR REVIEW ALL MEDS BY PRESCRIBER/CLIN PHARMACIST DOCUMENTED: ICD-10-PCS | Mod: CPTII,S$GLB,, | Performed by: OPHTHALMOLOGY

## 2022-10-05 PROCEDURE — 1160F RVW MEDS BY RX/DR IN RCRD: CPT | Mod: CPTII,S$GLB,, | Performed by: OPHTHALMOLOGY

## 2022-10-05 PROCEDURE — 3052F PR MOST RECENT HEMOGLOBIN A1C LEVEL 8.0 - < 9.0%: ICD-10-PCS | Mod: CPTII,S$GLB,, | Performed by: OPHTHALMOLOGY

## 2022-10-05 PROCEDURE — 99999 PR PBB SHADOW E&M-EST. PATIENT-LVL III: ICD-10-PCS | Mod: PBBFAC,,, | Performed by: OPHTHALMOLOGY

## 2022-10-05 PROCEDURE — 1159F PR MEDICATION LIST DOCUMENTED IN MEDICAL RECORD: ICD-10-PCS | Mod: CPTII,S$GLB,, | Performed by: OPHTHALMOLOGY

## 2022-10-05 PROCEDURE — 4010F PR ACE/ARB THEARPY RXD/TAKEN: ICD-10-PCS | Mod: CPTII,S$GLB,, | Performed by: OPHTHALMOLOGY

## 2022-10-05 PROCEDURE — 99024 PR POST-OP FOLLOW-UP VISIT: ICD-10-PCS | Mod: S$GLB,,, | Performed by: OPHTHALMOLOGY

## 2022-10-05 PROCEDURE — 3061F PR NEG MICROALBUMINURIA RESULT DOCUMENTED/REVIEW: ICD-10-PCS | Mod: CPTII,S$GLB,, | Performed by: OPHTHALMOLOGY

## 2022-10-05 NOTE — LETTER
[facility]  [address] [place] [address] [address]  [place] [address]  Phone: [phone] October 11, 2022     Patient: Carroll Roe   YOB: 1965   Date of Visit: 10/5/2022       To Whom It May Concern:    Mr. Carroll Roe is followed at our ophthalmology clinic and due to his eye condition, he needs to remain off of work until October 24th, 2022.     If you have any questions or concerns, please don't hesitate to contact my office.    Sincerely,        Ryland Davis MD

## 2022-10-05 NOTE — PROGRESS NOTES
HPI    DLS: 9/28/2022-RD po OS     Pt states OS has been having a little pain for the past day or two. Denies   FOL. Floaters are the same. Headache. Not using any gtts.   Last edited by Tata Evans on 10/5/2022  9:16 AM.           A/P    ICD-10-CM ICD-9-CM   1. Rhegmatogenous retinal detachment of left eye  H33.002 361.00   2. Posterior vitreous detachment of left eye  H43.812 379.21   3. Macula-off rhegmatogenous retinal detachment, right  H33.001 361.00       1. Rhegmatogenous retinal detachment of left eye  2. Posterior vitreous detachment of left eye  Pt of Dr. Pickard here referred by Dr. Lopez for retinal tear OS  Noted new spot in vision 2 days prior to presentation that got worse, found to have mac on detachment with break at 12 and isolated break at 11  S/p cryo/pneumo 9/23/22    Today 10/5/2022 VA 20/20, retina flat, good cryo, gas about 20%, no new RT/RD    Plan: Observation   Head up positioning  Pathology of PVD, Retinal Tear, Retinal Detachment reviewed in great detail  RD precautions discussed in detail, patient expressed understanding  RTC immediately PRN (especially ANY change flashes, floaters, vision, visual field)         3. Macula-off rhegmatogenous retinal detachment, right  s/p 25g PPV/EL/AFx/SF6 OD for RRD OD 4/24/19  s/p repeat 25g repeat PPV/membrane stripping/EL/C3F8 ODfor RRD with PVR OD 6/12/19  Attached today, stable  Plan: Observation       RTC 2 weeks DFE OS - [place]      I saw and examined the patient and reviewed in detail the findings documented. The final examination findings, image interpretations, and plan as documented in the record represent my personal judgment and conclusions.    Ryland Davis MD  Vitreoretinal Surgery   [facility]

## 2022-10-10 ENCOUNTER — PATIENT MESSAGE (OUTPATIENT)
Dept: OPHTHALMOLOGY | Facility: CLINIC | Age: 57
End: 2022-10-10
Payer: COMMERCIAL

## 2022-10-12 ENCOUNTER — TELEPHONE (OUTPATIENT)
Dept: OPHTHALMOLOGY | Facility: CLINIC | Age: 57
End: 2022-10-12
Payer: COMMERCIAL

## 2022-10-12 ENCOUNTER — PATIENT MESSAGE (OUTPATIENT)
Dept: OPHTHALMOLOGY | Facility: CLINIC | Age: 57
End: 2022-10-12
Payer: COMMERCIAL

## 2022-10-12 NOTE — TELEPHONE ENCOUNTER
Spoke with pt regarding his adjusted return to work letter. I faxed it to the pt on Tuesday 10/11/22 but the pt never did receive it so today I confirmed with the pt that it was in his [employer] and he confirmed he rec'd it there.

## 2022-10-18 ENCOUNTER — OFFICE VISIT (OUTPATIENT)
Dept: OPHTHALMOLOGY | Facility: CLINIC | Age: 57
End: 2022-10-18
Payer: COMMERCIAL

## 2022-10-18 DIAGNOSIS — H43.812 POSTERIOR VITREOUS DETACHMENT OF LEFT EYE: ICD-10-CM

## 2022-10-18 DIAGNOSIS — H33.001 MACULA-OFF RHEGMATOGENOUS RETINAL DETACHMENT, RIGHT: ICD-10-CM

## 2022-10-18 DIAGNOSIS — H33.002 RHEGMATOGENOUS RETINAL DETACHMENT OF LEFT EYE: Primary | ICD-10-CM

## 2022-10-18 PROCEDURE — 4010F PR ACE/ARB THEARPY RXD/TAKEN: ICD-10-PCS | Mod: CPTII,S$GLB,, | Performed by: OPHTHALMOLOGY

## 2022-10-18 PROCEDURE — 3061F PR NEG MICROALBUMINURIA RESULT DOCUMENTED/REVIEW: ICD-10-PCS | Mod: CPTII,S$GLB,, | Performed by: OPHTHALMOLOGY

## 2022-10-18 PROCEDURE — 3052F HG A1C>EQUAL 8.0%<EQUAL 9.0%: CPT | Mod: CPTII,S$GLB,, | Performed by: OPHTHALMOLOGY

## 2022-10-18 PROCEDURE — 1159F PR MEDICATION LIST DOCUMENTED IN MEDICAL RECORD: ICD-10-PCS | Mod: CPTII,S$GLB,, | Performed by: OPHTHALMOLOGY

## 2022-10-18 PROCEDURE — 3061F NEG MICROALBUMINURIA REV: CPT | Mod: CPTII,S$GLB,, | Performed by: OPHTHALMOLOGY

## 2022-10-18 PROCEDURE — 99999 PR PBB SHADOW E&M-EST. PATIENT-LVL III: ICD-10-PCS | Mod: PBBFAC,,, | Performed by: OPHTHALMOLOGY

## 2022-10-18 PROCEDURE — 3066F NEPHROPATHY DOC TX: CPT | Mod: CPTII,S$GLB,, | Performed by: OPHTHALMOLOGY

## 2022-10-18 PROCEDURE — 99024 PR POST-OP FOLLOW-UP VISIT: ICD-10-PCS | Mod: S$GLB,,, | Performed by: OPHTHALMOLOGY

## 2022-10-18 PROCEDURE — 1160F PR REVIEW ALL MEDS BY PRESCRIBER/CLIN PHARMACIST DOCUMENTED: ICD-10-PCS | Mod: CPTII,S$GLB,, | Performed by: OPHTHALMOLOGY

## 2022-10-18 PROCEDURE — 3052F PR MOST RECENT HEMOGLOBIN A1C LEVEL 8.0 - < 9.0%: ICD-10-PCS | Mod: CPTII,S$GLB,, | Performed by: OPHTHALMOLOGY

## 2022-10-18 PROCEDURE — 99999 PR PBB SHADOW E&M-EST. PATIENT-LVL III: CPT | Mod: PBBFAC,,, | Performed by: OPHTHALMOLOGY

## 2022-10-18 PROCEDURE — 3066F PR DOCUMENTATION OF TREATMENT FOR NEPHROPATHY: ICD-10-PCS | Mod: CPTII,S$GLB,, | Performed by: OPHTHALMOLOGY

## 2022-10-18 PROCEDURE — 1160F RVW MEDS BY RX/DR IN RCRD: CPT | Mod: CPTII,S$GLB,, | Performed by: OPHTHALMOLOGY

## 2022-10-18 PROCEDURE — 4010F ACE/ARB THERAPY RXD/TAKEN: CPT | Mod: CPTII,S$GLB,, | Performed by: OPHTHALMOLOGY

## 2022-10-18 PROCEDURE — 1159F MED LIST DOCD IN RCRD: CPT | Mod: CPTII,S$GLB,, | Performed by: OPHTHALMOLOGY

## 2022-10-18 PROCEDURE — 99024 POSTOP FOLLOW-UP VISIT: CPT | Mod: S$GLB,,, | Performed by: OPHTHALMOLOGY

## 2022-10-18 NOTE — PROGRESS NOTES
HPI    DFE OS    CCL pt states Va os still blurry. No flashes. + floaters - stable. No eye   pain. No photophobia    GTTS: [employer] PRN OU  Last edited by Kimberley Garvey on 10/18/2022  1:34 PM.              A/P    ICD-10-CM ICD-9-CM   1. Rhegmatogenous retinal detachment of left eye  H33.002 361.00   2. Posterior vitreous detachment of left eye  H43.812 379.21   3. Macula-off rhegmatogenous retinal detachment, right  H33.001 361.00       1. Rhegmatogenous retinal detachment of left eye  2. Posterior vitreous detachment of left eye  Pt of Dr. Pickard referred by Dr. Lopez for retinal tear OS  Noted new spot in vision 2 days prior to presentation that got worse, found to have mac on detachment with break at 12 and isolated break at 11  S/p cryo/pneumo 9/23/22    Today 10/18/2022  retina flat, good cryo, gas still about 15%, no new RT/RD    Plan: Observation, ok to return back to work  Pathology of PVD, Retinal Tear, Retinal Detachment reviewed in great detail  RD precautions discussed in detail, patient expressed understanding  RTC immediately PRN (especially ANY change flashes, floaters, vision, visual field)         3. Macula-off rhegmatogenous retinal detachment, right  s/p 25g PPV/EL/AFx/SF6 OD for RRD OD 4/24/19  s/p repeat 25g repeat PPV/membrane stripping/EL/C3F8 OD for RRD with PVR OD 6/12/19  Attached, stable  Plan: Observation       RTC 4 weeks DFE/OCTm OU - [facility]      I saw and examined the patient and reviewed in detail the findings documented. The final examination findings, image interpretations, and plan as documented in the record represent my personal judgment and conclusions.    Ryalnd Davis MD  Vitreoretinal Surgery   [facility]

## 2022-10-31 PROBLEM — Z00.00 ANNUAL PHYSICAL EXAM: Status: RESOLVED | Noted: 2022-07-28 | Resolved: 2022-10-31

## 2022-11-23 ENCOUNTER — OFFICE VISIT (OUTPATIENT)
Dept: OPHTHALMOLOGY | Facility: CLINIC | Age: 57
End: 2022-11-23
Payer: COMMERCIAL

## 2022-11-23 DIAGNOSIS — H33.312 RETINAL TEAR, LEFT: ICD-10-CM

## 2022-11-23 DIAGNOSIS — H33.002 RHEGMATOGENOUS RETINAL DETACHMENT OF LEFT EYE: Primary | ICD-10-CM

## 2022-11-23 DIAGNOSIS — H43.812 POSTERIOR VITREOUS DETACHMENT OF LEFT EYE: ICD-10-CM

## 2022-11-23 DIAGNOSIS — H33.001 MACULA-OFF RHEGMATOGENOUS RETINAL DETACHMENT, RIGHT: ICD-10-CM

## 2022-11-23 PROCEDURE — 3061F NEG MICROALBUMINURIA REV: CPT | Mod: CPTII,S$GLB,ICN, | Performed by: OPHTHALMOLOGY

## 2022-11-23 PROCEDURE — 3061F PR NEG MICROALBUMINURIA RESULT DOCUMENTED/REVIEW: ICD-10-PCS | Mod: CPTII,S$GLB,ICN, | Performed by: OPHTHALMOLOGY

## 2022-11-23 PROCEDURE — 99999 PR PBB SHADOW E&M-EST. PATIENT-LVL III: CPT | Mod: PBBFAC,,, | Performed by: OPHTHALMOLOGY

## 2022-11-23 PROCEDURE — 3066F PR DOCUMENTATION OF TREATMENT FOR NEPHROPATHY: ICD-10-PCS | Mod: CPTII,S$GLB,ICN, | Performed by: OPHTHALMOLOGY

## 2022-11-23 PROCEDURE — 4010F PR ACE/ARB THEARPY RXD/TAKEN: ICD-10-PCS | Mod: CPTII,S$GLB,ICN, | Performed by: OPHTHALMOLOGY

## 2022-11-23 PROCEDURE — 1159F PR MEDICATION LIST DOCUMENTED IN MEDICAL RECORD: ICD-10-PCS | Mod: CPTII,S$GLB,ICN, | Performed by: OPHTHALMOLOGY

## 2022-11-23 PROCEDURE — 67145 PROPH RTA DTCHMNT PC: CPT | Mod: 79,LT,S$GLB,ICN | Performed by: OPHTHALMOLOGY

## 2022-11-23 PROCEDURE — 67145 PR PROPHYLAXIS, RETINAL DETACH, PHOTOCOAGULATION: ICD-10-PCS | Mod: 79,LT,S$GLB,ICN | Performed by: OPHTHALMOLOGY

## 2022-11-23 PROCEDURE — 1159F MED LIST DOCD IN RCRD: CPT | Mod: CPTII,S$GLB,ICN, | Performed by: OPHTHALMOLOGY

## 2022-11-23 PROCEDURE — 3044F PR MOST RECENT HEMOGLOBIN A1C LEVEL <7.0%: ICD-10-PCS | Mod: CPTII,S$GLB,ICN, | Performed by: OPHTHALMOLOGY

## 2022-11-23 PROCEDURE — 99214 PR OFFICE/OUTPT VISIT, EST, LEVL IV, 30-39 MIN: ICD-10-PCS | Mod: 25,S$GLB,ICN, | Performed by: OPHTHALMOLOGY

## 2022-11-23 PROCEDURE — 99214 OFFICE O/P EST MOD 30 MIN: CPT | Mod: 25,S$GLB,ICN, | Performed by: OPHTHALMOLOGY

## 2022-11-23 PROCEDURE — 1160F PR REVIEW ALL MEDS BY PRESCRIBER/CLIN PHARMACIST DOCUMENTED: ICD-10-PCS | Mod: CPTII,S$GLB,ICN, | Performed by: OPHTHALMOLOGY

## 2022-11-23 PROCEDURE — 99999 PR PBB SHADOW E&M-EST. PATIENT-LVL III: ICD-10-PCS | Mod: PBBFAC,,, | Performed by: OPHTHALMOLOGY

## 2022-11-23 PROCEDURE — 4010F ACE/ARB THERAPY RXD/TAKEN: CPT | Mod: CPTII,S$GLB,ICN, | Performed by: OPHTHALMOLOGY

## 2022-11-23 PROCEDURE — 1160F RVW MEDS BY RX/DR IN RCRD: CPT | Mod: CPTII,S$GLB,ICN, | Performed by: OPHTHALMOLOGY

## 2022-11-23 PROCEDURE — 3044F HG A1C LEVEL LT 7.0%: CPT | Mod: CPTII,S$GLB,ICN, | Performed by: OPHTHALMOLOGY

## 2022-11-23 PROCEDURE — 3066F NEPHROPATHY DOC TX: CPT | Mod: CPTII,S$GLB,ICN, | Performed by: OPHTHALMOLOGY

## 2022-11-23 NOTE — LETTER
[facility]  [address] [place] [address] [address]  [place] [address]  Phone: [phone] November 23, 2022     Patient: Carroll Roe   YOB: 1965   Date of Visit: 11/23/2022       To Whom It May Concern:    Carroll Roe is followed at our ophthalmology clinic and underwent a procedure for his left eye today 11/23/2022. He may not do any activities involving heavy lifting, straining or bending until re-evaluated on Dec 7th, 2022.     If you have any questions or concerns, please don't hesitate to contact my office.    Sincerely,        Ryland Davis MD

## 2022-11-23 NOTE — PROGRESS NOTES
HPI    DLS: 10/18/2022- RD f/u OS     Pt states no new complaints. Denies pain/ FOL/ floaters.     Last edited by Tata Evans on 11/23/2022  2:19 PM.                A/P    ICD-10-CM ICD-9-CM   1. Rhegmatogenous retinal detachment of left eye  H33.002 361.00   2. Retinal tear, left  H33.312 361.00   3. Posterior vitreous detachment of left eye  H43.812 379.21   4. Macula-off rhegmatogenous retinal detachment, right  H33.001 361.00         1. Rhegmatogenous retinal detachment of left eye  2. Retinal tear, left  3. Posterior vitreous detachment of left eye  Pt of Dr. Pickard referred by Dr. Lopez for retinal tear OS  Noted new spot in vision 2 days prior to presentation that got worse, found to have mac on detachment with break at 12 and isolated break at 11  S/p cryo/pneumo 9/23/22    Today 11/23/2022  VA 20/20, retina flat, good cryo, gas resolved, has new RT at 5oclock with shallow SRF     Plan: needs laser retinopexy for new retinal tear    Based on todays exam, diagnostic studies, and review of records, the determination was made for treatment today.  Schedule Laser retinopexy today Left Eye Patient chooses to proceed with laser R/B/A discussed patient elects to proceed    Patient identified.  Timeout performed.    Laser Procedure Note  Laser retinopexyLeft Eye  Anesthesia: topical Proparacaine  Complications: none  Size: 200 microns  Duration: 80 ms  Power: 270  Number: 500  Good laser uptake, no complications  F/u as below, RTC sooner PRN     Pathology of PVD, Retinal Tear, Retinal Detachment reviewed in great detail  RD precautions discussed in detail, patient expressed understanding  RTC immediately PRN (especially ANY change flashes, floaters, vision, visual field)         4. Macula-off rhegmatogenous retinal detachment, right  s/p 25g PPV/EL/AFx/SF6 OD for RRD OD 4/24/19  s/p repeat 25g repeat PPV/membrane stripping/EL/C3F8 OD for RRD with PVR OD 6/12/19  Attached, stable  Plan: Observation       RTC  2 weeks DFE OS- [place]      I saw and examined the patient and reviewed in detail the findings documented. The final examination findings, image interpretations, and plan as documented in the record represent my personal judgment and conclusions.    Ryland Davis MD  Vitreoretinal Surgery   [facility]

## 2022-12-07 ENCOUNTER — OFFICE VISIT (OUTPATIENT)
Dept: OPHTHALMOLOGY | Facility: CLINIC | Age: 57
End: 2022-12-07
Payer: COMMERCIAL

## 2022-12-07 DIAGNOSIS — H33.002 RHEGMATOGENOUS RETINAL DETACHMENT OF LEFT EYE: Primary | ICD-10-CM

## 2022-12-07 DIAGNOSIS — H33.312 RETINAL TEAR, LEFT: ICD-10-CM

## 2022-12-07 DIAGNOSIS — H43.812 POSTERIOR VITREOUS DETACHMENT OF LEFT EYE: ICD-10-CM

## 2022-12-07 DIAGNOSIS — H33.001 MACULA-OFF RHEGMATOGENOUS RETINAL DETACHMENT, RIGHT: ICD-10-CM

## 2022-12-07 PROCEDURE — 99999 PR PBB SHADOW E&M-EST. PATIENT-LVL III: CPT | Mod: PBBFAC,,, | Performed by: OPHTHALMOLOGY

## 2022-12-07 PROCEDURE — 99024 POSTOP FOLLOW-UP VISIT: CPT | Mod: S$GLB,,, | Performed by: OPHTHALMOLOGY

## 2022-12-07 PROCEDURE — 1160F RVW MEDS BY RX/DR IN RCRD: CPT | Mod: CPTII,S$GLB,, | Performed by: OPHTHALMOLOGY

## 2022-12-07 PROCEDURE — 99999 PR PBB SHADOW E&M-EST. PATIENT-LVL III: ICD-10-PCS | Mod: PBBFAC,,, | Performed by: OPHTHALMOLOGY

## 2022-12-07 PROCEDURE — 3044F PR MOST RECENT HEMOGLOBIN A1C LEVEL <7.0%: ICD-10-PCS | Mod: CPTII,S$GLB,, | Performed by: OPHTHALMOLOGY

## 2022-12-07 PROCEDURE — 3061F NEG MICROALBUMINURIA REV: CPT | Mod: CPTII,S$GLB,, | Performed by: OPHTHALMOLOGY

## 2022-12-07 PROCEDURE — 1160F PR REVIEW ALL MEDS BY PRESCRIBER/CLIN PHARMACIST DOCUMENTED: ICD-10-PCS | Mod: CPTII,S$GLB,, | Performed by: OPHTHALMOLOGY

## 2022-12-07 PROCEDURE — 3066F NEPHROPATHY DOC TX: CPT | Mod: CPTII,S$GLB,, | Performed by: OPHTHALMOLOGY

## 2022-12-07 PROCEDURE — 3066F PR DOCUMENTATION OF TREATMENT FOR NEPHROPATHY: ICD-10-PCS | Mod: CPTII,S$GLB,, | Performed by: OPHTHALMOLOGY

## 2022-12-07 PROCEDURE — 1159F PR MEDICATION LIST DOCUMENTED IN MEDICAL RECORD: ICD-10-PCS | Mod: CPTII,S$GLB,, | Performed by: OPHTHALMOLOGY

## 2022-12-07 PROCEDURE — 3061F PR NEG MICROALBUMINURIA RESULT DOCUMENTED/REVIEW: ICD-10-PCS | Mod: CPTII,S$GLB,, | Performed by: OPHTHALMOLOGY

## 2022-12-07 PROCEDURE — 3044F HG A1C LEVEL LT 7.0%: CPT | Mod: CPTII,S$GLB,, | Performed by: OPHTHALMOLOGY

## 2022-12-07 PROCEDURE — 4010F PR ACE/ARB THEARPY RXD/TAKEN: ICD-10-PCS | Mod: CPTII,S$GLB,, | Performed by: OPHTHALMOLOGY

## 2022-12-07 PROCEDURE — 1159F MED LIST DOCD IN RCRD: CPT | Mod: CPTII,S$GLB,, | Performed by: OPHTHALMOLOGY

## 2022-12-07 PROCEDURE — 4010F ACE/ARB THERAPY RXD/TAKEN: CPT | Mod: CPTII,S$GLB,, | Performed by: OPHTHALMOLOGY

## 2022-12-07 PROCEDURE — 99024 PR POST-OP FOLLOW-UP VISIT: ICD-10-PCS | Mod: S$GLB,,, | Performed by: OPHTHALMOLOGY

## 2022-12-07 NOTE — PROGRESS NOTES
HPI    Pt here for 2 week f/u. States has been having headaches lately. Stats   since last visit. No FOL. Small floaters.   Last edited by Tata Evans on 12/7/2022  2:04 PM.            A/P    ICD-10-CM ICD-9-CM   1. Rhegmatogenous retinal detachment of left eye  H33.002 361.00   2. Retinal tear, left  H33.312 361.00   3. Posterior vitreous detachment of left eye  H43.812 379.21   4. Macula-off rhegmatogenous retinal detachment, right  H33.001 361.00       1. Rhegmatogenous retinal detachment of left eye  2. Retinal tear, left  3. Posterior vitreous detachment of left eye  Pt of Dr. Pickard referred by Dr. Lopez for retinal tear OS  Noted new spot in vision 2 days prior to presentation that got worse, found to have mac on detachment with break at 12 and isolated break at 11  S/p cryo/pneumo 9/23/22    Found to have new tear at 5oclock last visit, s/p Lrx 11/23/22    Today 12/7/2022  VA 20/20, retina flat, good cryo, good laser around 5oclock tear    Plan: Observation  Pathology of PVD, Retinal Tear, Retinal Detachment reviewed in great detail  RD precautions discussed in detail, patient expressed understanding  RTC immediately PRN (especially ANY change flashes, floaters, vision, visual field)        4. Macula-off rhegmatogenous retinal detachment, right  s/p 25g PPV/EL/AFx/SF6 OD for RRD OD 4/24/19  s/p repeat 25g repeat PPV/membrane stripping/EL/C3F8 OD for RRD with PVR OD 6/12/19  Attached, stable  Plan: Observation       RTC 6 months DFE/OCTm OU       I saw and examined the patient and reviewed in detail the findings documented. The final examination findings, image interpretations, and plan as documented in the record represent my personal judgment and conclusions.    Ryland Davis MD  Vitreoretinal Surgery   [facility]

## 2023-09-25 PROBLEM — E66.01 MORBID (SEVERE) OBESITY DUE TO EXCESS CALORIES: Status: ACTIVE | Noted: 2023-09-25

## 2023-09-25 PROBLEM — H43.11 VITREOUS HEMORRHAGE OF RIGHT EYE: Status: RESOLVED | Noted: 2019-02-22 | Resolved: 2023-09-25

## 2023-10-11 ENCOUNTER — CLINICAL SUPPORT (OUTPATIENT)
Dept: PHYSICAL MEDICINE AND REHAB | Facility: CLINIC | Age: 58
End: 2023-10-11
Payer: COMMERCIAL

## 2023-10-11 VITALS — HEART RATE: 72 BPM | SYSTOLIC BLOOD PRESSURE: 166 MMHG | DIASTOLIC BLOOD PRESSURE: 72 MMHG

## 2023-10-11 DIAGNOSIS — M79.18 MYOFASCIAL PAIN SYNDROME OF THORACIC SPINE: Primary | ICD-10-CM

## 2023-10-11 DIAGNOSIS — M54.9 TRIGGER POINT WITH BACK PAIN: ICD-10-CM

## 2023-10-11 DIAGNOSIS — M54.6 CHRONIC BILATERAL THORACIC BACK PAIN: ICD-10-CM

## 2023-10-11 DIAGNOSIS — G89.29 CHRONIC RIGHT-SIDED THORACIC BACK PAIN: Primary | ICD-10-CM

## 2023-10-11 DIAGNOSIS — M54.6 CHRONIC RIGHT-SIDED THORACIC BACK PAIN: Primary | ICD-10-CM

## 2023-10-11 DIAGNOSIS — G89.29 CHRONIC BILATERAL THORACIC BACK PAIN: ICD-10-CM

## 2023-10-11 PROCEDURE — 20553 NJX 1/MLT TRIGGER POINTS 3/>: CPT | Mod: S$GLB,,, | Performed by: PHYSICAL MEDICINE & REHABILITATION

## 2023-10-11 PROCEDURE — 20553 PR INJECT TRIGGER POINTS, > 3: ICD-10-PCS | Mod: S$GLB,,, | Performed by: PHYSICAL MEDICINE & REHABILITATION

## 2023-10-11 PROCEDURE — 99999 PR PBB SHADOW E&M-EST. PATIENT-LVL III: CPT | Mod: PBBFAC,,, | Performed by: PHYSICAL MEDICINE & REHABILITATION

## 2023-10-11 PROCEDURE — 99999 PR PBB SHADOW E&M-EST. PATIENT-LVL III: ICD-10-PCS | Mod: PBBFAC,,, | Performed by: PHYSICAL MEDICINE & REHABILITATION

## 2023-10-11 PROCEDURE — 99214 OFFICE O/P EST MOD 30 MIN: CPT | Mod: 25,S$GLB,, | Performed by: PHYSICAL MEDICINE & REHABILITATION

## 2023-10-11 PROCEDURE — 99214 PR OFFICE/OUTPT VISIT, EST, LEVL IV, 30-39 MIN: ICD-10-PCS | Mod: 25,S$GLB,, | Performed by: PHYSICAL MEDICINE & REHABILITATION

## 2023-10-11 RX ORDER — IBUPROFEN 800 MG/1
800 TABLET ORAL EVERY 8 HOURS PRN
Qty: 30 TABLET | Refills: 0 | Status: SHIPPED | OUTPATIENT
Start: 2023-10-11 | End: 2023-11-16 | Stop reason: SDUPTHER

## 2023-10-11 NOTE — PROGRESS NOTES
Mr. Hodges was scheduled to see me in clinic today.  He was previously seen by me for thoracic and lumbar back pain. He was refererd to Ervin Oglesby ([facility]) who did lumbar and thoracic TPI.  Dr. Oglesby has moved and is no longer practicing in this area , thus hecannot follow up with her.  He presented to me for thoracic tpi as Dr. Oglesby did this with benefit 9-22-22.  He was expecting thoracic TPI again today.  I explained that one of the pain physicians would need to do a thhoracic TPI as I am not comfortable doing it in that region.  Both physicians were in procedures this afternoon and did not have openings the rest of this week.  Mr. Hodges took off of work today and has a busy schedule keeping him from returning later in the week.  Dr. Peace agreed to see him today for TPI.  We did cancel his appointment with me as he had a $170 copay, so this can be returned to him.  I did agree to send in 800mg ibuprofen to his pharmacy to help with pain.  Instructed to follow up with PCP for any refills of this medication in the future.

## 2023-10-11 NOTE — PROGRESS NOTES
[facility]    Consulting Provider: No ref. provider found  PCP: Blas Amaral DO    CHIEF COMPLAINT:   Chief Complaint   Patient presents with    Muscle Pain     TPI       HISTORY OF PRESENT ILLNESS: Carroll Roe is a 58 y.o. male who presents to me for evaluation and management of chronic thoracic pain.     Today reports, chronic mid back pain with noted tender points over the thoracic region. Reports previously seen by Dr. Oglesby for trigger point injections that work well for him. Here today to repeat these injections.     Medications: NSAIDs  - has tried baclofen, flexeril  Injections: trigger point injections to thoracic paraspinals with lidocaine, 09/13/2022    Review of Systems   Constitutional: Negative for fever.   HENT: Negative for drooling.    Eyes: Negative for discharge.   Respiratory: Negative for choking.    Cardiovascular: Negative for chest pain.   Genitourinary: Negative for flank pain.   Skin: Negative for wound.   Allergic/Immunologic: Negative for immunocompromised state.   Neurological: Negative for tremors and syncope.   Psychiatric/Behavioral: Negative for behavioral problems.     Past Medical History:   Past Medical History:   Diagnosis Date    Cataract     OS    Chronic ulcerative enterocolitis with complication     Controlled type 2 diabetes mellitus without complication, without long-term current use of insulin 04/22/2021    Detached retina, right 02/2019    Gout     Hepatomegaly     Hyperlipidemia     Retinal tear, left     Snoring     Ulcerative colitis        Past Surgical History:   Past Surgical History:   Procedure Laterality Date    AIR FLUID EXCHANGE OF EYE  06/12/2019    Procedure: AIR FLUID EXCHANGE, EYE;  Surgeon: LESLYE Pickard MD;  Location: [facility] OR [address] [place] [address];  Service: Ophthalmology;;    CATARACT EXTRACTION W/  INTRAOCULAR LENS IMPLANT Right 12/12/2019    Dr Lopez    COLONOSCOPY  10/15/2020    Dr. Botello     COLONOSCOPY  08/19/2021    Dr. Botello    COLONOSCOPY  04/07/2022    Dr Botello    ENDOLASER PHOTOCOAGULATION  06/12/2019    Procedure: PHOTOCOAGULATION, ENDOLASER;  Surgeon: LESLYE Pickard MD;  Location: [facility] OR [address] [place] [address];  Service: Ophthalmology;;    EYE SURGERY      FRACTURE SURGERY  11/2001    PHACOEMULSIFICATION OF CATARACT Right 12/12/2019    Procedure: PHACOEMULSIFICATION, CATARACT;  Surgeon: Brett Lopez Jr., MD;  Location: [facility] OR;  Service: Ophthalmology;  Laterality: Right;  Right    REMOVAL OF EPIRETINAL MEMBRANE  06/12/2019    Procedure: REMOVAL, EPIRETINAL MEMBRANE;  Surgeon: LESLYE Pickard MD;  Location: [facility] OR [address] [place] [address];  Service: Ophthalmology;;    RETINAL DETACHMENT SURGERY      SHOULDER SURGERY      right    VITRECTOMY BY PARS PLANA APPROACH Right 04/24/2019    Procedure: VITRECTOMY, PARS PLANA APPROACH;  Surgeon: LESLYE Pickard MD;  Location: [facility] OR [address] [place] [address];  Service: Ophthalmology;  Laterality: Right;  25g PPV/EL/Afx/SF6    VITRECTOMY BY PARS PLANA APPROACH Right 06/12/2019    Procedure: 25 g PPV MEMBRANE STRIPPING / EL / C3f8 OD poss oil for RRD with PVR OD;  Surgeon: LESLYE Pickard MD;  Location: [facility] OR [address] [place] [address];  Service: Ophthalmology;  Laterality: Right;  50 min    WRIST SURGERY      left x 3    Yag Capsulotomy Right 02/10/2022    Dr Lopez       Family History:   Family History   Problem Relation Age of Onset    Cataracts Mother     Hyperlipidemia Mother     Macular degeneration Mother     Arthritis Mother     Diabetes Mother     Diabetes Father     Cancer Father     COPD Father     Cataracts Sister     Macular degeneration Sister     Cataracts Brother     Cataracts Paternal Grandmother     Glaucoma Paternal Grandmother     Vision loss Sister     Amblyopia Neg Hx     Blindness Neg Hx     Hypertension Neg Hx     Retinal detachment Neg Hx     Strabismus Neg Hx     Stroke Neg Hx     Thyroid disease Neg Hx        Medications:   Current Outpatient Medications on File  Prior to Visit   Medication Sig Dispense Refill    allopurinoL (ZYLOPRIM) 300 MG tablet Take 1 tablet (300 mg total) by mouth once daily. 90 tablet 4    ascorbic acid, vitamin C, (VITAMIN C) 1000 MG tablet Take 2,000 mg by mouth once daily.      atorvastatin (LIPITOR) 40 MG tablet Take 1 tablet (40 mg total) by mouth nightly. 90 tablet 4    carvediloL (COREG) 12.5 MG tablet Take 1 tablet (12.5 mg total) by mouth 2 (two) times daily. 180 tablet 4    cyclobenzaprine (FLEXERIL) 10 MG tablet Take 10 mg by mouth every 8 (eight) hours as needed.      docosahexaenoic acid/epa (FISH OIL ORAL) Take 1,000 mg by mouth.      ENTYVIO 300 mg SolR injection       glimepiride (AMARYL) 4 MG tablet Take 1 tablet (4 mg total) by mouth before breakfast. 90 tablet 4    hydroCHLOROthiazide (HYDRODIURIL) 25 MG tablet Take 1 tablet (25 mg total) by mouth every morning. 90 tablet 4    ibuprofen (ADVIL,MOTRIN) 800 MG tablet Take 1 tablet (800 mg total) by mouth every 8 (eight) hours as needed for Pain. 30 tablet 0    promethazine (PHENERGAN) 6.25 mg/5 mL syrup Take 5 mLs (6.25 mg total) by mouth every 6 (six) hours as needed (cough - nightly). 118 mL 0    psyllium husk (METAMUCIL ORAL) Take by mouth.      ramipriL (ALTACE) 10 MG capsule Take 1 capsule (10 mg total) by mouth once daily. 90 capsule 4    semaglutide (OZEMPIC) 0.25 mg or 0.5 mg (2 mg/3 mL) pen injector Inject 0.5 mg into the skin every 7 days. 9 mL 0    ubidecarenone (CO Q-10 ORAL) Take 400 mg by mouth.      vitamin D (VITAMIN D3) 1000 units Tab Take 1,000 Units by mouth once daily.       No current facility-administered medications on file prior to visit.       Allergies:   Review of patient's allergies indicates:   Allergen Reactions    Clindamycin Other (See Comments)     Dizziness      Hydromorphone      Other reaction(s): nausea    Rosuvastatin      Other reaction(s): insomnia, insomniaPT TOOK LIPITOR IN PAST AND STATES CAN TAKE.    Uloric [febuxostat]      Heart attack  like symptoms       Social History:   Social History     Socioeconomic History    Marital status:    Tobacco Use    Smoking status: Former     Current packs/day: 0.00     Types: Cigarettes     Quit date:      Years since quittin.7    Smokeless tobacco: Former   Substance and Sexual Activity    Alcohol use: Not Currently     Comment: rare occasions    Drug use: Never    Sexual activity: Yes     Partners: Female     Birth control/protection: See Surgical Hx       PHYSICAL EXAMINATION:   Vitals:    10/11/23 1610   BP: (!) 166/72   BP Location: Left arm   Patient Position: Sitting   BP Method: Large (Automatic)   Pulse: 72     Constitutional: No apparent distress. Pleasant.  HENT: Trachea midline.  Head: Normocephalic and atraumatic.   Eyes: Right eye exhibits no discharge. Left eye exhibits no discharge. No scleral icterus.   CV: Well perfused.   Pulmonary/Chest: Effort normal. No respiratory distress.   Abdominal: There is no guarding.   Neurological: Awake, alert and cooperative.  SKIN: Intact no apparent lesions, cut, ulcers or abrasions  EXT:  No cyanosis, clubbing, or edema.  SENSORY: Intact to light touch in the bilateral lower extemities.  MUSCULOSKELETAL:   Muscle Strength:(0-5)                             Right     Left  Hip Flexors                5  5  Hip Abductor              5  5  Hip Adductor              5  5  Knee Extensors          5  5  Knee Flexors              5  5  Ankle Dorsiflex           5  5  Ankle Planterflex        5  5  EHL                            5  5    Squat and Rise (L3): can perform  Heel Walk (L4,5): can perform  Toe Walk (S1): can perform    Reflexes: (0-4+/4)   Right     Left  Patellar(L4)  2+  2+  Ankle(S1)  2+  2+    INSPECTION:         Right     Left   Localized/Generalized swelling:   -  -  Muscle contours normal and symmetrical: +  +  Erythema:      -  -  Gross deformity:    -  -    GAIT: smooth and symmetrical with equal arm swing    RANGE OF MOTION:            Right      Left  Lumbar Flexion:                    Full  Full   Lumbar Extension:                  Full  Full  Lumbar Lateral bending: Full  Full  Hip Internal Rotation:  Full  Full  Hip External Rotation:  Full  Full  Log Rolling:   Full  Full  Other:     TENDERNESS:                 Right      Left  Iliolumbar ligament:      -  -  Axial lumbar loading:   -  -  Simulated trunk rotation: -  -  Paraspinals: bilateral thoracic paraspinals, noted trigger points to one left and three right over the T6/7 area paraspinals.      SPECIAL TESTS:         Right     Left  Seated SLR:       -  -  Lumbar Phalen's:  -  -  Piriformis stretch:  -  -  Ankle clonus:   -  -  Other:    Imaging  No appropriate imaging to review at this time.     Data Reviewed:      Supportive Actions: Independent visualization of images or test specimens.    ASSESSMENT:   1. Myofascial pain syndrome of thoracic spine    2. Chronic bilateral thoracic back pain    3. Trigger point with back pain        PLAN:   1. Time was spent reviewing the above diagnosis in depth with Carroll today, including acute management and rehabilitation.     2. We discussed options for management of his pain would be to consider injection of local anesthetic. The use, benefits, risks, and expectations of all of these types of injections was discussed at length with him today. At this time, he elects to proceed with palpation guided bilateral thoracic paraspinal trigger point injections with local anesthetic. This procedure was completed in clinic today, please see procedure note for further details.     3. We discussed that he would be more appropriate seeing my colleague, Dr. Hernandez, for further long term management.     RTC 1 month follow up with my colleague for further evaluation and management.     30 minutes of total time spent on the encounter, which includes face to face time and non-face to face time preparing to see the patient (eg, review of tests), obtaining  and/or reviewing separately obtained history, documenting clinical information in the electronic or other health record, independently interpreting results (not separately reported), communicating results to the patient/family/caregiver, and/or care coordination (not separately reported).

## 2023-10-11 NOTE — PROCEDURES
Trigger Point Injection    Performed by: Valentina Peace DO  Authorized by: Valentina Peace,     Thoracic Paraspinal:  Bilateral    Consent Done?:  Yes (Verbal)    Pre-Procedure:   Indications:  Diagnostic evaluation, pain and pain relief  Site marked: the procedure site was marked     Timeout: prior to procedure the correct patient, procedure, and site was verified    Prep: patient was prepped and draped in usual sterile fashion     Local anesthesia used?: Yes    Anesthesia:  Local infiltration  Local anesthetic:  Lidocaine 1% without epinephrine  Anesthetic total (ml):  4      The above was dispersed evening on one left thoracic and three right thoracic paraspinals.     Patient reported no issues with relief in pain at completion.

## 2023-11-16 PROBLEM — E66.813 CLASS 3 SEVERE OBESITY DUE TO EXCESS CALORIES WITH SERIOUS COMORBIDITY AND BODY MASS INDEX (BMI) OF 45.0 TO 49.9 IN ADULT: Status: ACTIVE | Noted: 2023-09-25

## 2023-11-17 ENCOUNTER — OFFICE VISIT (OUTPATIENT)
Dept: PHYSICAL MEDICINE AND REHAB | Facility: CLINIC | Age: 58
End: 2023-11-17
Payer: COMMERCIAL

## 2023-11-17 VITALS
WEIGHT: 304.25 LBS | DIASTOLIC BLOOD PRESSURE: 73 MMHG | HEIGHT: 69 IN | BODY MASS INDEX: 45.06 KG/M2 | SYSTOLIC BLOOD PRESSURE: 140 MMHG | HEART RATE: 72 BPM

## 2023-11-17 DIAGNOSIS — M79.18 BILATERAL MYOFASCIAL PAIN: Primary | ICD-10-CM

## 2023-11-17 PROCEDURE — 4010F ACE/ARB THERAPY RXD/TAKEN: CPT | Mod: CPTII,S$GLB,, | Performed by: STUDENT IN AN ORGANIZED HEALTH CARE EDUCATION/TRAINING PROGRAM

## 2023-11-17 PROCEDURE — 99213 PR OFFICE/OUTPT VISIT, EST, LEVL III, 20-29 MIN: ICD-10-PCS | Mod: 25,S$GLB,, | Performed by: STUDENT IN AN ORGANIZED HEALTH CARE EDUCATION/TRAINING PROGRAM

## 2023-11-17 PROCEDURE — 1159F MED LIST DOCD IN RCRD: CPT | Mod: CPTII,S$GLB,, | Performed by: STUDENT IN AN ORGANIZED HEALTH CARE EDUCATION/TRAINING PROGRAM

## 2023-11-17 PROCEDURE — 3078F PR MOST RECENT DIASTOLIC BLOOD PRESSURE < 80 MM HG: ICD-10-PCS | Mod: CPTII,S$GLB,, | Performed by: STUDENT IN AN ORGANIZED HEALTH CARE EDUCATION/TRAINING PROGRAM

## 2023-11-17 PROCEDURE — 20553 PR INJECT TRIGGER POINTS, > 3: ICD-10-PCS | Mod: S$GLB,,, | Performed by: STUDENT IN AN ORGANIZED HEALTH CARE EDUCATION/TRAINING PROGRAM

## 2023-11-17 PROCEDURE — 3061F NEG MICROALBUMINURIA REV: CPT | Mod: CPTII,S$GLB,, | Performed by: STUDENT IN AN ORGANIZED HEALTH CARE EDUCATION/TRAINING PROGRAM

## 2023-11-17 PROCEDURE — 3061F PR NEG MICROALBUMINURIA RESULT DOCUMENTED/REVIEW: ICD-10-PCS | Mod: CPTII,S$GLB,, | Performed by: STUDENT IN AN ORGANIZED HEALTH CARE EDUCATION/TRAINING PROGRAM

## 2023-11-17 PROCEDURE — 99999 PR PBB SHADOW E&M-EST. PATIENT-LVL III: CPT | Mod: PBBFAC,,, | Performed by: STUDENT IN AN ORGANIZED HEALTH CARE EDUCATION/TRAINING PROGRAM

## 2023-11-17 PROCEDURE — 3066F PR DOCUMENTATION OF TREATMENT FOR NEPHROPATHY: ICD-10-PCS | Mod: CPTII,S$GLB,, | Performed by: STUDENT IN AN ORGANIZED HEALTH CARE EDUCATION/TRAINING PROGRAM

## 2023-11-17 PROCEDURE — 4010F PR ACE/ARB THEARPY RXD/TAKEN: ICD-10-PCS | Mod: CPTII,S$GLB,, | Performed by: STUDENT IN AN ORGANIZED HEALTH CARE EDUCATION/TRAINING PROGRAM

## 2023-11-17 PROCEDURE — 99213 OFFICE O/P EST LOW 20 MIN: CPT | Mod: 25,S$GLB,, | Performed by: STUDENT IN AN ORGANIZED HEALTH CARE EDUCATION/TRAINING PROGRAM

## 2023-11-17 PROCEDURE — 3008F BODY MASS INDEX DOCD: CPT | Mod: CPTII,S$GLB,, | Performed by: STUDENT IN AN ORGANIZED HEALTH CARE EDUCATION/TRAINING PROGRAM

## 2023-11-17 PROCEDURE — 3008F PR BODY MASS INDEX (BMI) DOCUMENTED: ICD-10-PCS | Mod: CPTII,S$GLB,, | Performed by: STUDENT IN AN ORGANIZED HEALTH CARE EDUCATION/TRAINING PROGRAM

## 2023-11-17 PROCEDURE — 3078F DIAST BP <80 MM HG: CPT | Mod: CPTII,S$GLB,, | Performed by: STUDENT IN AN ORGANIZED HEALTH CARE EDUCATION/TRAINING PROGRAM

## 2023-11-17 PROCEDURE — 99999 PR PBB SHADOW E&M-EST. PATIENT-LVL III: ICD-10-PCS | Mod: PBBFAC,,, | Performed by: STUDENT IN AN ORGANIZED HEALTH CARE EDUCATION/TRAINING PROGRAM

## 2023-11-17 PROCEDURE — 3051F HG A1C>EQUAL 7.0%<8.0%: CPT | Mod: CPTII,S$GLB,, | Performed by: STUDENT IN AN ORGANIZED HEALTH CARE EDUCATION/TRAINING PROGRAM

## 2023-11-17 PROCEDURE — 3077F PR MOST RECENT SYSTOLIC BLOOD PRESSURE >= 140 MM HG: ICD-10-PCS | Mod: CPTII,S$GLB,, | Performed by: STUDENT IN AN ORGANIZED HEALTH CARE EDUCATION/TRAINING PROGRAM

## 2023-11-17 PROCEDURE — 20553 NJX 1/MLT TRIGGER POINTS 3/>: CPT | Mod: S$GLB,,, | Performed by: STUDENT IN AN ORGANIZED HEALTH CARE EDUCATION/TRAINING PROGRAM

## 2023-11-17 PROCEDURE — 1159F PR MEDICATION LIST DOCUMENTED IN MEDICAL RECORD: ICD-10-PCS | Mod: CPTII,S$GLB,, | Performed by: STUDENT IN AN ORGANIZED HEALTH CARE EDUCATION/TRAINING PROGRAM

## 2023-11-17 PROCEDURE — 3051F PR MOST RECENT HEMOGLOBIN A1C LEVEL 7.0 - < 8.0%: ICD-10-PCS | Mod: CPTII,S$GLB,, | Performed by: STUDENT IN AN ORGANIZED HEALTH CARE EDUCATION/TRAINING PROGRAM

## 2023-11-17 PROCEDURE — 3066F NEPHROPATHY DOC TX: CPT | Mod: CPTII,S$GLB,, | Performed by: STUDENT IN AN ORGANIZED HEALTH CARE EDUCATION/TRAINING PROGRAM

## 2023-11-17 PROCEDURE — 3077F SYST BP >= 140 MM HG: CPT | Mod: CPTII,S$GLB,, | Performed by: STUDENT IN AN ORGANIZED HEALTH CARE EDUCATION/TRAINING PROGRAM

## 2023-11-17 NOTE — PROGRESS NOTES
PHYSICAL MEDICINE AND REHABILITATION  Established patient f/u:    Subjective:   Chief Complaint:    Chief Complaint   Patient presents with    Back Pain     C/o pain in middle back        HPI: Carroll Roe is a 58 y.o. male with  has a past medical history of Controlled type 2 diabetes mellitus without complication, without long-term current use of insulin (04/22/2021), Gout, Hepatomegaly, Hyperlipidemia, Retinal tear, left, Snoring, and Ulcerative colitis. She was sent to me for consultation for Back Pain (C/o pain in middle back )   Today,  he returns for follow up.  His last clinic visit was on 10/11/2023.  At this visit he underwent bilateral thoracic paraspinal trigger point injections with lidocaine with palpation guidance.  Previously he had these performed on 09/13/2022.  He recalls excellent relief with these injections in the past and elected to have a repeat on 10/11/2023.  He recalls good tolerance to injections.  He continues to take ibuprofen and baclofen and these were recently refilled by his primary care provider Dr. Amaral.  He takes baclofen 10 mg nightly as needed and ibuprofen 800 mg b.i.d. as needed. He reports only a couple of hours of relief with his most recent TPIs. He has been to PT in the past without relief.This was with dry needling.     Review of Systems  Denies    Imaging/Diagnostic Studies  None available      Past Medical History:   Diagnosis Date    Controlled type 2 diabetes mellitus without complication, without long-term current use of insulin 04/22/2021    Gout     Hepatomegaly     Hyperlipidemia     Retinal tear, left     Snoring     Ulcerative colitis        Past Surgical History:   Procedure Laterality Date    AIR FLUID EXCHANGE OF EYE  06/12/2019    Procedure: AIR FLUID EXCHANGE, EYE;  Surgeon: LESLYE Pickard MD;  Location: [facility] OR [address] [place] [address];  Service: Ophthalmology;;    CATARACT EXTRACTION W/  INTRAOCULAR LENS IMPLANT Right 12/12/2019    Dr John THURMAN   10/15/2020    Dr. Botello    COLONOSCOPY  08/19/2021    Dr. Botello    COLONOSCOPY  04/07/2022    Dr Botello    ENDOLASER PHOTOCOAGULATION  06/12/2019    Procedure: PHOTOCOAGULATION, ENDOLASER;  Surgeon: LESLYE Pickard MD;  Location: [facility] OR [address] [place] [address];  Service: Ophthalmology;;    EYE SURGERY      FRACTURE SURGERY  11/2001    PHACOEMULSIFICATION OF CATARACT Right 12/12/2019    Procedure: PHACOEMULSIFICATION, CATARACT;  Surgeon: Brett Lopez Jr., MD;  Location: [facility] OR;  Service: Ophthalmology;  Laterality: Right;  Right    REMOVAL OF EPIRETINAL MEMBRANE  06/12/2019    Procedure: REMOVAL, EPIRETINAL MEMBRANE;  Surgeon: LESLYE Pickard MD;  Location: [facility] OR [address] [place] [address];  Service: Ophthalmology;;    RETINAL DETACHMENT SURGERY      SHOULDER SURGERY      right    VITRECTOMY BY PARS PLANA APPROACH Right 04/24/2019    Procedure: VITRECTOMY, PARS PLANA APPROACH;  Surgeon: LESLYE Pickard MD;  Location: [facility] OR [address] [place] [address];  Service: Ophthalmology;  Laterality: Right;  25g PPV/EL/Afx/SF6    VITRECTOMY BY PARS PLANA APPROACH Right 06/12/2019    Procedure: 25 g PPV MEMBRANE STRIPPING / EL / C3f8 OD poss oil for RRD with PVR OD;  Surgeon: LESLYE Pickard MD;  Location: [address];  Service: Ophthalmology;  Laterality: Right;  50 min    WRIST SURGERY      left x 3    Yag Capsulotomy Right 02/10/2022    Dr Lopez       Review of patient's allergies indicates:   Allergen Reactions    Clindamycin Other (See Comments)     Dizziness      Hydromorphone      Other reaction(s): nausea    Rosuvastatin      Other reaction(s): insomnia, insomniaPT TOOK LIPITOR IN PAST AND STATES CAN TAKE.    Uloric [febuxostat]      Heart attack like symptoms       Current Outpatient Medications   Medication Sig Dispense Refill    allopurinoL (ZYLOPRIM) 300 MG tablet Take 1 tablet (300 mg total) by mouth once daily. 90 tablet 4    ascorbic acid, vitamin C, (VITAMIN C) 1000 MG tablet Take 2,000 mg by mouth once daily.       atorvastatin (LIPITOR) 40 MG tablet TAKE 1 TABLET(40 MG) BY MOUTH EVERY NIGHT 90 tablet 3    baclofen (LIORESAL) 10 MG tablet Take 1 tablet (10 mg total) by mouth nightly as needed (back pain.). 90 tablet 2    carvediloL (COREG) 12.5 MG tablet Take 1 tablet (12.5 mg total) by mouth 2 (two) times daily. 180 tablet 4    cyclobenzaprine (FLEXERIL) 10 MG tablet Take 10 mg by mouth every 8 (eight) hours as needed.      docosahexaenoic acid/epa (FISH OIL ORAL) Take 1,000 mg by mouth.      ENTYVIO 300 mg SolR injection       glimepiride (AMARYL) 4 MG tablet Take 1 tablet (4 mg total) by mouth before breakfast. 90 tablet 4    hydroCHLOROthiazide (HYDRODIURIL) 25 MG tablet Take 1 tablet (25 mg total) by mouth every morning. 90 tablet 4    ibuprofen (ADVIL,MOTRIN) 800 MG tablet Take 1 tablet (800 mg total) by mouth every 12 (twelve) hours as needed for Pain (for back pain.  Take with food.). 90 tablet 2    psyllium husk (METAMUCIL ORAL) Take by mouth.      ramipriL (ALTACE) 10 MG capsule Take 1 capsule (10 mg total) by mouth once daily. 90 capsule 4    ubidecarenone (CO Q-10 ORAL) Take 400 mg by mouth.      vitamin D (VITAMIN D3) 1000 units Tab Take 1,000 Units by mouth once daily.       No current facility-administered medications for this visit.       Family History   Problem Relation Age of Onset    Cataracts Mother     Hyperlipidemia Mother     Macular degeneration Mother     Arthritis Mother     Diabetes Mother     Diabetes Father     Cancer Father     COPD Father     Cataracts Sister     Macular degeneration Sister     Cataracts Brother     Cataracts Paternal Grandmother     Glaucoma Paternal Grandmother     Vision loss Sister     Amblyopia Neg Hx     Blindness Neg Hx     Hypertension Neg Hx     Retinal detachment Neg Hx     Strabismus Neg Hx     Stroke Neg Hx     Thyroid disease Neg Hx        Social History     Socioeconomic History    Marital status:    Tobacco Use    Smoking status: Former     Current  "packs/day: 0.00     Types: Cigarettes     Quit date:      Years since quittin.8    Smokeless tobacco: Former   Substance and Sexual Activity    Alcohol use: Not Currently     Comment: rare occasions    Drug use: Never    Sexual activity: Yes     Partners: Female     Birth control/protection: See Surgical Hx         Objective:    BP (!) 140/73   Pulse 72   Ht 5' 9.02" (1.753 m)   Wt (!) 138 kg (304 lb 3.8 oz)   BMI 44.90 kg/m²   Physical Exam  Constitutional:       Appearance: Normal appearance.   HENT:      Head: Normocephalic and atraumatic.   Eyes:      Extraocular Movements: Extraocular movements intact.   Cardiovascular:      Rate and Rhythm: Normal rate.   Pulmonary:      Effort: Pulmonary effort is normal.   Abdominal:      General: Abdomen is flat.   Musculoskeletal:         General: Normal range of motion.   Skin:     General: Skin is warm and dry.   Neurological:      General: No focal deficit present.      Mental Status: He is alert and oriented to person, place, and time. Mental status is at baseline.   Psychiatric:         Mood and Affect: Mood normal.         Behavior: Behavior normal.         Thought Content: Thought content normal.        Back Exam     Tenderness   The patient is experiencing tenderness in the thoracic.               Assessment:       ICD-10-CM ICD-9-CM    1. Bilateral myofascial pain  M79.18 729.1 Trigger Point Injection            Plan:   1. Bilateral myofascial pain  Assessment & Plan:  Primarily on the right thoracic paraspinals.  He recalls approximately 2 hours of relief with previous trigger point injections and would like to have a repeat.  Repeat T PIs with needle fenestration x3.  Verbal consent obtained.  See procedure note for additional details.  He has attempted physical therapy with dry needling without significant relief of his pain.  Continue ibuprofen 800 mg b.i.d. p.r.n. as prescribed by PCP   Continue baclofen 10 mg nightly as needed.  He reports no " issues with insomnia related to his pain   Return to clinic p.r.n.    Orders:  -     Trigger Point Injection       A trigger point injection was performed at the site of maximal tenderness along the right thoracic paraspinal musculature using 1% plain Lidocaine. This was well tolerated, and followed by relief of pain.      Hector Hernandez MD  Physical Medicine & Rehabilitation     Disclaimer:  This note may have been prepared using voice recognition software, it may have not been extensively proofed, as such there could be errors within the text such as sound alike errors.  Contact the author of this note for clarification.

## 2023-11-17 NOTE — ASSESSMENT & PLAN NOTE
Primarily on the right thoracic paraspinals.  He recalls approximately 2 hours of relief with previous trigger point injections and would like to have a repeat.  Repeat T PIs with needle fenestration x3.  Verbal consent obtained.  See procedure note for additional details.  He has attempted physical therapy with dry needling without significant relief of his pain.  Continue ibuprofen 800 mg b.i.d. p.r.n. as prescribed by PCP   Continue baclofen 10 mg nightly as needed.  He reports no issues with insomnia related to his pain   Return to clinic p.r.n.

## 2023-11-17 NOTE — PROCEDURES
Trigger Point Injection    Performed by: Hector Hernandez MD  Authorized by: Hector Hernandez MD    Thoracic Paraspinal:  Right    Consent Done?:  Yes (Verbal)    Pre-Procedure:   Indications:  Pain and pain relief  Site marked: the procedure site was marked     Timeout: prior to procedure the correct patient, procedure, and site was verified      Local anesthesia used?: Yes    Anesthesia:  Local infiltration  Local anesthetic:  Lidocaine 1% without epinephrine

## 2023-12-25 PROBLEM — Z00.00 ANNUAL PHYSICAL EXAM: Status: RESOLVED | Noted: 2022-07-28 | Resolved: 2023-12-25

## 2024-01-09 ENCOUNTER — OFFICE VISIT (OUTPATIENT)
Dept: UROLOGY | Facility: CLINIC | Age: 59
End: 2024-01-09
Payer: COMMERCIAL

## 2024-01-09 VITALS — WEIGHT: 306 LBS | BODY MASS INDEX: 45.32 KG/M2 | HEIGHT: 69 IN

## 2024-01-09 DIAGNOSIS — N40.0 BPH WITHOUT URINARY OBSTRUCTION: ICD-10-CM

## 2024-01-09 DIAGNOSIS — R97.20 ELEVATED PSA: Primary | ICD-10-CM

## 2024-01-09 LAB
BILIRUBIN, UA POC OHS: NEGATIVE
BLOOD, UA POC OHS: NEGATIVE
CLARITY, UA POC OHS: CLEAR
COLOR, UA POC OHS: YELLOW
GLUCOSE, UA POC OHS: >=1000
KETONES, UA POC OHS: NEGATIVE
LEUKOCYTES, UA POC OHS: NEGATIVE
NITRITE, UA POC OHS: NEGATIVE
PH, UA POC OHS: 5.5
PROTEIN, UA POC OHS: NEGATIVE
SPECIFIC GRAVITY, UA POC OHS: 1.01
UROBILINOGEN, UA POC OHS: 0.2

## 2024-01-09 PROCEDURE — 99999 PR PBB SHADOW E&M-EST. PATIENT-LVL IV: CPT | Mod: PBBFAC,,, | Performed by: STUDENT IN AN ORGANIZED HEALTH CARE EDUCATION/TRAINING PROGRAM

## 2024-01-09 PROCEDURE — 99204 OFFICE O/P NEW MOD 45 MIN: CPT | Mod: S$GLB,,, | Performed by: STUDENT IN AN ORGANIZED HEALTH CARE EDUCATION/TRAINING PROGRAM

## 2024-01-09 PROCEDURE — 1159F MED LIST DOCD IN RCRD: CPT | Mod: CPTII,S$GLB,, | Performed by: STUDENT IN AN ORGANIZED HEALTH CARE EDUCATION/TRAINING PROGRAM

## 2024-01-09 PROCEDURE — 3008F BODY MASS INDEX DOCD: CPT | Mod: CPTII,S$GLB,, | Performed by: STUDENT IN AN ORGANIZED HEALTH CARE EDUCATION/TRAINING PROGRAM

## 2024-01-09 PROCEDURE — 81003 URINALYSIS AUTO W/O SCOPE: CPT | Mod: QW,S$GLB,, | Performed by: STUDENT IN AN ORGANIZED HEALTH CARE EDUCATION/TRAINING PROGRAM

## 2024-01-09 PROCEDURE — 1160F RVW MEDS BY RX/DR IN RCRD: CPT | Mod: CPTII,S$GLB,, | Performed by: STUDENT IN AN ORGANIZED HEALTH CARE EDUCATION/TRAINING PROGRAM

## 2024-01-09 NOTE — PROGRESS NOTES
"[facility] - Urology   Clinic Note    Subjective:     Chief Complaint: Elevated PSA      History of Present Illness:  Carroll Roe is a 58 y.o. male who presents to clinic for evaluation and management of an elevated PSA. He is new to our clinic referred by Dr. Blas Amaral    He had an elevated PSA. An MRI prostate was obtained showing only PI-RADS 2 lesion with no discrete nodularity. Volume was 106 cc. He has no known family history of prostate cancer   PSAD - 0.047  No significant LUTS at baseline. Good stream. No ED    He has ulcerative colitis. No previous abdomina surgeries.      Prostate Specific Antigen   Latest Ref Rng 0.00 - 4.00 ng/mL   7/27/2022 5.5 (H)    9/21/2023 8.5 (H)    11/3/2023 5.0 (H)      Past medical, family, surgical and social history reviewed as documented in chart with pertinent positive medical, family, surgical and social history detailed in HPI.    A review systems was conducted with pertinent positive and negative findings documented in HPI.    Objective:     Estimated body mass index is 45.16 kg/m² as calculated from the following:    Height as of this encounter: 5' 9.02" (1.753 m).    Weight as of this encounter: 138.8 kg (306 lb).    Vital Signs (Most Recent)       Physical Exam  Constitutional:       General: He is not in acute distress.     Appearance: He is not ill-appearing or toxic-appearing.   Pulmonary:      Effort: Pulmonary effort is normal. No accessory muscle usage or respiratory distress.   Genitourinary:     Comments: Enlarged, no nodules  Neurological:      Mental Status: He is alert.         Labs reviewed below:  Lab Results   Component Value Date    BUN 16 11/03/2023    CREATININE 0.65 11/03/2023    WBC 6.16 01/13/2023    HGB 14.4 01/13/2023    HCT 45.9 01/13/2023     01/13/2023    AST 43 11/03/2023    ALT 49 11/03/2023    ALKPHOS 77 11/03/2023    ALBUMIN 4.0 11/03/2023    HGBA1C 7.3 (H) 11/03/2023        PSA trend reviewed below:  Lab Results "   Component Value Date    PSA 5.0 (H) 11/03/2023    PSA 8.5 (H) 09/21/2023    PSA 5.5 (H) 07/27/2022    PSA 3.1 04/22/2021    PSA 1.8 08/28/2017      Urine dipstick today was negative for blood, leukocytes, and nitrites.     Assessment:     1. Elevated PSA    2. BPH without urinary obstruction      Plan:     PSA values were discussed. PSA can be elevated in both benign and malignant conditions. We discussed the most common differential diagnosis of an elevated PSA including BPH, prostatitis (chronic and acute), and prostate cancer. The issues of sensitivity, specificity and the predictive values of PSA were discussed in detail. We discussed the benefits and limitations of PSA testing/screening, and the utility of PSA screening, specifically in identifying patients at risk for a significant prostate cancer.    We discussed options including further evaluation or prostate needle biopsy    Plan for Exo Dx  PSA total and free in 6 months  Monitor the BPH as he is asymptomatic     Mateus Brown MD

## 2024-03-15 ENCOUNTER — TELEPHONE (OUTPATIENT)
Dept: PHYSICAL MEDICINE AND REHAB | Facility: CLINIC | Age: 59
End: 2024-03-15
Payer: COMMERCIAL

## 2024-03-15 NOTE — TELEPHONE ENCOUNTER
Called pt back and let him know we did not have availability to get him in today for an Injection. He was asking to get in Tuesday so I advised him that Dr. Hernandez was booked up this week in [facility] and so was Dr. Peace. Pt was extremely unhappy and stated he was going to call [facility] [place] to get another provider.     ----- Message from Yamilet Severino MA sent at 3/15/2024 12:23 PM CDT -----  Regarding: FW: advice  Contact: patient    ----- Message -----  From: Werner Rowland  Sent: 3/15/2024  11:24 AM CDT  To: Valentina Peace Staff  Subject: advice                                           Type: Needs Medical Advice  Who Called:  patient   Symptoms (please be specific):    How long has patient had these symptoms:    Pharmacy name and phone #:    Best Call Back Number: [phone]  Additional Information: Pt would like to know if he can get in today to get an injection in back. Please call to advice. Thanks

## 2024-03-21 ENCOUNTER — LAB VISIT (OUTPATIENT)
Dept: LAB | Facility: HOSPITAL | Age: 59
End: 2024-03-21
Attending: STUDENT IN AN ORGANIZED HEALTH CARE EDUCATION/TRAINING PROGRAM
Payer: COMMERCIAL

## 2024-03-21 DIAGNOSIS — R97.20 ELEVATED PSA: ICD-10-CM

## 2024-03-21 LAB
PROSTATE SPECIFIC ANTIGEN, TOTAL: 4.4 NG/ML (ref 0–4)
PSA FREE MFR SERPL: 19.55 %
PSA FREE SERPL-MCNC: 0.86 NG/ML (ref 0–1.5)

## 2024-03-21 PROCEDURE — 84154 ASSAY OF PSA FREE: CPT | Performed by: STUDENT IN AN ORGANIZED HEALTH CARE EDUCATION/TRAINING PROGRAM

## 2024-03-21 PROCEDURE — 36415 COLL VENOUS BLD VENIPUNCTURE: CPT | Mod: PO | Performed by: STUDENT IN AN ORGANIZED HEALTH CARE EDUCATION/TRAINING PROGRAM

## 2024-03-26 ENCOUNTER — OFFICE VISIT (OUTPATIENT)
Dept: UROLOGY | Facility: CLINIC | Age: 59
End: 2024-03-26
Payer: COMMERCIAL

## 2024-03-26 VITALS — BODY MASS INDEX: 44.87 KG/M2 | WEIGHT: 302.94 LBS | HEIGHT: 69 IN

## 2024-03-26 DIAGNOSIS — N40.0 BPH WITHOUT URINARY OBSTRUCTION: ICD-10-CM

## 2024-03-26 DIAGNOSIS — R97.20 ELEVATED PSA: Primary | ICD-10-CM

## 2024-03-26 PROCEDURE — 4010F ACE/ARB THERAPY RXD/TAKEN: CPT | Mod: CPTII,S$GLB,, | Performed by: STUDENT IN AN ORGANIZED HEALTH CARE EDUCATION/TRAINING PROGRAM

## 2024-03-26 PROCEDURE — 1159F MED LIST DOCD IN RCRD: CPT | Mod: CPTII,S$GLB,, | Performed by: STUDENT IN AN ORGANIZED HEALTH CARE EDUCATION/TRAINING PROGRAM

## 2024-03-26 PROCEDURE — 99214 OFFICE O/P EST MOD 30 MIN: CPT | Mod: S$GLB,,, | Performed by: STUDENT IN AN ORGANIZED HEALTH CARE EDUCATION/TRAINING PROGRAM

## 2024-03-26 PROCEDURE — 99999 PR PBB SHADOW E&M-EST. PATIENT-LVL III: CPT | Mod: PBBFAC,,, | Performed by: STUDENT IN AN ORGANIZED HEALTH CARE EDUCATION/TRAINING PROGRAM

## 2024-03-26 PROCEDURE — 3008F BODY MASS INDEX DOCD: CPT | Mod: CPTII,S$GLB,, | Performed by: STUDENT IN AN ORGANIZED HEALTH CARE EDUCATION/TRAINING PROGRAM

## 2024-03-26 NOTE — PROGRESS NOTES
"[facility] - [facility]rology   Clinic Note    Subjective:     Chief Complaint: Elevated PSA      History of Present Illness:  Carroll Roe is a 59 y.o. male who presents to clinic for evaluation and management of an elevated PSA. He is new to our clinic referred by No ref. provider found    He has an elevated PSA. An MRI prostate was obtained showing only PI-RADS 2 lesion with no discrete nodularity. Volume was 106 cc. He has no known family history of prostate cancer   PSAD - 0.041. ExoDx score 29.34, above cutoff  No significant LUTS at baseline. Good stream. IPSS is 4/2. No ED    He has ulcerative colitis. No previous abdominal surgeries.      PSA   Latest Ref Rng 0.00 - 4.00 ng/mL   4/22/2021 3.1    7/27/2022 5.5 (H)    9/21/2023 8.5 (H)    11/3/2023 5.0 (H)    3/21/2024 4.4 (H) 19.55% free      Past medical, family, surgical and social history reviewed as documented in chart with pertinent positive medical, family, surgical and social history detailed in HPI.    A review systems was conducted with pertinent positive and negative findings documented in HPI.    Objective:     Estimated body mass index is 44.73 kg/m² as calculated from the following:    Height as of this encounter: 5' 9" (1.753 m).    Weight as of this encounter: 137.4 kg (302 lb 14.6 oz).    Vital Signs (Most Recent)       Physical Exam  Constitutional:       General: He is not in acute distress.     Appearance: He is not ill-appearing or toxic-appearing.   Pulmonary:      Effort: Pulmonary effort is normal. No accessory muscle usage or respiratory distress.   Neurological:      Mental Status: He is alert.       Labs reviewed below:  Lab Results   Component Value Date    BUN 16 11/03/2023    CREATININE 0.65 11/03/2023    WBC 6.16 01/13/2023    HGB 14.4 01/13/2023    HCT 45.9 01/13/2023     01/13/2023    AST 43 11/03/2023    ALT 49 11/03/2023    ALKPHOS 77 11/03/2023    ALBUMIN 4.0 11/03/2023    HGBA1C 7.3 (H) 11/03/2023        PSA trend reviewed " below:  Lab Results   Component Value Date    PSA 5.0 (H) 11/03/2023    PSA 8.5 (H) 09/21/2023    PSA 5.5 (H) 07/27/2022    PSA 3.1 04/22/2021    PSA 1.8 08/28/2017    PSAFREE 0.86 03/21/2024    PSAFREEPCT 19.55 03/21/2024      Urine dipstick today was negative for blood, leukocytes, and nitrites.     Assessment:     1. Elevated PSA    2. BPH without urinary obstruction      Plan:     PSA values were discussed. PSA can be elevated in both benign and malignant conditions. We discussed the most common differential diagnosis of an elevated PSA including BPH, prostatitis (chronic and acute), and prostate cancer. The issues of sensitivity, specificity and the predictive values of PSA were discussed in detail. We discussed the benefits and limitations of PSA testing/screening, and the utility of PSA screening, specifically in identifying patients at risk for a significant prostate cancer.    We discussed options including further evaluation or prostate needle biopsy  Plan for biopsy. We discussed the indication, risks, benefits, expectations and alternatives of the procedure. He was given the opportunity to ask questions and all these questions were all answered to his satisfaction.      Mateus Brown MD

## 2024-03-27 ENCOUNTER — TELEPHONE (OUTPATIENT)
Dept: UROLOGY | Facility: CLINIC | Age: 59
End: 2024-03-27
Payer: COMMERCIAL

## 2024-03-27 DIAGNOSIS — R97.20 ELEVATED PSA: Primary | ICD-10-CM

## 2024-04-11 ENCOUNTER — TELEPHONE (OUTPATIENT)
Dept: UROLOGY | Facility: CLINIC | Age: 59
End: 2024-04-11
Payer: COMMERCIAL

## 2024-04-11 DIAGNOSIS — R97.20 ELEVATED PSA: Primary | ICD-10-CM

## 2024-07-29 DIAGNOSIS — M79.672 PAIN IN BOTH FEET: Primary | ICD-10-CM

## 2024-07-29 DIAGNOSIS — M79.671 PAIN IN BOTH FEET: Primary | ICD-10-CM

## 2024-07-30 ENCOUNTER — OFFICE VISIT (OUTPATIENT)
Dept: ORTHOPEDICS | Facility: CLINIC | Age: 59
End: 2024-07-30
Payer: COMMERCIAL

## 2024-07-30 ENCOUNTER — HOSPITAL ENCOUNTER (OUTPATIENT)
Dept: RADIOLOGY | Facility: HOSPITAL | Age: 59
Discharge: HOME | End: 2024-07-30
Attending: ORTHOPAEDIC SURGERY
Payer: COMMERCIAL

## 2024-07-30 DIAGNOSIS — M79.672 PAIN IN BOTH FEET: ICD-10-CM

## 2024-07-30 DIAGNOSIS — M76.62 INSERTIONAL TENDINOPATHY OF LEFT ACHILLES TENDON: ICD-10-CM

## 2024-07-30 DIAGNOSIS — M76.61 INSERTIONAL TENDINOPATHY OF RIGHT ACHILLES TENDON: ICD-10-CM

## 2024-07-30 DIAGNOSIS — M62.89 TIGHTNESS OF BOTH GASTROCNEMIUS MUSCLES: Primary | ICD-10-CM

## 2024-07-30 DIAGNOSIS — M79.671 PAIN IN BOTH FEET: ICD-10-CM

## 2024-07-30 PROCEDURE — 99203 OFFICE O/P NEW LOW 30 MIN: CPT | Mod: S$GLB,,, | Performed by: ORTHOPAEDIC SURGERY

## 2024-07-30 PROCEDURE — 1159F MED LIST DOCD IN RCRD: CPT | Mod: CPTII,S$GLB,, | Performed by: ORTHOPAEDIC SURGERY

## 2024-07-30 PROCEDURE — 4010F ACE/ARB THERAPY RXD/TAKEN: CPT | Mod: CPTII,S$GLB,, | Performed by: ORTHOPAEDIC SURGERY

## 2024-07-30 PROCEDURE — 99999 PR PBB SHADOW E&M-EST. PATIENT-LVL III: CPT | Mod: PBBFAC,,, | Performed by: ORTHOPAEDIC SURGERY

## 2024-07-30 PROCEDURE — 73630 X-RAY EXAM OF FOOT: CPT | Mod: TC,50,PO

## 2024-07-30 PROCEDURE — 3051F HG A1C>EQUAL 7.0%<8.0%: CPT | Mod: CPTII,S$GLB,, | Performed by: ORTHOPAEDIC SURGERY

## 2024-07-30 PROCEDURE — 73610 X-RAY EXAM OF ANKLE: CPT | Mod: TC,50,PO

## 2024-07-30 PROCEDURE — 1160F RVW MEDS BY RX/DR IN RCRD: CPT | Mod: CPTII,S$GLB,, | Performed by: ORTHOPAEDIC SURGERY

## 2024-07-30 NOTE — LETTER
July 30, 2024      [facility]  [address] [place] [address]  Phone: [phone]       Patient: Carroll Roe   YOB: 1965  Date of Visit: 07/30/2024    To Whom It May Concern:    Dawit Roe  was at [facility] on 07/30/2024. The patient may return to work on 08/01/2024 with restrictions. He is not allowed to push/pull, lift/carry or climb until finished physical therapy. If you have any questions or concerns, or if I can be of further assistance, please do not hesitate to contact me.    Sincerely,    Diomedes Leigh M.D.

## 2024-07-30 NOTE — PROGRESS NOTES
Status/Diagnosis: Bilateral gastroc contracture; Left>Right AICT.  Date of Surgery: none  Date of Injury: none; April 2024  Return visit: PRN  X-rays on Return: pending patient complaint    Chief Complaint:   Chief Complaint   Patient presents with    Right Foot - Pain    Left Foot - Pain     Present History:  Patient presents today via referral from [facility] Self   Carroll Roe is a 59 y.o. male who presents today for new patient evaluation.  Endorses a several month history of severe bilateral, left worse than right, posterior heel pain.    No known history of injury or other inciting event.    Patient has had plantar fasciitis in the distant past.  Underwent physical therapy 10+ years ago.  Pain eventually resolved on its own.    For new onset posterior heel pain, patient has been self treating.  Currently taking ibuprofen 800 mg twice daily for approximately 1 year.  Also tried topical Voltaren gel the area of interest with mild relief.  No recent history of boot/brace immobilization, physical therapy, corticosteroid injection.    Patient has received injection by Dr. Chou in the past but unable to say if this was performed at the plantar or posterior heel.    Denies any numbness or tingling.  Past medical history significant for type 2 diabetes, A1c of 7.1; gout; hypertension; obesity.  Patient does vape regularly.  Works on his feet for 8-10 hour shifts at [employer].      Past Medical History:   Diagnosis Date    Arthritis     Controlled type 2 diabetes mellitus without complication, without long-term current use of insulin 04/22/2021    General anesthetics causing adverse effect in therapeutic use     Gout     Hepatomegaly     Hyperlipidemia     Hypertension     Retinal tear, left     Sleep apnea     Snoring     Ulcerative colitis        Past Surgical History:   Procedure Laterality Date    AIR FLUID EXCHANGE OF EYE  06/12/2019    Procedure: AIR FLUID EXCHANGE, EYE;  Surgeon: LESLYE Bautista  MD Melly;  Location: [facility] OR [address] [place] [address];  Service: Ophthalmology;;    CATARACT EXTRACTION W/  INTRAOCULAR LENS IMPLANT Right 12/12/2019    Dr Lopez    COLONOSCOPY  10/15/2020    Dr. Botello    COLONOSCOPY  08/19/2021    Dr. Botello    COLONOSCOPY  04/07/2022    Dr Botello    ENDOLASER PHOTOCOAGULATION  06/12/2019    Procedure: PHOTOCOAGULATION, ENDOLASER;  Surgeon: LESLYE Pickard MD;  Location: [facility] OR [address] [place] [address];  Service: Ophthalmology;;    EYE SURGERY      FRACTURE SURGERY  11/2001    PHACOEMULSIFICATION OF CATARACT Right 12/12/2019    Procedure: PHACOEMULSIFICATION, CATARACT;  Surgeon: Brett Lopez Jr., MD;  Location: [facility] OR;  Service: Ophthalmology;  Laterality: Right;  Right    REMOVAL OF EPIRETINAL MEMBRANE  06/12/2019    Procedure: REMOVAL, EPIRETINAL MEMBRANE;  Surgeon: LESLYE Pickard MD;  Location: [facility] OR [address] [place] [address];  Service: Ophthalmology;;    RETINAL DETACHMENT SURGERY      SHOULDER SURGERY      right    TRANSRECTAL BIOPSY OF PROSTATE WITH ULTRASOUND GUIDANCE N/A 4/5/2024    Procedure: BIOPSY, PROSTATE, RECTAL APPROACH, WITH US GUIDANCE;  Surgeon: Mateus Brown MD;  Location: [facility];  Service: Urology;  Laterality: N/A;    VITRECTOMY BY PARS PLANA APPROACH Right 04/24/2019    Procedure: VITRECTOMY, PARS PLANA APPROACH;  Surgeon: LESLYE Pickard MD;  Location: [address];  Service: Ophthalmology;  Laterality: Right;  25g PPV/EL/Afx/SF6    VITRECTOMY BY PARS PLANA APPROACH Right 06/12/2019    Procedure: 25 g PPV MEMBRANE STRIPPING / EL / [id number] OD poss oil for RRD with PVR OD;  Surgeon: LESLYE Pickard MD;  Location: [facility] OR [address] [place] [address];  Service: Ophthalmology;  Laterality: Right;  50 min    WRIST SURGERY      left x 3    Yag Capsulotomy Right 02/10/2022    Dr Lopez       Current Outpatient Medications   Medication Sig    allopurinoL (ZYLOPRIM) 300 MG tablet TAKE 1 TABLET BY MOUTH EVERY DAY    ascorbic acid, vitamin C, (VITAMIN C) 1000 MG tablet Take 2,000 mg by mouth once  daily.    atorvastatin (LIPITOR) 40 MG tablet TAKE 1 TABLET(40 MG) BY MOUTH EVERY NIGHT    baclofen (LIORESAL) 10 MG tablet Take 1 tablet (10 mg total) by mouth nightly as needed (back pain.).    benzonatate (TESSALON) 200 MG capsule Take 1 capsule (200 mg total) by mouth 3 (three) times daily as needed for Cough.    carvediloL (COREG) 12.5 MG tablet Take 1 tablet (12.5 mg total) by mouth 2 (two) times daily.    docosahexaenoic acid/epa (FISH OIL ORAL) Take 1,000 mg by mouth every evening.    ENTYVIO 300 mg SolR injection Ulcerative colitis, once every 8 weeks    fluticasone propionate (FLONASE) 50 mcg/actuation nasal spray 1 spray (50 mcg total) by Each Nostril route once daily.    glimepiride (AMARYL) 4 MG tablet Take 1 tablet (4 mg total) by mouth before breakfast.    hydroCHLOROthiazide (HYDRODIURIL) 25 MG tablet Take 1 tablet (25 mg total) by mouth every morning.    ibuprofen (ADVIL,MOTRIN) 800 MG tablet Take 1 tablet (800 mg total) by mouth every 12 (twelve) hours as needed for Pain (for back pain.  Take with food.).    Lactobacillus rhamnosus GG (CULTURELLE) 10 billion cell capsule Take 1 capsule by mouth once daily.    losartan (COZAAR) 100 MG tablet Take 1 tablet (100 mg total) by mouth once daily.    psyllium husk (METAMUCIL ORAL) Take by mouth once daily.    TRULICITY 1.5 mg/0.5 mL pen injector INJECT 1.5MG INTO THE SKIN EVERY 7 DAYS    ubidecarenone (CO Q-10 ORAL) Take 400 mg by mouth once daily.    vitamin D (VITAMIN D3) 1000 units Tab Take 1,000 Units by mouth once daily.     No current facility-administered medications for this visit.       Review of patient's allergies indicates:   Allergen Reactions    Clindamycin Other (See Comments)     Dizziness      Hydromorphone      Other reaction(s): nausea    Rosuvastatin      Other reaction(s): insomnia, insomniaPT TOOK LIPITOR IN PAST AND STATES CAN TAKE.    Uloric [febuxostat]      Heart attack like symptoms       Family History   Problem Relation Name  Age of Onset    Cataracts Mother Josy Jyothi     Hyperlipidemia Mother Josy Jyothi     Macular degeneration Mother Josy Roe     Arthritis Mother Josy Jyothi     Diabetes Mother Josy Jyothi     Diabetes Father Gerson Roe     Cancer Father Gerson Roe     COPD Father Gerson Roe     Cataracts Sister      Macular degeneration Sister      Cataracts Brother      Cataracts Paternal Grandmother      Glaucoma Paternal Grandmother      Vision loss Sister Ann-Marie Gambleyoung     Amblyopia Neg Hx      Blindness Neg Hx      Hypertension Neg Hx      Retinal detachment Neg Hx      Strabismus Neg Hx      Stroke Neg Hx      Thyroid disease Neg Hx         Social History     Socioeconomic History    Marital status:    Tobacco Use    Smoking status: Former     Current packs/day: 0.00     Types: Cigarettes     Quit date:      Years since quittin.5    Smokeless tobacco: Former   Substance and Sexual Activity    Alcohol use: Not Currently     Comment: rare occasions    Drug use: Never    Sexual activity: Yes     Partners: Female     Birth control/protection: See Surgical Hx       Physical exam:  There were no vitals filed for this visit.  There is no height or weight on file to calculate BMI.  General: In no apparent distress; large body habitus  HEENT: normocephalic; atraumatic.  Cardiovascular: regular rate.  Respiratory: no increased work of breathing.  Musculoskeletal:   Gait: mild antalgic  Inspection:   Patient with significant bony prominence along the posterior heel at the Achilles insertion bilaterally.  No skin tenting or breakdown is noted.  Exquisite tenderness on deep palpation at the area of concern.  Also with moderate tenderness over the plantar heel at the medial calcaneal tuberosity.  Little to no tenderness over the Achilles midsubstance.  No palpable defect.  Good tension with Smith testing.    Moderate pain with extremes of passive ankle dorsiflexion.  Silfverskiold: Positive  Alignment:  Knee:  neutral               Ankle: neutral              Hindfoot: neutral              Forefoot: neutral   Strength:              Dorsiflexion 5/5  Plantar flexion 5/5  Inversion 5/5  Eversion 5/5  Sensation:              Some decreased sensation on monofilament testing but only on the left plantar forefoot, otherwise good sensation on monofilament testing.  ROM:              Ankle: near full with pain at extremes              Subtalar: near full with pain at extremes  Pulses: Palpable pedal pulse                   Imaging Studies/Outside documentation:  I have ordered/reviewed/interpreted the following images/outside documentation:  1. Weight-bearing 3-views of Bilateral foot and ankle:   On my independent review, no acute bony abnormality noted.  Patient with large enthesophyte at the Achilles insertion bilaterally with adjacent Gillian deformity.  Os trigonum bilaterally.  Large osteophyte along the left dorsal navicular.  Right worse than left hallux rigidus.  Ankle mortise remains congruent.        Assessment:  Carroll Roe is a 59 y.o. male with Bilateral gastroc contracture; Left>Right AICT.     Plan:   Clinical and radiographic findings were discussed at length with the patient and his wife who accompanies him today.    Patient to be placed into a tall cam boot.  This is to be worn at all times while weight-bearing.  Okay to remove for sleep and hygiene.    As noted in HPI, patient already on high dose oral ibuprofen.  I did recommend he begin to taper off of this given concern for GI ulceration, kidney dysfunction, etc.   We will plan to initiate physical therapy per protocol in 10-14 days.    Recommend patient remaining off work in patient and family voiced understanding.  All questions were answered.  He will follow up on an as-needed basis.    We performed a custom orthotic/brace fitting, adjusting and training with the patient. The patient demonstrated understanding and proper care. This was performed  for 15 minutes.    This note was created using voice recognition software and may contain grammatical errors.

## 2024-08-13 ENCOUNTER — CLINICAL SUPPORT (OUTPATIENT)
Dept: REHABILITATION | Facility: HOSPITAL | Age: 59
End: 2024-08-13
Attending: ORTHOPAEDIC SURGERY
Payer: COMMERCIAL

## 2024-08-13 DIAGNOSIS — M76.62 INSERTIONAL TENDINOPATHY OF LEFT ACHILLES TENDON: ICD-10-CM

## 2024-08-13 DIAGNOSIS — M25.672 DECREASED RANGE OF MOTION OF BOTH ANKLES: ICD-10-CM

## 2024-08-13 DIAGNOSIS — M62.89 TIGHTNESS OF BOTH GASTROCNEMIUS MUSCLES: ICD-10-CM

## 2024-08-13 DIAGNOSIS — R29.898 WEAKNESS OF BOTH LOWER EXTREMITIES: Primary | ICD-10-CM

## 2024-08-13 DIAGNOSIS — M25.671 DECREASED RANGE OF MOTION OF BOTH ANKLES: ICD-10-CM

## 2024-08-13 DIAGNOSIS — M76.61 INSERTIONAL TENDINOPATHY OF RIGHT ACHILLES TENDON: ICD-10-CM

## 2024-08-13 PROCEDURE — 97140 MANUAL THERAPY 1/> REGIONS: CPT | Mod: PO

## 2024-08-13 PROCEDURE — 97161 PT EVAL LOW COMPLEX 20 MIN: CPT | Mod: PO

## 2024-08-13 NOTE — PLAN OF CARE
"[facility]   Physical Therapy Initial Evaluation      Name: Carroll Roe  Clinic Number: [id number]    Therapy Diagnosis:   Encounter Diagnoses   Name Primary?    Tightness of both gastrocnemius muscles     Insertional tendinopathy of left Achilles tendon     Insertional tendinopathy of right Achilles tendon     Weakness of both lower extremities Yes    Decreased range of motion of both ankles         Physician: Diomedes Leigh MD    Physician Orders: PT Eval and Treat   Medical Diagnosis from Referral:   M62.89 (ICD-10-CM) - Tightness of both gastrocnemius muscles   M76.62 (ICD-10-CM) - Insertional tendinopathy of left Achilles tendon   M76.61 (ICD-10-CM) - Insertional tendinopathy of right Achilles tendon     Evaluation Date: 8/13/2024  Authorization Period Expiration: 12/31/2024  Plan of Care Expiration: 10/8/2024  Progress Note Due: 9/13/2024  Date of Surgery: NA  Visit # / Visits authorized: 1/ 1   FOTO: 1/ 3    Precautions: Standard     Time In: 0705  Time Out: 0750  Total Billable Time: 45 minutes    Subjective     Date of onset: 6-8 months ago    History of current condition - Carroll reports: Having "a terrible bone spur" in his L foot with pain for the past 6-8 months. He states the pain is present "anytime I get on my feet" and causes difficulty with walking, standing, work activities at [employer]. He denies any specific MOIs.    Falls: None    Imaging: See EPIC:     Prior Therapy: None  Social History: Lives with wife   Occupation: works at [employer]  Prior Level of Function: ind  Current Level of Function: ind c pain    Pain:  Current 8/10, worst 10/10, best 0/10   Location: bilateral achilles   Description: Aching  Aggravating Factors: Standing and Walking  Easing Factors: rest    Patients goals: improve function     Medical History:   Past Medical History:   Diagnosis Date    Arthritis     Controlled type 2 diabetes mellitus without complication, without " long-term current use of insulin 04/22/2021    General anesthetics causing adverse effect in therapeutic use     Gout     Hepatomegaly     Hyperlipidemia     Hypertension     Retinal tear, left     Sleep apnea     Snoring     Ulcerative colitis        Surgical History:   Carroll Roe  has a past surgical history that includes Wrist surgery; Shoulder surgery; Eye surgery; Vitrectomy by pars plana approach (Right, 04/24/2019); Vitrectomy by pars plana approach (Right, 06/12/2019); Removal of epiretinal membrane (06/12/2019); Endolaser photocoagulation (06/12/2019); Air fluid exchange of eye (06/12/2019); Retinal detachment surgery; Cataract extraction w/  intraocular lens implant (Right, 12/12/2019); Phacoemulsification of cataract (Right, 12/12/2019); Colonoscopy (10/15/2020); Colonoscopy (08/19/2021); Yag Capsulotomy (Right, 02/10/2022); Colonoscopy (04/07/2022); Fracture surgery (11/2001); and Transrectal biopsy of prostate with ultrasound guidance (N/A, 4/5/2024).    Medications:   Carroll has a current medication list which includes the following prescription(s): allopurinol, ascorbic acid (vitamin c), atorvastatin, baclofen, benzonatate, carvedilol, docosahexaenoic acid/epa, entyvio, fluticasone propionate, glimepiride, hydrochlorothiazide, ibuprofen, lactobacillus rhamnosus gg, losartan, psyllium husk, trulicity, ubidecarenone, and vitamin d.    Allergies:   Review of patient's allergies indicates:   Allergen Reactions    Clindamycin Other (See Comments)     Dizziness      Hydromorphone      Other reaction(s): nausea    Rosuvastatin      Other reaction(s): insomnia, insomniaPT TOOK LIPITOR IN PAST AND STATES CAN TAKE.    Uloric [febuxostat]      Heart attack like symptoms        Objective      Posture: FHP and increased thoracic kyphosis    Proximal/distal screen:     Active Range of Motion(*= pain):   Ankle Right Left   DF (knee extended) 45 neutral   Plantarflexion 55 50   Inversion 35 30   Eversion  10 10      Strength (*=pain):  Ankle Right Left   Dorsiflexion 5/5 5/5   Plantarflexion 5/5 5/5   Inversion 4+/5 4+/5   Eversion 4+/5 4+/5   Hip Abduction 4/5 4/5     Special Tests:  Smith Test:     Joint Mobility: hypomobile into B ankle DF, limited by soft tissue end feel    Palpation: TTP to B medial gastroc/soleus    Sensation: SILT    Flexibility: Decreased in B gastroc/soleus        Intake Outcome Measure for FOTO ankle Survey    Therapist reviewed FOTO scores for Carroll Roe on 8/13/2024.   FOTO report - see Media section or FOTO account episode details.    Intake Score: 45% limit         Treatment     Total Treatment time (time-based codes) separate from Evaluation: 15 minutes     Carroll received the treatments listed below:        manual therapy techniques: Myofacial release and Soft tissue Mobilization were applied to the: B gastroc soleus for 15 minutes, including:  STM to B gastroc/soleus    Discussed the purpose, mechanism, and indications for dry needling with Carroll . Patient was cleared of all precautions and contraindications and pt signed written consent and gave verbal consent to dry needling Rx today.   Palpation used to determine dry needling sites. Increased tone noted at B gastroc/soleus. Pt rec'd dry needling in supine position to B gastroc/soleus with .40 mm needles.  Pt tolerated treatment well and was not in any distress at the completion of treatment.       Education on HEP to include: slant board calf stretch several times daily c 30 sec hold          Patient Education and Home Exercises     Education provided:   - education on HEP and POC    Written Home Exercises Provided: Yes. Exercises were reviewed and Carroll was able to demonstrate them prior to the end of the session.  Carroll demonstrated good  understanding of the education provided. See EMR under Patient Instructions for exercises provided during therapy sessions.    Assessment     Carroll is a 59 y.o. male  referred to outpatient Physical Therapy with a medical diagnosis of   M62.89 (ICD-10-CM) - Tightness of both gastrocnemius muscles   M76.62 (ICD-10-CM) - Insertional tendinopathy of left Achilles tendon   M76.61 (ICD-10-CM) - Insertional tendinopathy of right Achilles tendon   Patient presents with increased pain and decreased range of motion, strength, and flexibility. These deficits limit the patient from performing everyday activities to include walking, standing, bending, jumping, jogging, and navigating stairs without pain or limitations. Pt with s/s consistent with myofacial tension with hyperactive trigger points at this time. Pt with good overall strength in B foot/ankle groups, but DF range of motion limited by soft tissue stretch end feels. Pt with notable tenderness to palpation of B gastroc/soleus groups, which were targeted with trigger point dry needling with good overall tolerance, local twitch responses achieved, and relief from discomfort. Pt was educated on stretching HEP and demonstrated good understanding. Due to these deficits, pt will benefit from skilled PT services to improve mobility, control pain, and restore overall function.        Patient prognosis is Good.   Patient will benefit from skilled outpatient Physical Therapy to address the deficits stated above and in the chart below, provide patient /family education, and to maximize patientt's level of independence.     Plan of care discussed with patient: Yes  Patient's spiritual, cultural and educational needs considered and patient is agreeable to the plan of care and goals as stated below:     Anticipated Barriers for therapy: pmhx    Medical Necessity is demonstrated by the following  History  Co-morbidities and personal factors that may impact the plan of care [] LOW: no personal factors / co-morbidities  [x] MODERATE: 1-2 personal factors / co-morbidities  [] HIGH: 3+ personal factors / co-morbidities    Moderate / High Support  Documentation:   Co-morbidities affecting plan of care: pmhx    Personal Factors:   no deficits     Examination  Body Structures and Functions, activity limitations and participation restrictions that may impact the plan of care [x] LOW: addressing 1-2 elements  [] MODERATE: 3+ elements  [] HIGH: 4+ elements (please support below)    Moderate / High Support Documentation:      Clinical Presentation [x] LOW: stable  [] MODERATE: Evolving  [] HIGH: Unstable     Decision Making/ Complexity Score: low       Goals:  Short Term Goals: 4 weeks   Pt to report worst pain 5/10  Pt to improve BLE strength by 1/2 grade  Pt to improve B ankle DF range of motion by 3 deg  Pt to improve FOTO by 10%    Long Term Goals: 8 weeks   Pt to report worst pain 2/10  Pt to improve BLE strength by 1 grade  Pt to improve B ankle DF range of motion by 5 deg  Pt to improve FOTO by 20%  Plan     Plan of care Certification: 8/13/2024 to 10/8/2024.    Outpatient Physical Therapy 2 times weekly for 8 weeks to include the following interventions: Gait Training, Manual Therapy, Moist Heat/ Ice, Neuromuscular Re-ed, Patient Education, Self Care, Therapeutic Activities, and Therapeutic Exercise.     Vinny Cannon, PT        Physician's Signature: _________________________________________ Date: ________________

## 2024-08-15 ENCOUNTER — CLINICAL SUPPORT (OUTPATIENT)
Dept: REHABILITATION | Facility: HOSPITAL | Age: 59
End: 2024-08-15
Payer: COMMERCIAL

## 2024-08-15 DIAGNOSIS — M25.671 DECREASED RANGE OF MOTION OF BOTH ANKLES: ICD-10-CM

## 2024-08-15 DIAGNOSIS — R29.898 WEAKNESS OF BOTH LOWER EXTREMITIES: Primary | ICD-10-CM

## 2024-08-15 DIAGNOSIS — M25.672 DECREASED RANGE OF MOTION OF BOTH ANKLES: ICD-10-CM

## 2024-08-15 PROCEDURE — 97110 THERAPEUTIC EXERCISES: CPT | Mod: PO

## 2024-08-15 PROCEDURE — 97140 MANUAL THERAPY 1/> REGIONS: CPT | Mod: PO

## 2024-08-15 PROCEDURE — 97530 THERAPEUTIC ACTIVITIES: CPT | Mod: PO

## 2024-08-15 PROCEDURE — 97112 NEUROMUSCULAR REEDUCATION: CPT | Mod: PO

## 2024-08-15 NOTE — PROGRESS NOTES
"[facility]   Physical Therapy Treatment Note     Name: Carroll Roe  Clinic Number: [id number]    Therapy Diagnosis:   Encounter Diagnoses   Name Primary?    Weakness of both lower extremities Yes    Decreased range of motion of both ankles      Physician: Diomedes Leigh MD    Visit Date: 8/15/2024    Physician Orders: PT Eval and Treat   Medical Diagnosis from Referral:   M62.89 (ICD-10-CM) - Tightness of both gastrocnemius muscles   M76.62 (ICD-10-CM) - Insertional tendinopathy of left Achilles tendon   M76.61 (ICD-10-CM) - Insertional tendinopathy of right Achilles tendon      Evaluation Date: 8/13/2024  Authorization Period Expiration: 12/31/2024  Plan of Care Expiration: 10/8/2024  Progress Note Due: 9/13/2024  Date of Surgery: NA  Visit # / Visits authorized: 1/ 30  FOTO: 1/ 3  PTA Visit #: 0/5     Time In: 0700  Time Out: 0755  Total Billable Time: 55 minutes    SUBJECTIVE     Pt reports: feeling good relief following dry needling last session that lasted for a few hours at work.  He was compliant with home exercise program.  Response to previous treatment: ind  Functional change: ind    Pain: 2/10  Location: bilateral calf     OBJECTIVE     Active Range of Motion(*= pain):   Ankle Right Left   DF (knee extended) 45 neutral   Plantarflexion 55 50   Inversion 35 30   Eversion 10 10      Strength (*=pain):  Ankle Right Left   Dorsiflexion 5/5 5/5   Plantarflexion 5/5 5/5   Inversion 4+/5 4+/5   Eversion 4+/5 4+/5   Hip Abduction 4/5 4/5        Treatment     Carroll received the treatments listed below:      therapeutic exercises to develop endurance, ROM, flexibility, and posture for 10 minutes including:  Rec bike x7 min for endurance/range of motion  Slant board calf stretch 3x30" DL and SL    manual therapy techniques: Myofacial release and Soft tissue Mobilization were applied to the: B gastroc/soleus for 15 minutes, including:  STM to B gastroc/soleus     Discussed " the purpose, mechanism, and indications for dry needling with Carroll . Patient was cleared of all precautions and contraindications and pt signed written consent and gave verbal consent to dry needling Rx today.   Palpation used to determine dry needling sites. Increased tone noted at B gastroc/soleus. Pt rec'd dry needling in supine position to B gastroc/soleus with .40 mm needles.  Pt tolerated treatment well and was not in any distress at the completion of treatment.     neuromuscular re-education activities to improve: Coordination, Kinesthetic, Sense, Proprioception, and Posture for 15 minutes. The following activities were included:  PRECOR Leg Press c eccentric focus 3x10  PRECOR calf raise c eccentric focus 3x10 DL    therapeutic activities to improve functional performance for 15  minutes, including:  Education and fitting for bilateral heel lift  Single leg step up c march to 12in box 2x5 BLE         Patient Education and Home Exercises     Home Exercises Provided and Patient Education Provided     Education provided:   - on heel lift, HEP, and POC    Written Home Exercises Provided: yes. Exercises were reviewed and Carroll was able to demonstrate them prior to the end of the session.  Carroll demonstrated good  understanding of the education provided. See EMR under Patient Instructions for exercises provided during therapy sessions    ASSESSMENT     First time follow-up performed today. Pt with notable relief following TDN last session, so this was incorporated again today with good overall tolerance and local twitch responses achieved. Pt was educated on usage of heel lift at work to encourage off loading and demonstrated good overall understanding. Patient will continue to benefit from loading and progressions to tolerance going forward to improve overall function.    Carroll Is progressing well towards his goals.   Pt prognosis is Fair.     Pt will continue to benefit from skilled outpatient physical  therapy to address the deficits listed in the problem list box on initial evaluation, provide pt/family education and to maximize pt's level of independence in the home and community environment.     Pt's spiritual, cultural and educational needs considered and pt agreeable to plan of care and goals.     Anticipated barriers to physical therapy: pmhx    Goals:   Short Term Goals: 4 weeks   Pt to report worst pain 5/10  Pt to improve BLE strength by 1/2 grade  Pt to improve B ankle DF range of motion by 3 deg  Pt to improve FOTO by 10%     Long Term Goals: 8 weeks   Pt to report worst pain 2/10  Pt to improve BLE strength by 1 grade  Pt to improve B ankle DF range of motion by 5 deg  Pt to improve FOTO by 20%    PLAN     Plan of care Certification: 8/13/2024 to 10/8/2024.     Outpatient Physical Therapy 2 times weekly for 8 weeks to include the following interventions: Gait Training, Manual Therapy, Moist Heat/ Ice, Neuromuscular Re-ed, Patient Education, Self Care, Therapeutic Activities, and Therapeutic Exercise.     Vinny Cannon, PT

## 2024-08-20 ENCOUNTER — CLINICAL SUPPORT (OUTPATIENT)
Dept: REHABILITATION | Facility: HOSPITAL | Age: 59
End: 2024-08-20
Payer: COMMERCIAL

## 2024-08-20 DIAGNOSIS — R29.898 WEAKNESS OF BOTH LOWER EXTREMITIES: Primary | ICD-10-CM

## 2024-08-20 DIAGNOSIS — M25.671 DECREASED RANGE OF MOTION OF BOTH ANKLES: ICD-10-CM

## 2024-08-20 DIAGNOSIS — M25.672 DECREASED RANGE OF MOTION OF BOTH ANKLES: ICD-10-CM

## 2024-08-20 PROCEDURE — 97110 THERAPEUTIC EXERCISES: CPT | Mod: PO

## 2024-08-20 PROCEDURE — 97140 MANUAL THERAPY 1/> REGIONS: CPT | Mod: PO

## 2024-08-20 PROCEDURE — 97112 NEUROMUSCULAR REEDUCATION: CPT | Mod: PO

## 2024-08-20 PROCEDURE — 97530 THERAPEUTIC ACTIVITIES: CPT | Mod: PO

## 2024-08-20 NOTE — PROGRESS NOTES
"[facility]   Physical Therapy Treatment Note     Name: Carroll Roe  Clinic Number: [id number]    Therapy Diagnosis:   Encounter Diagnoses   Name Primary?    Weakness of both lower extremities Yes    Decreased range of motion of both ankles      Physician: Diomedes Leigh MD    Visit Date: 8/20/2024    Physician Orders: PT Eval and Treat   Medical Diagnosis from Referral:   M62.89 (ICD-10-CM) - Tightness of both gastrocnemius muscles   M76.62 (ICD-10-CM) - Insertional tendinopathy of left Achilles tendon   M76.61 (ICD-10-CM) - Insertional tendinopathy of right Achilles tendon      Evaluation Date: 8/13/2024  Authorization Period Expiration: 12/31/2024  Plan of Care Expiration: 10/8/2024  Progress Note Due: 9/13/2024  Date of Surgery: NA  Visit # / Visits authorized: 2/ 30  FOTO: 1/ 3  PTA Visit #: 0/5     Time In: 0700  Time Out: 0755  Total Billable Time: 55 minutes    SUBJECTIVE     Pt reports: feeling increased soreness after dry needling last session  He was compliant with home exercise program.  Response to previous treatment: ind  Functional change: ind    Pain: 2/10  Location: bilateral calf     OBJECTIVE     Active Range of Motion(*= pain):   Ankle Right Left   DF (knee extended) 45 neutral   Plantarflexion 55 50   Inversion 35 30   Eversion 10 10      Strength (*=pain):  Ankle Right Left   Dorsiflexion 5/5 5/5   Plantarflexion 5/5 5/5   Inversion 4+/5 4+/5   Eversion 4+/5 4+/5   Hip Abduction 4/5 4/5        Treatment     Carroll received the treatments listed below:      therapeutic exercises to develop endurance, ROM, flexibility, and posture for 15 minutes including:  Rec bike x7 min for endurance/range of motion  Slant board calf stretch 3x30" DL and SL  Banded PF/DF x30 ea BLE    manual therapy techniques: Myofacial release and Soft tissue Mobilization were applied to the: B gastroc/soleus for 10 minutes, including:  STM to B gastroc/soleus x10 min    "     neuromuscular re-education activities to improve: Coordination, Kinesthetic, Sense, Proprioception, and Posture for 15 minutes. The following activities were included:  PRECOR Leg Press c eccentric focus 3x10 60#  PRECOR calf raise c eccentric focus 3x10 DL    therapeutic activities to improve functional performance for 15  minutes, including:  Education and fitting for bilateral heel lift  Single leg step up c march to 12in box 2x5 BLE   Mini squatting from behind mat 3x10        Patient Education and Home Exercises     Home Exercises Provided and Patient Education Provided     Education provided:   - on heel lift, HEP, and POC    Written Home Exercises Provided: yes. Exercises were reviewed and Carroll was able to demonstrate them prior to the end of the session.  Carroll demonstrated good  understanding of the education provided. See EMR under Patient Instructions for exercises provided during therapy sessions    ASSESSMENT     Due to increased muscular soreness following dry needling last session. This was held today to keep needling frequency to 1x per week. Pt with good tolerance to functional progressions to include body weight squatting and progressive flexibility interventions. Patient will continue to benefit from loading and progressions to tolerance going forward to improve overall function.    Carroll Is progressing well towards his goals.   Pt prognosis is Fair.     Pt will continue to benefit from skilled outpatient physical therapy to address the deficits listed in the problem list box on initial evaluation, provide pt/family education and to maximize pt's level of independence in the home and community environment.     Pt's spiritual, cultural and educational needs considered and pt agreeable to plan of care and goals.     Anticipated barriers to physical therapy: pmhx    Goals:   Short Term Goals: 4 weeks   Pt to report worst pain 5/10  Pt to improve BLE strength by 1/2 grade  Pt to improve B  ankle DF range of motion by 3 deg  Pt to improve FOTO by 10%     Long Term Goals: 8 weeks   Pt to report worst pain 2/10  Pt to improve BLE strength by 1 grade  Pt to improve B ankle DF range of motion by 5 deg  Pt to improve FOTO by 20%    PLAN     Plan of care Certification: 8/13/2024 to 10/8/2024.     Outpatient Physical Therapy 2 times weekly for 8 weeks to include the following interventions: Gait Training, Manual Therapy, Moist Heat/ Ice, Neuromuscular Re-ed, Patient Education, Self Care, Therapeutic Activities, and Therapeutic Exercise.     Vinny Cannon, PT

## 2024-08-23 ENCOUNTER — CLINICAL SUPPORT (OUTPATIENT)
Dept: REHABILITATION | Facility: HOSPITAL | Age: 59
End: 2024-08-23
Payer: COMMERCIAL

## 2024-08-23 DIAGNOSIS — R29.898 WEAKNESS OF BOTH LOWER EXTREMITIES: Primary | ICD-10-CM

## 2024-08-23 PROCEDURE — 97112 NEUROMUSCULAR REEDUCATION: CPT | Mod: PO

## 2024-08-23 PROCEDURE — 97140 MANUAL THERAPY 1/> REGIONS: CPT | Mod: PO

## 2024-08-23 PROCEDURE — 97530 THERAPEUTIC ACTIVITIES: CPT | Mod: PO

## 2024-08-23 NOTE — PROGRESS NOTES
"[facility]   Physical Therapy Treatment Note     Name: Carroll Roe  Clinic Number: [id number]    Therapy Diagnosis:   Encounter Diagnosis   Name Primary?    Weakness of both lower extremities Yes     Physician: Diomedes Leigh MD    Visit Date: 8/23/2024    Physician Orders: PT Eval and Treat   Medical Diagnosis from Referral:   M62.89 (ICD-10-CM) - Tightness of both gastrocnemius muscles   M76.62 (ICD-10-CM) - Insertional tendinopathy of left Achilles tendon   M76.61 (ICD-10-CM) - Insertional tendinopathy of right Achilles tendon      Evaluation Date: 8/13/2024  Authorization Period Expiration: 12/31/2024  Plan of Care Expiration: 10/8/2024  Progress Note Due: 9/13/2024  Date of Surgery: NA  Visit # / Visits authorized: 3/ 30  FOTO: 1/ 3  PTA Visit #: 0/5     Time In: 0700  Time Out: 0745  Total Billable Time: 45 minutes 1:1 c PT/tech    SUBJECTIVE     Pt reports: stepping into a fire ant pile and states he feels very under the weather as a result  He was compliant with home exercise program.  Response to previous treatment: ind  Functional change: ind    Pain: 2/10  Location: bilateral calf     OBJECTIVE     Active Range of Motion(*= pain):   Ankle Right Left   DF (knee extended) 45 neutral   Plantarflexion 55 50   Inversion 35 30   Eversion 10 10      Strength (*=pain):  Ankle Right Left   Dorsiflexion 5/5 5/5   Plantarflexion 5/5 5/5   Inversion 4+/5 4+/5   Eversion 4+/5 4+/5   Hip Abduction 4/5 4/5        Treatment     Carroll received the treatments listed below:      therapeutic exercises to develop endurance, ROM, flexibility, and posture for 0 minutes including:  Rec bike x7 min for endurance/range of motion  Slant board calf stretch 3x30" DL and SL  Banded PF/DF x30 ea BLE    manual therapy techniques: Myofacial release and Soft tissue Mobilization were applied to the: B gastroc/soleus for 15 minutes, including:  STM to B gastroc/soleus x10 min        neuromuscular " re-education activities to improve: Coordination, Kinesthetic, Sense, Proprioception, and Posture for 15 minutes. The following activities were included:  PRECOR Leg Press c eccentric focus 3x10 60#  PRECOR calf raise c eccentric focus 3x10 DL    therapeutic activities to improve functional performance for 15  minutes, including:  Education and fitting for bilateral heel lift  Single leg step up c march to 12in box 2x5 BLE   Mini squatting from behind mat 3x10        Patient Education and Home Exercises     Home Exercises Provided and Patient Education Provided     Education provided:   - on heel lift, HEP, and POC    Written Home Exercises Provided: yes. Exercises were reviewed and Carroll was able to demonstrate them prior to the end of the session.  Carroll demonstrated good  understanding of the education provided. See EMR under Patient Instructions for exercises provided during therapy sessions    ASSESSMENT     Session limited in time due to pt feeling under the weather. Re-incorporated TDN to B gastroc with local twitch responses achieved and good overall tolerance. Pt will benefit from further load progression next session to improve overall function.    Carroll Is progressing well towards his goals.   Pt prognosis is Fair.     Pt will continue to benefit from skilled outpatient physical therapy to address the deficits listed in the problem list box on initial evaluation, provide pt/family education and to maximize pt's level of independence in the home and community environment.     Pt's spiritual, cultural and educational needs considered and pt agreeable to plan of care and goals.     Anticipated barriers to physical therapy: pmhx    Goals:   Short Term Goals: 4 weeks   Pt to report worst pain 5/10  Pt to improve BLE strength by 1/2 grade  Pt to improve B ankle DF range of motion by 3 deg  Pt to improve FOTO by 10%     Long Term Goals: 8 weeks   Pt to report worst pain 2/10  Pt to improve BLE strength by  1 grade  Pt to improve B ankle DF range of motion by 5 deg  Pt to improve FOTO by 20%    PLAN     Plan of care Certification: 8/13/2024 to 10/8/2024.     Outpatient Physical Therapy 2 times weekly for 8 weeks to include the following interventions: Gait Training, Manual Therapy, Moist Heat/ Ice, Neuromuscular Re-ed, Patient Education, Self Care, Therapeutic Activities, and Therapeutic Exercise.     Vinny Cannon, PT

## 2024-08-27 ENCOUNTER — CLINICAL SUPPORT (OUTPATIENT)
Dept: REHABILITATION | Facility: HOSPITAL | Age: 59
End: 2024-08-27
Payer: COMMERCIAL

## 2024-08-27 DIAGNOSIS — R29.898 WEAKNESS OF BOTH LOWER EXTREMITIES: Primary | ICD-10-CM

## 2024-08-27 PROCEDURE — 97110 THERAPEUTIC EXERCISES: CPT | Mod: PO

## 2024-08-27 PROCEDURE — 97140 MANUAL THERAPY 1/> REGIONS: CPT | Mod: PO

## 2024-08-27 PROCEDURE — 97112 NEUROMUSCULAR REEDUCATION: CPT | Mod: PO

## 2024-08-27 PROCEDURE — 97530 THERAPEUTIC ACTIVITIES: CPT | Mod: PO

## 2024-08-27 NOTE — PROGRESS NOTES
"[facility]   Physical Therapy Treatment Note     Name: Carroll Roe  Clinic Number: [id number]    Therapy Diagnosis:   Encounter Diagnosis   Name Primary?    Weakness of both lower extremities Yes     Physician: Diomedes Leigh MD    Visit Date: 8/27/2024    Physician Orders: PT Eval and Treat   Medical Diagnosis from Referral:   M62.89 (ICD-10-CM) - Tightness of both gastrocnemius muscles   M76.62 (ICD-10-CM) - Insertional tendinopathy of left Achilles tendon   M76.61 (ICD-10-CM) - Insertional tendinopathy of right Achilles tendon      Evaluation Date: 8/13/2024  Authorization Period Expiration: 12/31/2024  Plan of Care Expiration: 10/8/2024  Progress Note Due: 9/13/2024  Date of Surgery: NA  Visit # / Visits authorized: 4/ 30  FOTO: 1/ 3  PTA Visit #: 0/5     Time In: 0700  Time Out: 0753  Total Billable Time: 53 minutes 1:1 c PT/tech    SUBJECTIVE     Pt reports: feeling slightly better today but states his knees are hurting today.  He was compliant with home exercise program.  Response to previous treatment: ind  Functional change: ind    Pain: 2/10  Location: bilateral calf     OBJECTIVE     Active Range of Motion(*= pain):   Ankle Right Left   DF (knee extended) 45 neutral   Plantarflexion 55 50   Inversion 35 30   Eversion 10 10      Strength (*=pain):  Ankle Right Left   Dorsiflexion 5/5 5/5   Plantarflexion 5/5 5/5   Inversion 4+/5 4+/5   Eversion 4+/5 4+/5   Hip Abduction 4/5 4/5        Treatment     Carroll received the treatments listed below:      therapeutic exercises to develop endurance, ROM, flexibility, and posture for 10 minutes including:  Rec bike x7 min for endurance/range of motion  Slant board calf stretch 3x30" DL and SL    manual therapy techniques: Myofacial release and Soft tissue Mobilization were applied to the: B gastroc/soleus for 13 minutes, including:  STM to B gastroc/soleus x10 min      Discussed the purpose, mechanism, and indications for dry " needling with Carroll . Patient was cleared of all precautions and contraindications and pt signed written consent and gave verbal consent to dry needling Rx today.   Palpation used to determine dry needling sites. Increased tone noted at B gastroc/soleus. Pt rec'd dry needling in supine position to B gastroc/soleus with .40 mm needles.  Pt tolerated treatment well and was not in any distress at the completion of treatment.     neuromuscular re-education activities to improve: Coordination, Kinesthetic, Sense, Proprioception, and Posture for 15 minutes. The following activities were included:  PRECOR Leg Press c eccentric focus 3x10 70#  PRECOR calf raise c eccentric focus 3x10 DL 50#  Prone hamstring curl black TB x20 BLE    therapeutic activities to improve functional performance for 15  minutes, including:  Education and fitting for bilateral heel lift  Single leg step up c march to 12in box 2x5 BLE   Mini squatting from behind mat 3x10        Patient Education and Home Exercises     Home Exercises Provided and Patient Education Provided     Education provided:   - on heel lift, HEP, and POC    Written Home Exercises Provided: yes. Exercises were reviewed and Carroll was able to demonstrate them prior to the end of the session.  Carroll demonstrated good  understanding of the education provided. See EMR under Patient Instructions for exercises provided during therapy sessions    ASSESSMENT   Continued with trigger point dry needling to B gastroc groups followed by flexibility and compound loading with great tolerance. Due to c/o mild B posterior knee pain, resisted hamstring curls were incorporated to promote appropriate joint gliding with notable alleviation of pain on R side. Patient will continue to benefit from loading and progressions to tolerance going forward to improve overall function.    Carroll Is progressing well towards his goals.   Pt prognosis is Fair.     Pt will continue to benefit from skilled  outpatient physical therapy to address the deficits listed in the problem list box on initial evaluation, provide pt/family education and to maximize pt's level of independence in the home and community environment.     Pt's spiritual, cultural and educational needs considered and pt agreeable to plan of care and goals.     Anticipated barriers to physical therapy: pmhx    Goals:   Short Term Goals: 4 weeks   Pt to report worst pain 5/10  Pt to improve BLE strength by 1/2 grade  Pt to improve B ankle DF range of motion by 3 deg  Pt to improve FOTO by 10%     Long Term Goals: 8 weeks   Pt to report worst pain 2/10  Pt to improve BLE strength by 1 grade  Pt to improve B ankle DF range of motion by 5 deg  Pt to improve FOTO by 20%    PLAN     Plan of care Certification: 8/13/2024 to 10/8/2024.     Outpatient Physical Therapy 2 times weekly for 8 weeks to include the following interventions: Gait Training, Manual Therapy, Moist Heat/ Ice, Neuromuscular Re-ed, Patient Education, Self Care, Therapeutic Activities, and Therapeutic Exercise.     Vinny Cannon, PT

## 2024-08-29 ENCOUNTER — CLINICAL SUPPORT (OUTPATIENT)
Dept: REHABILITATION | Facility: HOSPITAL | Age: 59
End: 2024-08-29
Payer: COMMERCIAL

## 2024-08-29 DIAGNOSIS — R29.898 WEAKNESS OF BOTH LOWER EXTREMITIES: Primary | ICD-10-CM

## 2024-08-29 PROCEDURE — 97112 NEUROMUSCULAR REEDUCATION: CPT | Mod: PO

## 2024-08-29 NOTE — PROGRESS NOTES
"[facility]   Physical Therapy Treatment Note     Name: Carroll Roe  Clinic Number: [id number]    Therapy Diagnosis:   Encounter Diagnosis   Name Primary?    Weakness of both lower extremities Yes     Physician: Diomedes Leigh MD    Visit Date: 8/29/2024    Physician Orders: PT Eval and Treat   Medical Diagnosis from Referral:   M62.89 (ICD-10-CM) - Tightness of both gastrocnemius muscles   M76.62 (ICD-10-CM) - Insertional tendinopathy of left Achilles tendon   M76.61 (ICD-10-CM) - Insertional tendinopathy of right Achilles tendon      Evaluation Date: 8/13/2024  Authorization Period Expiration: 12/31/2024  Plan of Care Expiration: 10/8/2024  Progress Note Due: 9/13/2024  Date of Surgery: NA  Visit # / Visits authorized: 5/ 30  FOTO: 1/ 3  PTA Visit #: 0/5     Time In: 0700  Time Out: 0748  Total Billable Time: 48 minutes 1:1 c PT/tech    SUBJECTIVE     Pt reports: he has to leave early today to get to work today  He was compliant with home exercise program.  Response to previous treatment: ind  Functional change: ind    Pain: 2/10  Location: bilateral calf     OBJECTIVE     Active Range of Motion(*= pain):   Ankle Right Left   DF (knee extended) 45 neutral   Plantarflexion 55 50   Inversion 35 30   Eversion 10 10      Strength (*=pain):  Ankle Right Left   Dorsiflexion 5/5 5/5   Plantarflexion 5/5 5/5   Inversion 4+/5 4+/5   Eversion 4+/5 4+/5   Hip Abduction 4/5 4/5        Treatment     Carroll received the treatments listed below:      therapeutic exercises to develop endurance, ROM, flexibility, and posture for 0 minutes including:  Rec bike x7 min for endurance/range of motion  Slant board calf stretch 3x30" DL and SL    manual therapy techniques: Myofacial release and Soft tissue Mobilization were applied to the: B gastroc/soleus for 0 minutes, including:  STM to B gastroc/soleus x10 min      Discussed the purpose, mechanism, and indications for dry needling with Carroll " . Patient was cleared of all precautions and contraindications and pt signed written consent and gave verbal consent to dry needling Rx today.   Palpation used to determine dry needling sites. Increased tone noted at B gastroc/soleus. Pt rec'd dry needling in supine position to B gastroc/soleus with .40 mm needles.  Pt tolerated treatment well and was not in any distress at the completion of treatment.     neuromuscular re-education activities to improve: Coordination, Kinesthetic, Sense, Proprioception, and Posture for 48 minutes. The following activities were included:  PRECOR Leg Press c eccentric focus 3x10 70#  PRECOR calf raise c eccentric focus 3x10 DL 50#  Prone hamstring curl black TB x20 BLE  PRECOR seated leg curl isometric 3x10 c 3 sec hold  PRECOR knee extension 3x10 DL 30#    therapeutic activities to improve functional performance for 0  minutes, including:  Education and fitting for bilateral heel lift  Single leg step up c march to 12in box 2x5 BLE   Mini squatting from behind mat 3x10        Patient Education and Home Exercises     Home Exercises Provided and Patient Education Provided     Education provided:   - on heel lift, HEP, and POC    Written Home Exercises Provided: yes. Exercises were reviewed and Carroll was able to demonstrate them prior to the end of the session.  Carroll demonstrated good  understanding of the education provided. See EMR under Patient Instructions for exercises provided during therapy sessions    ASSESSMENT     Pt progressed to incorporate increased CAM machine loading to B quad and hamstring groups with great tolerance today. Huff to continued c/o mild post knee pain, isometric leg curls incorporated with good overall tolerance and mild reduction in sx. Patient will continue to benefit from loading and progressions to tolerance going forward to improve overall function.    Carroll Is progressing well towards his goals.   Pt prognosis is Fair.     Pt will continue to  benefit from skilled outpatient physical therapy to address the deficits listed in the problem list box on initial evaluation, provide pt/family education and to maximize pt's level of independence in the home and community environment.     Pt's spiritual, cultural and educational needs considered and pt agreeable to plan of care and goals.     Anticipated barriers to physical therapy: pmhx    Goals:   Short Term Goals: 4 weeks   Pt to report worst pain 5/10  Pt to improve BLE strength by 1/2 grade  Pt to improve B ankle DF range of motion by 3 deg  Pt to improve FOTO by 10%     Long Term Goals: 8 weeks   Pt to report worst pain 2/10  Pt to improve BLE strength by 1 grade  Pt to improve B ankle DF range of motion by 5 deg  Pt to improve FOTO by 20%    PLAN     Plan of care Certification: 8/13/2024 to 10/8/2024.     Outpatient Physical Therapy 2 times weekly for 8 weeks to include the following interventions: Gait Training, Manual Therapy, Moist Heat/ Ice, Neuromuscular Re-ed, Patient Education, Self Care, Therapeutic Activities, and Therapeutic Exercise.     Vinny Cannon, PT

## 2024-10-11 ENCOUNTER — LAB VISIT (OUTPATIENT)
Dept: LAB | Facility: HOSPITAL | Age: 59
End: 2024-10-11
Attending: STUDENT IN AN ORGANIZED HEALTH CARE EDUCATION/TRAINING PROGRAM
Payer: COMMERCIAL

## 2024-10-11 DIAGNOSIS — R97.20 ELEVATED PSA: ICD-10-CM

## 2024-10-11 LAB — COMPLEXED PSA SERPL-MCNC: 3.2 NG/ML (ref 0–4)

## 2024-10-11 PROCEDURE — 36415 COLL VENOUS BLD VENIPUNCTURE: CPT | Mod: PO | Performed by: STUDENT IN AN ORGANIZED HEALTH CARE EDUCATION/TRAINING PROGRAM

## 2024-10-11 PROCEDURE — 84153 ASSAY OF PSA TOTAL: CPT | Performed by: STUDENT IN AN ORGANIZED HEALTH CARE EDUCATION/TRAINING PROGRAM

## 2024-10-15 ENCOUNTER — OFFICE VISIT (OUTPATIENT)
Dept: UROLOGY | Facility: CLINIC | Age: 59
End: 2024-10-15
Payer: COMMERCIAL

## 2024-10-15 VITALS — WEIGHT: 315 LBS | HEIGHT: 69 IN | BODY MASS INDEX: 46.65 KG/M2

## 2024-10-15 DIAGNOSIS — R97.20 ELEVATED PSA: Primary | ICD-10-CM

## 2024-10-15 DIAGNOSIS — N40.0 BPH WITHOUT URINARY OBSTRUCTION: ICD-10-CM

## 2024-10-15 PROCEDURE — 3051F HG A1C>EQUAL 7.0%<8.0%: CPT | Mod: CPTII,S$GLB,, | Performed by: STUDENT IN AN ORGANIZED HEALTH CARE EDUCATION/TRAINING PROGRAM

## 2024-10-15 PROCEDURE — 99214 OFFICE O/P EST MOD 30 MIN: CPT | Mod: S$GLB,,, | Performed by: STUDENT IN AN ORGANIZED HEALTH CARE EDUCATION/TRAINING PROGRAM

## 2024-10-15 PROCEDURE — 1159F MED LIST DOCD IN RCRD: CPT | Mod: CPTII,S$GLB,, | Performed by: STUDENT IN AN ORGANIZED HEALTH CARE EDUCATION/TRAINING PROGRAM

## 2024-10-15 PROCEDURE — 3008F BODY MASS INDEX DOCD: CPT | Mod: CPTII,S$GLB,, | Performed by: STUDENT IN AN ORGANIZED HEALTH CARE EDUCATION/TRAINING PROGRAM

## 2024-10-15 PROCEDURE — 4010F ACE/ARB THERAPY RXD/TAKEN: CPT | Mod: CPTII,S$GLB,, | Performed by: STUDENT IN AN ORGANIZED HEALTH CARE EDUCATION/TRAINING PROGRAM

## 2024-10-15 PROCEDURE — 99999 PR PBB SHADOW E&M-EST. PATIENT-LVL III: CPT | Mod: PBBFAC,,, | Performed by: STUDENT IN AN ORGANIZED HEALTH CARE EDUCATION/TRAINING PROGRAM

## 2024-10-15 NOTE — PROGRESS NOTES
"[place] - Urology   Clinic Note    Subjective:     Chief Complaint: Follow-up      History of Present Illness:  Carroll Roe is a 59 y.o. male who presents to clinic for evaluation and management of an elevated PSA.     He has had an elevated PSA. An MRI prostate 10/23 showed only PI-RADS 2 lesion with no discrete nodularity. Volume was 106 cc. He has no known family history of prostate cancer.   PSAD - 0.041. ExoDx score 29.34, above cutoff. Underwent standard TRUS biopsy 04/05/2024 which was benign, minute focus of ASAP at the left apex.   No significant LUTS at baseline. Good stream. IPSS is 4/2. No ED    He has ulcerative colitis. No previous abdominal surgeries.      PSA   Latest Ref Rng 0.00 - 4.00 ng/mL   4/22/2021 3.1    7/27/2022 5.5 (H)    9/21/2023 8.5 (H)    11/3/2023 5.0 (H)    3/21/2024 4.4 (H) 19.55% free      8/16/2024 4.1 (H)    10/11/2024 3.2      Past medical, family, surgical and social history reviewed as documented in chart with pertinent positive medical, family, surgical and social history detailed in HPI.    A review systems was conducted with pertinent positive and negative findings documented in HPI.    Objective:     Estimated body mass index is 46.62 kg/m² as calculated from the following:    Height as of this encounter: 5' 9" (1.753 m).    Weight as of this encounter: 143.2 kg (315 lb 11.2 oz).    Vital Signs (Most Recent)       Physical Exam  Constitutional:       General: He is not in acute distress.     Appearance: He is not ill-appearing or toxic-appearing.   Pulmonary:      Effort: Pulmonary effort is normal. No accessory muscle usage or respiratory distress.   Neurological:      Mental Status: He is alert.       Labs reviewed below:  Lab Results   Component Value Date    BUN 14 08/16/2024    BUN 14 08/16/2024    CREATININE 0.63 08/16/2024    CREATININE 0.63 08/16/2024    WBC 5.41 08/16/2024    HGB 13.8 (L) 08/16/2024    HCT 42.5 08/16/2024     08/16/2024    AST 39 " 08/16/2024    AST 39 08/16/2024    ALT 55 (H) 08/16/2024    ALT 55 (H) 08/16/2024    ALKPHOS 67 08/16/2024    ALKPHOS 67 08/16/2024    ALBUMIN 4.3 08/16/2024    ALBUMIN 4.3 08/16/2024    HGBA1C 7.0 (H) 08/16/2024        PSA trend reviewed below:  Lab Results   Component Value Date    PSA 4.1 (H) 08/16/2024    PSA 5.0 (H) 11/03/2023    PSA 8.5 (H) 09/21/2023    PSA 5.5 (H) 07/27/2022    PSA 3.1 04/22/2021    PSA 1.8 08/28/2017    PSADIAG 3.2 10/11/2024    PSAFREE 0.86 03/21/2024    PSAFREEPCT 19.55 03/21/2024      Urine dipstick today was negative for blood, leukocytes, and nitrites.     Assessment:     1. Elevated PSA    2. BPH without urinary obstruction      Plan:     PSA values were discussed. PSA can be elevated in both benign and malignant conditions. We discussed the most common differential diagnosis of an elevated PSA including BPH, prostatitis (chronic and acute), and prostate cancer. The issues of sensitivity, specificity and the predictive values of PSA were discussed in detail. We discussed the benefits and limitations of PSA testing/screening, and the utility of PSA screening.    Recommend continued PSA screening yearly  Monitor the BPH    PSA in 1 year    Mateus Brown MD

## (undated) DEVICE — SPONGE WEC CEL SPEARS

## (undated) DEVICE — SOL BETADINE 5%

## (undated) DEVICE — TOWEL OR NONABSORB ADH 17X26

## (undated) DEVICE — TIP I/A CURVED SINGLE USE

## (undated) DEVICE — SEE MEDLINE ITEM 152622

## (undated) DEVICE — GLOVE PROTEXIS HYDROGEL SZ8

## (undated) DEVICE — SEE MEDLINE ITEM 157128

## (undated) DEVICE — CORD BPLR SGL USE DISP STRL

## (undated) DEVICE — PACK OPHTHALMIC

## (undated) DEVICE — CANNULA OPTHALMIC SOF TIP 25G

## (undated) DEVICE — GOWN SURG 2XL DISP TIE BACK

## (undated) DEVICE — SYR 3CC LUER LOC

## (undated) DEVICE — SYR DISP LL 5CC

## (undated) DEVICE — SOL IRR BSS OPHTH 500ML STRL

## (undated) DEVICE — COVER OVERHEAD SURG LT BLUE

## (undated) DEVICE — DRESSING EYE OVAL LF

## (undated) DEVICE — SHIELD COLLAGEN 12HR CORNEAL

## (undated) DEVICE — SHIELD EYE METAL FOX 50/BX

## (undated) DEVICE — PACK EYE CUSTOM COVINGTON.

## (undated) DEVICE — SHEILD & GARTERS FOX METAL EYE

## (undated) DEVICE — PROBE ILLUM FLEX CURVE LASER